# Patient Record
Sex: FEMALE | Race: WHITE | NOT HISPANIC OR LATINO | Employment: FULL TIME | URBAN - METROPOLITAN AREA
[De-identification: names, ages, dates, MRNs, and addresses within clinical notes are randomized per-mention and may not be internally consistent; named-entity substitution may affect disease eponyms.]

---

## 2017-01-20 ENCOUNTER — OFFICE VISIT (OUTPATIENT)
Dept: NEUROSURGERY | Facility: CLINIC | Age: 37
End: 2017-01-20

## 2017-01-20 VITALS — DIASTOLIC BLOOD PRESSURE: 92 MMHG | SYSTOLIC BLOOD PRESSURE: 139 MMHG | HEART RATE: 92 BPM

## 2017-01-20 DIAGNOSIS — G93.5 CHIARI MALFORMATION TYPE I (HCC): Primary | ICD-10-CM

## 2017-01-20 DIAGNOSIS — Z98.890 HISTORY OF BRAIN SURGERY: ICD-10-CM

## 2017-01-20 PROCEDURE — 99024 POSTOP FOLLOW-UP VISIT: CPT | Performed by: NEUROLOGICAL SURGERY

## 2017-01-20 NOTE — PROGRESS NOTES
Subjective   Patient ID: Valentina Anderson is a 36 y.o. female is here today for follow-up. She had a C1-2 laminectomy for decompression of chiari malformation on 10/25/2016.     History of Present Illness    The following portions of the patient's history were reviewed and updated as appropriate: allergies, current medications, past family history, past medical history, past social history, past surgical history and problem list.    Review of Systems   Eyes: Negative for visual disturbance.   Respiratory: Negative for chest tightness and shortness of breath.    Neurological: Negative for dizziness and headaches.   All other systems reviewed and are negative.    It's been 3 months since her posterior fossa decompression and duraplasty for Chiari malformation.  Preoperatively she had headaches, dizziness, numbness and tingling and weakness in the arms and hands, and chronic fatigue.  She says all all of those things have improved.  I'm somewhat surprised about the fatigue but she says that she feels she has much more energy.  She went back to work and is tolerating it.  She had a few questions about her incision which I answered.  I reminded her to work on the chin tuck which help strengthen the muscles in the back of the neck.  We'll see her at the [facility] for a final time which is not months from now.        Objective   Physical Exam   Constitutional: She is oriented to person, place, and time. She appears well-developed and well-nourished.   HENT:   Head: Normocephalic and atraumatic.   Eyes: Conjunctivae and EOM are normal. Pupils are equal, round, and reactive to light.   Fundoscopic exam:       The right eye shows no papilledema. The right eye shows venous pulsations.        The left eye shows no papilledema. The left eye shows venous pulsations.   Neck: Carotid bruit is not present.   Neurological: She is oriented to person, place, and time. She has a normal Finger-Nose-Finger Test and a normal Heel to Shin  Test. Gait normal.   Reflex Scores:       Tricep reflexes are 2+ on the right side and 2+ on the left side.       Bicep reflexes are 2+ on the right side and 2+ on the left side.       Brachioradialis reflexes are 2+ on the right side and 2+ on the left side.       Patellar reflexes are 2+ on the right side and 2+ on the left side.       Achilles reflexes are 2+ on the right side and 2+ on the left side.  Psychiatric: Her speech is normal.     Neurologic Exam     Mental Status   Oriented to person, place, and time.   Registration of memory: Good recent and remote memory.   Attention: normal. Concentration: normal.   Speech: speech is normal   Level of consciousness: alert  Knowledge: consistent with education.     Cranial Nerves     CN II   Visual fields full to confrontation.   Visual acuity: normal    CN III, IV, VI   Pupils are equal, round, and reactive to light.  Extraocular motions are normal.     CN V   Facial sensation intact.   Right corneal reflex: normal  Left corneal reflex: normal    CN VII   Facial expression full, symmetric.   Right facial weakness: none  Left facial weakness: none    CN VIII   Hearing: intact    CN IX, X   Palate: symmetric    CN XI   Right sternocleidomastoid strength: normal  Left sternocleidomastoid strength: normal    CN XII   Tongue: not atrophic  Tongue deviation: none    Motor Exam   Muscle bulk: normal  Right arm tone: normal  Left arm tone: normal  Right leg tone: normal  Left leg tone: normal    Strength   Strength 5/5 except as noted.     Sensory Exam   Light touch normal.     Gait, Coordination, and Reflexes     Gait  Gait: normal    Coordination   Finger to nose coordination: normal  Heel to shin coordination: normal    Reflexes   Right brachioradialis: 2+  Left brachioradialis: 2+  Right biceps: 2+  Left biceps: 2+  Right triceps: 2+  Left triceps: 2+  Right patellar: 2+  Left patellar: 2+  Right achilles: 2+  Left achilles: 2+  Right : 2+  Left :  2+      Assessment/Plan   Independent Review of Radiographic Studies:    Reviewed her original MRI done 9/28/16 which did show the Chiari malformation with impaction of the tonsils and the foramen magnum.      Medical Decision Making:    3 months out from surgery.  Doing quite well with elimination of her headache, dizziness, numbness and tingling, and even fatigue.  I'll see her at the [facility] to make sure that the resolution of the symptoms has persisted.  If he stable that point, we can keep it open-ended.      Valentina was seen today for post-op.    Diagnoses and all orders for this visit:    Chiari malformation type I    History of brain surgery    Return in about 9 months (around 10/20/2017).

## 2017-01-20 NOTE — LETTER
January 20, 2017     Maddison Rico, VINCE  [address]  [address]  [place] [address]    Patient: Valentina Anderson   YOB: 1980   Date of Visit: 1/20/2017       Dear VINCE De Jesus:    Valentina Anderson was in my office today. Below are the relevant portions of my assessment and plan of care.      Independent Review of Radiographic Studies:    Reviewed her original MRI done 9/28/16 which did show the Chiari malformation with impaction of the tonsils and the foramen magnum.      Medical Decision Making:    3 months out from surgery.  Doing quite well with elimination of her headache, dizziness, numbness and tingling, and even fatigue.  I'll see her at the [facility] to make sure that the resolution of the symptoms has persisted.  If he stable that point, we can keep it open-ended.      Valentina was seen today for post-op.    Diagnoses and all orders for this visit:    Chiari malformation type I    History of brain surgery    Return in about 9 months (around 10/20/2017).            If you have questions, please do not hesitate to call me. I look forward to following Valentina along with you.         Sincerely,        Jim Santos MD        CC: No Recipients

## 2017-01-20 NOTE — MR AVS SNAPSHOT
Valentina Anderson   1/20/2017 11:15 AM   Office Visit    Dept Phone:  [phone]   Encounter #:  [id number]    Provider:  Jim Santos MD   Department:  [facility] NEUROSURGERY                Your Full Care Plan              Today's Medication Changes          These changes are accurate as of: 1/20/17  1:43 PM.  If you have any questions, ask your nurse or doctor.               Stop taking medication(s)listed here:     cyclobenzaprine 10 MG tablet   Commonly known as:  FLEXERIL   Stopped by:  Jim Santos MD                      Your Updated Medication List          This list is accurate as of: 1/20/17  1:43 PM.  Always use your most recent med list.                esomeprazole 20 MG capsule   Commonly known as:  nexIUM       QUARTETTE 42-21-21-7 DAYS tablet   Generic drug:  Levonorgest-Eth Est & Eth Est       traMADol 50 MG tablet   Commonly known as:  ULTRAM   Take 1 tablet by mouth Every 12 (Twelve) Hours As Needed for moderate pain (4-6).               You Were Diagnosed With        Codes Comments    Chiari malformation type I    -  Primary ICD-10-CM: G93.5  ICD-9-CM: 348.4     History of brain surgery     ICD-10-CM: Z98.890  ICD-9-CM: V15.29       Instructions     None    Patient Instructions History      Upcoming Appointments     Visit Type Date Time Department    POST-OP 1/20/2017 11:15 AM [facility]    FOLLOW UP 10/16/2017 12:30 PM [facility]      [employer] Signup     Our records indicate that you have an active [employer] account.    You can view your After Visit Summary by going to [employer] and logging in with your [employer] username and password.  If you don't have a [employer] username and password but a parent or guardian has access to your record, the parent or guardian should login with their own [employer] username and password and access your record to view the After Visit Summary.    If you have questions,  you can email [email or web address] or call [phone] to talk to our MyChart staff.  Remember, [employer] is NOT to be used for urgent needs.  For medical emergencies, dial 911.               Other Info from Your Visit           Your Appointments     Oct 16, 2017 12:30 PM EDT   Follow Up with Jim Santos MD   [facility] NEUROSURGERY (--)    [address]  [facility] [address] [phone]           Arrive 15 minutes prior to appointment.              Allergies     Penicillins  Hives, Rash    Sulfa Antibiotics  Hives, Rash      Reason for Visit     Post-op           Vital Signs     Blood Pressure Pulse Smoking Status             139/92 92 Former Smoker         Problems and Diagnoses Noted     Congenital anomaly of brain    History of brain surgery

## 2017-10-16 ENCOUNTER — OFFICE VISIT (OUTPATIENT)
Dept: NEUROSURGERY | Facility: CLINIC | Age: 37
End: 2017-10-16

## 2017-10-16 VITALS
SYSTOLIC BLOOD PRESSURE: 110 MMHG | HEIGHT: 67 IN | BODY MASS INDEX: 45.99 KG/M2 | WEIGHT: 293 LBS | HEART RATE: 88 BPM | DIASTOLIC BLOOD PRESSURE: 68 MMHG

## 2017-10-16 DIAGNOSIS — Z98.890 HISTORY OF BRAIN SURGERY: Primary | ICD-10-CM

## 2017-10-16 DIAGNOSIS — G93.5 CHIARI MALFORMATION TYPE I (HCC): ICD-10-CM

## 2017-10-16 PROCEDURE — 99212 OFFICE O/P EST SF 10 MIN: CPT | Performed by: NEUROLOGICAL SURGERY

## 2017-10-16 NOTE — PROGRESS NOTES
Subjective   Patient ID: Valentina Anderson is a 37 y.o. female is here today for follow-up on chiari malformation. Patient had a C1-2 laminectomy on 10/25/16.    At last visit the patient reported having had headaches, dizziness, numbness, tingling and weak in the arms and hands, along with chronic fatigue.    Today the patient reports that she has been doing well. Her symptoms have improved. She states that the only symptom she is currently dealing with his stiffness in her neck.    History of Present Illness    The following portions of the patient's history were reviewed and updated as appropriate: allergies, current medications, past family history, past medical history, past social history, past surgical history and problem list.    Review of Systems   Musculoskeletal: Positive for neck stiffness.   All other systems reviewed and are negative.    She is about a year out from a posterior fossa decompression with a C1 and C2 laminectomy for Chiari malformation.  It has been 9 months since I saw her.  She is doing well.  The only issue that she has is some neck stiffness which gets better with movement and heat.  She is back in school to get her master's in nursing.  Oddly enough, her fatigue is better although I never expected it would be.  The other symptoms such as headache, dizziness, numbness and tingling never came back.  She is doing well.  I discussed the importance of her exercises and we can keep it open-ended at this point.        Objective   Physical Exam   Constitutional: She is oriented to person, place, and time. She appears well-developed and well-nourished.   HENT:   Head: Normocephalic and atraumatic.   Eyes: Conjunctivae and EOM are normal. Pupils are equal, round, and reactive to light.   Fundoscopic exam:       The right eye shows no papilledema. The right eye shows venous pulsations.        The left eye shows no papilledema. The left eye shows venous pulsations.   Neck: Carotid bruit is not  present.   Neurological: She is oriented to person, place, and time. She has a normal Finger-Nose-Finger Test and a normal Heel to Shin Test. Gait normal.   Reflex Scores:       Tricep reflexes are 2+ on the right side and 2+ on the left side.       Bicep reflexes are 2+ on the right side and 2+ on the left side.       Brachioradialis reflexes are 2+ on the right side and 2+ on the left side.       Patellar reflexes are 2+ on the right side and 2+ on the left side.       Achilles reflexes are 2+ on the right side and 2+ on the left side.  Psychiatric: Her speech is normal.     Neurologic Exam     Mental Status   Oriented to person, place, and time.   Registration of memory: Good recent and remote memory.   Attention: normal. Concentration: normal.   Speech: speech is normal   Level of consciousness: alert  Knowledge: consistent with education.     Cranial Nerves     CN II   Visual fields full to confrontation.   Visual acuity: normal    CN III, IV, VI   Pupils are equal, round, and reactive to light.  Extraocular motions are normal.     CN V   Facial sensation intact.   Right corneal reflex: normal  Left corneal reflex: normal    CN VII   Facial expression full, symmetric.   Right facial weakness: none  Left facial weakness: none    CN VIII   Hearing: intact    CN IX, X   Palate: symmetric    CN XI   Right sternocleidomastoid strength: normal  Left sternocleidomastoid strength: normal    CN XII   Tongue: not atrophic  Tongue deviation: none    Motor Exam   Muscle bulk: normal  Right arm tone: normal  Left arm tone: normal  Right leg tone: normal  Left leg tone: normal    Strength   Strength 5/5 except as noted.     Sensory Exam   Light touch normal.     Gait, Coordination, and Reflexes     Gait  Gait: normal    Coordination   Finger to nose coordination: normal  Heel to shin coordination: normal    Reflexes   Right brachioradialis: 2+  Left brachioradialis: 2+  Right biceps: 2+  Left biceps: 2+  Right triceps:  2+  Left triceps: 2+  Right patellar: 2+  Left patellar: 2+  Right achilles: 2+  Left achilles: 2+  Right : 2+  Left : 2+      Assessment/Plan   Independent Review of Radiographic Studies:      Medical Decision Making:    Doing very well a year out from surgery.  The only thing that bothers her is some neck stiffness which seems to get better with movement and heat.  Will keep it open-ended at this point.  If recurrent symptoms come about she will let me know.  I think that will be unlikely however.         Diagnoses and all orders for this visit:    History of brain surgery    Chiari malformation type I    Return if symptoms worsen or fail to improve.

## 2018-01-11 ENCOUNTER — OFFICE VISIT CONVERTED (OUTPATIENT)
Dept: FAMILY MEDICINE CLINIC | Facility: CLINIC | Age: 38
End: 2018-01-11
Attending: NURSE PRACTITIONER

## 2018-02-22 ENCOUNTER — OFFICE VISIT CONVERTED (OUTPATIENT)
Dept: FAMILY MEDICINE CLINIC | Facility: CLINIC | Age: 38
End: 2018-02-22
Attending: NURSE PRACTITIONER

## 2018-04-05 ENCOUNTER — OFFICE VISIT CONVERTED (OUTPATIENT)
Dept: FAMILY MEDICINE CLINIC | Facility: CLINIC | Age: 38
End: 2018-04-05
Attending: NURSE PRACTITIONER

## 2018-04-05 ENCOUNTER — CONVERSION ENCOUNTER (OUTPATIENT)
Dept: FAMILY MEDICINE CLINIC | Facility: CLINIC | Age: 38
End: 2018-04-05

## 2018-06-01 ENCOUNTER — CONVERSION ENCOUNTER (OUTPATIENT)
Dept: GENERAL RADIOLOGY | Facility: HOSPITAL | Age: 38
End: 2018-06-01

## 2018-07-05 ENCOUNTER — CONVERSION ENCOUNTER (OUTPATIENT)
Dept: FAMILY MEDICINE CLINIC | Facility: CLINIC | Age: 38
End: 2018-07-05

## 2018-07-05 ENCOUNTER — OFFICE VISIT CONVERTED (OUTPATIENT)
Dept: FAMILY MEDICINE CLINIC | Facility: CLINIC | Age: 38
End: 2018-07-05
Attending: NURSE PRACTITIONER

## 2018-10-11 ENCOUNTER — OFFICE VISIT CONVERTED (OUTPATIENT)
Dept: FAMILY MEDICINE CLINIC | Facility: CLINIC | Age: 38
End: 2018-10-11
Attending: NURSE PRACTITIONER

## 2018-10-30 ENCOUNTER — OFFICE VISIT CONVERTED (OUTPATIENT)
Dept: FAMILY MEDICINE CLINIC | Facility: CLINIC | Age: 38
End: 2018-10-30
Attending: NURSE PRACTITIONER

## 2018-11-29 ENCOUNTER — OFFICE VISIT CONVERTED (OUTPATIENT)
Dept: FAMILY MEDICINE CLINIC | Facility: CLINIC | Age: 38
End: 2018-11-29
Attending: NURSE PRACTITIONER

## 2019-01-03 ENCOUNTER — HOSPITAL ENCOUNTER (OUTPATIENT)
Dept: LAB | Facility: HOSPITAL | Age: 39
Discharge: HOME | End: 2019-01-03

## 2019-01-03 LAB
25(OH)D3 SERPL-MCNC: 28.3 NG/ML (ref 30–100)
ALBUMIN SERPL-MCNC: 3.7 G/DL (ref 3.5–5)
ALBUMIN/GLOB SERPL: 1.2 {RATIO} (ref 1.4–2.6)
ALP SERPL-CCNC: 54 U/L (ref 42–98)
ALT SERPL-CCNC: 16 U/L (ref 10–40)
ANION GAP SERPL CALC-SCNC: 16 MMOL/L (ref 8–19)
AST SERPL-CCNC: 16 U/L (ref 15–50)
BASOPHILS # BLD AUTO: 0.07 10*3/UL (ref 0–0.2)
BASOPHILS NFR BLD AUTO: 0.95 % (ref 0–3)
BILIRUB SERPL-MCNC: 0.32 MG/DL (ref 0.2–1.3)
BUN SERPL-MCNC: 8 MG/DL (ref 5–25)
BUN/CREAT SERPL: 9 {RATIO} (ref 6–20)
CALCIUM SERPL-MCNC: 8.7 MG/DL (ref 8.7–10.4)
CHLORIDE SERPL-SCNC: 106 MMOL/L (ref 99–111)
CHOLEST SERPL-MCNC: 137 MG/DL (ref 107–200)
CHOLEST/HDLC SERPL: 3.9 {RATIO} (ref 3–6)
CONV CO2: 22 MMOL/L (ref 22–32)
CONV TOTAL PROTEIN: 6.9 G/DL (ref 6.3–8.2)
CREAT UR-MCNC: 0.85 MG/DL (ref 0.5–0.9)
EOSINOPHIL # BLD AUTO: 0.19 10*3/UL (ref 0–0.7)
EOSINOPHIL # BLD AUTO: 2.39 % (ref 0–7)
ERYTHROCYTE [DISTWIDTH] IN BLOOD BY AUTOMATED COUNT: 13 % (ref 11.5–14.5)
FOLATE SERPL-MCNC: 14.7 NG/ML (ref 4.8–20)
GFR SERPLBLD BASED ON 1.73 SQ M-ARVRAT: >60 ML/MIN/{1.73_M2}
GLOBULIN UR ELPH-MCNC: 3.2 G/DL (ref 2–3.5)
GLUCOSE SERPL-MCNC: 95 MG/DL (ref 65–99)
HBA1C MFR BLD: 13.2 G/DL (ref 12–16)
HCT VFR BLD AUTO: 42.3 % (ref 37–47)
HDLC SERPL-MCNC: 35 MG/DL (ref 40–60)
IRON SATN MFR SERPL: 16 % (ref 20–55)
IRON SERPL-MCNC: 77 UG/DL (ref 60–170)
LDLC SERPL CALC-MCNC: 81 MG/DL (ref 70–100)
LYMPHOCYTES # BLD AUTO: 2.27 10*3/UL (ref 1–5)
MCH RBC QN AUTO: 27.5 PG (ref 27–31)
MCHC RBC AUTO-ENTMCNC: 31.3 G/DL (ref 33–37)
MCV RBC AUTO: 87.8 FL (ref 81–99)
MONOCYTES # BLD AUTO: 0.54 10*3/UL (ref 0.2–1.2)
MONOCYTES NFR BLD AUTO: 6.9 % (ref 3–10)
NEUTROPHILS # BLD AUTO: 4.68 10*3/UL (ref 2–8)
NEUTROPHILS NFR BLD AUTO: 60.4 % (ref 30–85)
NRBC BLD AUTO-RTO: 0 % (ref 0–0.01)
OSMOLALITY SERPL CALC.SUM OF ELEC: 288 MOSM/KG (ref 273–304)
PLATELET # BLD AUTO: 311 10*3/UL (ref 130–400)
PMV BLD AUTO: 7.7 FL (ref 7.4–10.4)
POTASSIUM SERPL-SCNC: 3.7 MMOL/L (ref 3.5–5.3)
RBC # BLD AUTO: 4.82 10*6/UL (ref 4.2–5.4)
SODIUM SERPL-SCNC: 140 MMOL/L (ref 135–147)
TIBC SERPL-MCNC: 490 UG/DL (ref 245–450)
TRANSFERRIN SERPL-MCNC: 343 MG/DL (ref 250–380)
TRIGL SERPL-MCNC: 106 MG/DL (ref 40–150)
TSH SERPL-ACNC: 1.8 M[IU]/L (ref 0.27–4.2)
VARIANT LYMPHS NFR BLD MANUAL: 29.4 % (ref 20–45)
VIT B12 SERPL-MCNC: 186 PG/ML (ref 211–911)
VLDLC SERPL-MCNC: 21 MG/DL (ref 5–37)
WBC # BLD AUTO: 7.75 10*3/UL (ref 4.8–10.8)

## 2019-01-07 ENCOUNTER — OFFICE VISIT CONVERTED (OUTPATIENT)
Dept: FAMILY MEDICINE CLINIC | Facility: CLINIC | Age: 39
End: 2019-01-07
Attending: NURSE PRACTITIONER

## 2019-03-26 ENCOUNTER — HOSPITAL ENCOUNTER (OUTPATIENT)
Dept: LAB | Facility: HOSPITAL | Age: 39
Discharge: HOME | End: 2019-03-26
Attending: NURSE PRACTITIONER

## 2019-03-26 LAB
25(OH)D3 SERPL-MCNC: 40.6 NG/ML (ref 30–100)
BASOPHILS # BLD AUTO: 0.07 10*3/UL (ref 0–0.2)
BASOPHILS NFR BLD AUTO: 1 % (ref 0–3)
CONV ABS IMM GRAN: 0.02 10*3/UL (ref 0–0.2)
CONV IMMATURE GRAN: 0.3 % (ref 0–1.8)
DEPRECATED RDW RBC AUTO: 47.1 FL (ref 36.4–46.3)
EOSINOPHIL # BLD AUTO: 0.2 10*3/UL (ref 0–0.7)
EOSINOPHIL # BLD AUTO: 2.7 % (ref 0–7)
ERYTHROCYTE [DISTWIDTH] IN BLOOD BY AUTOMATED COUNT: 14.5 % (ref 11.7–14.4)
FOLATE SERPL-MCNC: 12.9 NG/ML (ref 4.8–20)
HBA1C MFR BLD: 13.3 G/DL (ref 12–16)
HCT VFR BLD AUTO: 42.2 % (ref 37–47)
IRON SATN MFR SERPL: 13 % (ref 20–55)
IRON SERPL-MCNC: 63 UG/DL (ref 60–170)
LYMPHOCYTES # BLD AUTO: 1.96 10*3/UL (ref 1–5)
MCH RBC QN AUTO: 28.2 PG (ref 27–31)
MCHC RBC AUTO-ENTMCNC: 31.5 G/DL (ref 33–37)
MCV RBC AUTO: 89.4 FL (ref 81–99)
MONOCYTES # BLD AUTO: 0.46 10*3/UL (ref 0.2–1.2)
MONOCYTES NFR BLD AUTO: 6.3 % (ref 3–10)
NEUTROPHILS # BLD AUTO: 4.62 10*3/UL (ref 2–8)
NEUTROPHILS NFR BLD AUTO: 63 % (ref 30–85)
NRBC CBCN: 0 % (ref 0–0.7)
PLATELET # BLD AUTO: 302 10*3/UL (ref 130–400)
PMV BLD AUTO: 10.3 FL (ref 9.4–12.3)
RBC # BLD AUTO: 4.72 10*6/UL (ref 4.2–5.4)
T4 FREE SERPL-MCNC: 1.6 NG/DL (ref 0.9–1.8)
TIBC SERPL-MCNC: 476 UG/DL (ref 245–450)
TRANSFERRIN SERPL-MCNC: 333 MG/DL (ref 250–380)
TSH SERPL-ACNC: 1.98 M[IU]/L (ref 0.27–4.2)
VARIANT LYMPHS NFR BLD MANUAL: 26.7 % (ref 20–45)
VIT B12 SERPL-MCNC: 454 PG/ML (ref 211–911)
WBC # BLD AUTO: 7.33 10*3/UL (ref 4.8–10.8)

## 2019-03-29 ENCOUNTER — OFFICE VISIT CONVERTED (OUTPATIENT)
Dept: OTOLARYNGOLOGY | Facility: CLINIC | Age: 39
End: 2019-03-29
Attending: OTOLARYNGOLOGY

## 2019-07-05 ENCOUNTER — HOSPITAL ENCOUNTER (OUTPATIENT)
Dept: GENERAL RADIOLOGY | Facility: HOSPITAL | Age: 39
Discharge: HOME | End: 2019-07-05
Attending: OBSTETRICS & GYNECOLOGY

## 2019-07-09 ENCOUNTER — HOSPITAL ENCOUNTER (OUTPATIENT)
Dept: GENERAL RADIOLOGY | Facility: HOSPITAL | Age: 39
Discharge: HOME | End: 2019-07-09
Attending: OBSTETRICS & GYNECOLOGY

## 2019-07-15 ENCOUNTER — HOSPITAL ENCOUNTER (OUTPATIENT)
Dept: LAB | Facility: HOSPITAL | Age: 39
Discharge: HOME | End: 2019-07-15
Attending: NURSE PRACTITIONER

## 2019-07-15 LAB
25(OH)D3 SERPL-MCNC: 53.8 NG/ML (ref 30–100)
ALBUMIN SERPL-MCNC: 3.6 G/DL (ref 3.5–5)
ALBUMIN/GLOB SERPL: 1.2 {RATIO} (ref 1.4–2.6)
ALP SERPL-CCNC: 45 U/L (ref 42–98)
ALT SERPL-CCNC: 18 U/L (ref 10–40)
ANION GAP SERPL CALC-SCNC: 18 MMOL/L (ref 8–19)
AST SERPL-CCNC: 14 U/L (ref 15–50)
BASOPHILS # BLD AUTO: 0.04 10*3/UL (ref 0–0.2)
BASOPHILS NFR BLD AUTO: 0.4 % (ref 0–3)
BILIRUB SERPL-MCNC: 0.25 MG/DL (ref 0.2–1.3)
BUN SERPL-MCNC: 11 MG/DL (ref 5–25)
BUN/CREAT SERPL: 13 {RATIO} (ref 6–20)
CALCIUM SERPL-MCNC: 8.8 MG/DL (ref 8.7–10.4)
CHLORIDE SERPL-SCNC: 107 MMOL/L (ref 99–111)
CHOLEST SERPL-MCNC: 146 MG/DL (ref 107–200)
CHOLEST/HDLC SERPL: 3.3 {RATIO} (ref 3–6)
CONV ABS IMM GRAN: 0.05 10*3/UL (ref 0–0.2)
CONV CO2: 21 MMOL/L (ref 22–32)
CONV IMMATURE GRAN: 0.5 % (ref 0–1.8)
CONV TOTAL PROTEIN: 6.7 G/DL (ref 6.3–8.2)
CREAT UR-MCNC: 0.86 MG/DL (ref 0.5–0.9)
DEPRECATED RDW RBC AUTO: 53.6 FL (ref 36.4–46.3)
EOSINOPHIL # BLD AUTO: 0.09 10*3/UL (ref 0–0.7)
EOSINOPHIL # BLD AUTO: 0.9 % (ref 0–7)
ERYTHROCYTE [DISTWIDTH] IN BLOOD BY AUTOMATED COUNT: 15.9 % (ref 11.7–14.4)
GFR SERPLBLD BASED ON 1.73 SQ M-ARVRAT: >60 ML/MIN/{1.73_M2}
GLOBULIN UR ELPH-MCNC: 3.1 G/DL (ref 2–3.5)
GLUCOSE SERPL-MCNC: 92 MG/DL (ref 65–99)
HBA1C MFR BLD: 13.2 G/DL (ref 12–16)
HCT VFR BLD AUTO: 42.2 % (ref 37–47)
HDLC SERPL-MCNC: 44 MG/DL (ref 40–60)
LDLC SERPL CALC-MCNC: 82 MG/DL (ref 70–100)
LYMPHOCYTES # BLD AUTO: 2.95 10*3/UL (ref 1–5)
MCH RBC QN AUTO: 28.8 PG (ref 27–31)
MCHC RBC AUTO-ENTMCNC: 31.3 G/DL (ref 33–37)
MCV RBC AUTO: 92.1 FL (ref 81–99)
MONOCYTES # BLD AUTO: 0.56 10*3/UL (ref 0.2–1.2)
MONOCYTES NFR BLD AUTO: 5.8 % (ref 3–10)
NEUTROPHILS # BLD AUTO: 6 10*3/UL (ref 2–8)
NEUTROPHILS NFR BLD AUTO: 62 % (ref 30–85)
NRBC CBCN: 0 % (ref 0–0.7)
OSMOLALITY SERPL CALC.SUM OF ELEC: 293 MOSM/KG (ref 273–304)
PLATELET # BLD AUTO: 310 10*3/UL (ref 130–400)
PMV BLD AUTO: 10.1 FL (ref 9.4–12.3)
POTASSIUM SERPL-SCNC: 3.8 MMOL/L (ref 3.5–5.3)
RBC # BLD AUTO: 4.58 10*6/UL (ref 4.2–5.4)
SODIUM SERPL-SCNC: 142 MMOL/L (ref 135–147)
T4 FREE SERPL-MCNC: 1.5 NG/DL (ref 0.9–1.8)
TRIGL SERPL-MCNC: 100 MG/DL (ref 40–150)
TSH SERPL-ACNC: 2.09 M[IU]/L (ref 0.27–4.2)
VARIANT LYMPHS NFR BLD MANUAL: 30.4 % (ref 20–45)
VIT B12 SERPL-MCNC: 446 PG/ML (ref 211–911)
VLDLC SERPL-MCNC: 20 MG/DL (ref 5–37)
WBC # BLD AUTO: 9.69 10*3/UL (ref 4.8–10.8)

## 2019-10-18 ENCOUNTER — HOSPITAL ENCOUNTER (OUTPATIENT)
Dept: LAB | Facility: HOSPITAL | Age: 39
Discharge: HOME | End: 2019-10-18
Attending: NURSE PRACTITIONER

## 2019-10-18 LAB
25(OH)D3 SERPL-MCNC: 51.4 NG/ML (ref 30–100)
ALBUMIN SERPL-MCNC: 3.9 G/DL (ref 3.5–5)
ALBUMIN/GLOB SERPL: 1.2 {RATIO} (ref 1.4–2.6)
ALP SERPL-CCNC: 52 U/L (ref 42–98)
ALT SERPL-CCNC: 16 U/L (ref 10–40)
ANION GAP SERPL CALC-SCNC: 16 MMOL/L (ref 8–19)
AST SERPL-CCNC: 16 U/L (ref 15–50)
BASOPHILS # BLD AUTO: 0.06 10*3/UL (ref 0–0.2)
BASOPHILS NFR BLD AUTO: 0.6 % (ref 0–3)
BILIRUB SERPL-MCNC: 0.3 MG/DL (ref 0.2–1.3)
BUN SERPL-MCNC: 11 MG/DL (ref 5–25)
BUN/CREAT SERPL: 12 {RATIO} (ref 6–20)
CALCIUM SERPL-MCNC: 9.3 MG/DL (ref 8.7–10.4)
CHLORIDE SERPL-SCNC: 107 MMOL/L (ref 99–111)
CHOLEST SERPL-MCNC: 146 MG/DL (ref 107–200)
CHOLEST/HDLC SERPL: 3.4 {RATIO} (ref 3–6)
CONV ABS IMM GRAN: 0.04 10*3/UL (ref 0–0.2)
CONV CO2: 22 MMOL/L (ref 22–32)
CONV IMMATURE GRAN: 0.4 % (ref 0–1.8)
CONV TOTAL PROTEIN: 7.1 G/DL (ref 6.3–8.2)
CREAT UR-MCNC: 0.89 MG/DL (ref 0.5–0.9)
DEPRECATED RDW RBC AUTO: 42.8 FL (ref 36.4–46.3)
EOSINOPHIL # BLD AUTO: 0.16 10*3/UL (ref 0–0.7)
EOSINOPHIL # BLD AUTO: 1.7 % (ref 0–7)
ERYTHROCYTE [DISTWIDTH] IN BLOOD BY AUTOMATED COUNT: 12.9 % (ref 11.7–14.4)
GFR SERPLBLD BASED ON 1.73 SQ M-ARVRAT: >60 ML/MIN/{1.73_M2}
GLOBULIN UR ELPH-MCNC: 3.2 G/DL (ref 2–3.5)
GLUCOSE SERPL-MCNC: 89 MG/DL (ref 65–99)
HCT VFR BLD AUTO: 42.3 % (ref 37–47)
HDLC SERPL-MCNC: 43 MG/DL (ref 40–60)
HGB BLD-MCNC: 13.6 G/DL (ref 12–16)
LDLC SERPL CALC-MCNC: 87 MG/DL (ref 70–100)
LYMPHOCYTES # BLD AUTO: 2.61 10*3/UL (ref 1–5)
LYMPHOCYTES NFR BLD AUTO: 27.6 % (ref 20–45)
MCH RBC QN AUTO: 29.5 PG (ref 27–31)
MCHC RBC AUTO-ENTMCNC: 32.2 G/DL (ref 33–37)
MCV RBC AUTO: 91.8 FL (ref 81–99)
MONOCYTES # BLD AUTO: 0.63 10*3/UL (ref 0.2–1.2)
MONOCYTES NFR BLD AUTO: 6.7 % (ref 3–10)
NEUTROPHILS # BLD AUTO: 5.96 10*3/UL (ref 2–8)
NEUTROPHILS NFR BLD AUTO: 63 % (ref 30–85)
NRBC CBCN: 0 % (ref 0–0.7)
OSMOLALITY SERPL CALC.SUM OF ELEC: 291 MOSM/KG (ref 273–304)
PLATELET # BLD AUTO: 318 10*3/UL (ref 130–400)
PMV BLD AUTO: 10 FL (ref 9.4–12.3)
POTASSIUM SERPL-SCNC: 3.7 MMOL/L (ref 3.5–5.3)
RBC # BLD AUTO: 4.61 10*6/UL (ref 4.2–5.4)
SODIUM SERPL-SCNC: 141 MMOL/L (ref 135–147)
T4 FREE SERPL-MCNC: 1.5 NG/DL (ref 0.9–1.8)
TRIGL SERPL-MCNC: 82 MG/DL (ref 40–150)
TSH SERPL-ACNC: 3.09 M[IU]/L (ref 0.27–4.2)
VIT B12 SERPL-MCNC: 706 PG/ML (ref 211–911)
VLDLC SERPL-MCNC: 16 MG/DL (ref 5–37)
WBC # BLD AUTO: 9.46 10*3/UL (ref 4.8–10.8)

## 2019-10-25 ENCOUNTER — HOSPITAL ENCOUNTER (OUTPATIENT)
Dept: MRI IMAGING | Facility: HOSPITAL | Age: 39
Discharge: HOME | End: 2019-10-25

## 2019-10-28 ENCOUNTER — HOSPITAL ENCOUNTER (OUTPATIENT)
Dept: MAMMOGRAPHY | Facility: HOSPITAL | Age: 39
Discharge: HOME | End: 2019-10-28
Attending: NURSE PRACTITIONER

## 2019-11-18 ENCOUNTER — CONVERSION ENCOUNTER (OUTPATIENT)
Dept: NEUROLOGY | Facility: CLINIC | Age: 39
End: 2019-11-18

## 2019-11-18 ENCOUNTER — OFFICE VISIT CONVERTED (OUTPATIENT)
Dept: NEUROLOGY | Facility: CLINIC | Age: 39
End: 2019-11-18
Attending: PSYCHIATRY & NEUROLOGY

## 2020-01-16 ENCOUNTER — HOSPITAL ENCOUNTER (OUTPATIENT)
Dept: OTHER | Facility: HOSPITAL | Age: 40
Discharge: HOME | End: 2020-01-16

## 2020-01-16 LAB
ALBUMIN SERPL-MCNC: 3.8 G/DL (ref 3.5–5)
ALBUMIN/GLOB SERPL: 1.1 {RATIO} (ref 1.4–2.6)
ALP SERPL-CCNC: 51 U/L (ref 42–98)
ALT SERPL-CCNC: 16 U/L (ref 10–40)
ANION GAP SERPL CALC-SCNC: 15 MMOL/L (ref 8–19)
AST SERPL-CCNC: 17 U/L (ref 15–50)
BASOPHILS # BLD AUTO: 0.05 10*3/UL (ref 0–0.2)
BASOPHILS NFR BLD AUTO: 0.7 % (ref 0–3)
BILIRUB SERPL-MCNC: 0.22 MG/DL (ref 0.2–1.3)
BUN SERPL-MCNC: 12 MG/DL (ref 5–25)
BUN/CREAT SERPL: 13 {RATIO} (ref 6–20)
CALCIUM SERPL-MCNC: 8.9 MG/DL (ref 8.7–10.4)
CHLORIDE SERPL-SCNC: 106 MMOL/L (ref 99–111)
CHOLEST SERPL-MCNC: 151 MG/DL (ref 107–200)
CHOLEST/HDLC SERPL: 3.9 {RATIO} (ref 3–6)
CONV ABS IMM GRAN: 0.04 10*3/UL (ref 0–0.2)
CONV CO2: 22 MMOL/L (ref 22–32)
CONV IMMATURE GRAN: 0.6 % (ref 0–1.8)
CONV TOTAL PROTEIN: 7.3 G/DL (ref 6.3–8.2)
CREAT UR-MCNC: 0.9 MG/DL (ref 0.5–0.9)
DEPRECATED RDW RBC AUTO: 43 FL (ref 36.4–46.3)
EOSINOPHIL # BLD AUTO: 0.23 10*3/UL (ref 0–0.7)
EOSINOPHIL # BLD AUTO: 3.2 % (ref 0–7)
ERYTHROCYTE [DISTWIDTH] IN BLOOD BY AUTOMATED COUNT: 13.1 % (ref 11.7–14.4)
GFR SERPLBLD BASED ON 1.73 SQ M-ARVRAT: >60 ML/MIN/{1.73_M2}
GLOBULIN UR ELPH-MCNC: 3.5 G/DL (ref 2–3.5)
GLUCOSE SERPL-MCNC: 98 MG/DL (ref 65–99)
HCT VFR BLD AUTO: 42.5 % (ref 37–47)
HDLC SERPL-MCNC: 39 MG/DL (ref 40–60)
HGB BLD-MCNC: 13.4 G/DL (ref 12–16)
LDLC SERPL CALC-MCNC: 92 MG/DL (ref 70–100)
LYMPHOCYTES # BLD AUTO: 1.93 10*3/UL (ref 1–5)
LYMPHOCYTES NFR BLD AUTO: 26.7 % (ref 20–45)
MCH RBC QN AUTO: 28.2 PG (ref 27–31)
MCHC RBC AUTO-ENTMCNC: 31.5 G/DL (ref 33–37)
MCV RBC AUTO: 89.3 FL (ref 81–99)
MONOCYTES # BLD AUTO: 0.48 10*3/UL (ref 0.2–1.2)
MONOCYTES NFR BLD AUTO: 6.6 % (ref 3–10)
NEUTROPHILS # BLD AUTO: 4.49 10*3/UL (ref 2–8)
NEUTROPHILS NFR BLD AUTO: 62.2 % (ref 30–85)
NRBC CBCN: 0 % (ref 0–0.7)
OSMOLALITY SERPL CALC.SUM OF ELEC: 288 MOSM/KG (ref 273–304)
PLATELET # BLD AUTO: 306 10*3/UL (ref 130–400)
PMV BLD AUTO: 9.8 FL (ref 9.4–12.3)
POTASSIUM SERPL-SCNC: 3.9 MMOL/L (ref 3.5–5.3)
RBC # BLD AUTO: 4.76 10*6/UL (ref 4.2–5.4)
SODIUM SERPL-SCNC: 139 MMOL/L (ref 135–147)
TRIGL SERPL-MCNC: 99 MG/DL (ref 40–150)
TSH SERPL-ACNC: 2.68 M[IU]/L (ref 0.27–4.2)
VLDLC SERPL-MCNC: 20 MG/DL (ref 5–37)
WBC # BLD AUTO: 7.22 10*3/UL (ref 4.8–10.8)

## 2020-01-24 NOTE — PROGRESS NOTES
Subjective   Patient ID: Valentina Anderson is a 39 y.o. female is here today for follow-up to discuss MRI brain and cervical spine results as ordered by ER physician at [facility]    Patient was seen at [facility] 1.23.20 for visual changes and was diagnosed with ocular migraine. She is currently having constant right sided occipital headaches, intermittent dizziness, no double or blurry vision, no problems with her balance and gait.  She has intermittent neck pain and bilateral hand N/T intermittently.  She denies arm pain or weakness.  She denies problems with swallowing.    She does not see a headache neurologist    Patient was last seen 10.16.17 for neck pain related to Chiari.    Patient had surgery 10.25.16  C1-2 laminectomy    It has been about 3-1/2 years since I did a Chiari decompression on this patient. I have not seen her for a while. She went to the emergency room because she was having some visual blurring on the right side as well as pain on the right side of her head. She has also had some numbness and tingling in her arms and her hands. She was concerned about recurrent problems with her Chiari but she had a brain and cervical MRI that did not show any evidence of cerebellar impaction. 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 She has some mild cervical disk disease which is probably linked to the numbness and tingling in the hands. The emergency room physician thought that she might have been having an ocular migraine, which I think is a reasonable diagnosis. They gave her Fioricet which seems to help. She is concerned about this recurring again. I suggested that she see our neurologist here for management of this ocular migraine. I reassured her that everything from the standpoint of her Chiari surgery seems to be looking fine. Will refer her to Neurology and have her come back and see me in a few months. If  the numbness and tingling in the hands continue we might have to attribute that to the cervical problem and start some therapy, but I would rather hold off on that for right now.       Headache    The problem occurs constantly. The pain is located in the right unilateral and occipital region. The quality of the pain is described as aching. The pain is at a severity of 4/10. The pain is moderate. Associated symptoms include dizziness and neck pain.   Dizziness   The problem occurs intermittently. The problem has been unchanged. Associated symptoms include headaches and neck pain.   Neck Pain    The problem occurs intermittently. The pain is present in the midline. The pain is at a severity of 4/10. The pain is moderate. Associated symptoms include headaches.       The following portions of the patient's history were reviewed and updated as appropriate: allergies, current medications, past family history, past medical history, past social history, past surgical history and problem list.    Review of Systems   Musculoskeletal: Positive for neck pain.   Neurological: Positive for dizziness and headaches.   All other systems reviewed and are negative.      Objective   Physical Exam   Constitutional: She is oriented to person, place, and time. She appears well-developed and well-nourished.   HENT:   Head: Normocephalic and atraumatic.   Eyes: Pupils are equal, round, and reactive to light. Conjunctivae and EOM are normal.   Fundoscopic exam:       The right eye shows no papilledema. The right eye shows venous pulsations.        The left eye shows no papilledema. The left eye shows venous pulsations.   Neck: Carotid bruit is not present.   Neurological: She is oriented to person, place, and time. She has a normal Finger-Nose-Finger Test and a normal Heel to Shin Test. Gait normal.   Reflex Scores:       Tricep reflexes are 2+ on the right side and 2+ on the left side.       Bicep reflexes are 2+ on the right side and 2+ on  the left side.       Brachioradialis reflexes are 2+ on the right side and 2+ on the left side.       Patellar reflexes are 2+ on the right side and 2+ on the left side.       Achilles reflexes are 2+ on the right side and 2+ on the left side.  Psychiatric: Her speech is normal.     Neurologic Exam     Mental Status   Oriented to person, place, and time.   Registration of memory: Good recent and remote memory.   Attention: normal. Concentration: normal.   Speech: speech is normal   Level of consciousness: alert  Knowledge: consistent with education.     Cranial Nerves     CN II   Visual fields full to confrontation.   Visual acuity: normal    CN III, IV, VI   Pupils are equal, round, and reactive to light.  Extraocular motions are normal.     CN V   Facial sensation intact.   Right corneal reflex: normal  Left corneal reflex: normal    CN VII   Facial expression full, symmetric.   Right facial weakness: none  Left facial weakness: none    CN VIII   Hearing: intact    CN IX, X   Palate: symmetric    CN XI   Right sternocleidomastoid strength: normal  Left sternocleidomastoid strength: normal    CN XII   Tongue: not atrophic  Tongue deviation: none    Motor Exam   Muscle bulk: normal  Right arm tone: normal  Left arm tone: normal  Right leg tone: normal  Left leg tone: normal    Strength   Strength 5/5 except as noted.     Sensory Exam   Light touch normal.     Gait, Coordination, and Reflexes     Gait  Gait: normal    Coordination   Finger to nose coordination: normal  Heel to shin coordination: normal    Reflexes   Right brachioradialis: 2+  Left brachioradialis: 2+  Right biceps: 2+  Left biceps: 2+  Right triceps: 2+  Left triceps: 2+  Right patellar: 2+  Left patellar: 2+  Right achilles: 2+  Left achilles: 2+  Right : 2+  Left : 2+      Assessment/Plan   Independent Review of Radiographic Studies:    I reviewed the brain MRI and cervical MRI no evidence of cerebellar ectopia.  There is a small disc bulge  at C6-C7 abutting the central cord.  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  There is an incidental clival cyst.  Agree with the report.      Medical Decision Making:    We will send her to our neurologist here for management of her ocular migraine and see her in 3 months.  If the tingling and the numbness in the hands continue will treat her for some cervical disc problem perhaps with physical  therapy but I would like the input of the neurologists first.      Valentina was seen today for headache, dizziness, neck pain and numbness.    Diagnoses and all orders for this visit:    Degeneration of intervertebral disc at C6-C7 level    Ocular migraine  -     Ambulatory Referral to Neurology    History of brain surgery      Return in about 3 months (around 4/29/2020).

## 2020-01-28 ENCOUNTER — HOSPITAL ENCOUNTER (OUTPATIENT)
Dept: GENERAL RADIOLOGY | Facility: HOSPITAL | Age: 40
Discharge: HOME | End: 2020-01-28
Attending: NURSE PRACTITIONER

## 2020-01-29 ENCOUNTER — OFFICE VISIT (OUTPATIENT)
Dept: NEUROSURGERY | Facility: CLINIC | Age: 40
End: 2020-01-29

## 2020-01-29 VITALS
HEIGHT: 67 IN | HEART RATE: 72 BPM | DIASTOLIC BLOOD PRESSURE: 74 MMHG | BODY MASS INDEX: 45.99 KG/M2 | WEIGHT: 293 LBS | SYSTOLIC BLOOD PRESSURE: 130 MMHG

## 2020-01-29 DIAGNOSIS — M50.323 DEGENERATION OF INTERVERTEBRAL DISC AT C6-C7 LEVEL: Primary | ICD-10-CM

## 2020-01-29 DIAGNOSIS — Z98.890 HISTORY OF BRAIN SURGERY: ICD-10-CM

## 2020-01-29 DIAGNOSIS — G43.109 OCULAR MIGRAINE: ICD-10-CM

## 2020-01-29 PROCEDURE — 99214 OFFICE O/P EST MOD 30 MIN: CPT | Performed by: NEUROLOGICAL SURGERY

## 2020-01-29 RX ORDER — BUTALBITAL, ACETAMINOPHEN AND CAFFEINE 50; 325; 40 MG/1; MG/1; MG/1
TABLET ORAL
COMMUNITY
Start: 2020-01-23 | End: 2020-09-16

## 2020-01-29 RX ORDER — HYDROCODONE BITARTRATE AND ACETAMINOPHEN 5; 325 MG/1; MG/1
TABLET ORAL
COMMUNITY
Start: 2020-01-28 | End: 2020-09-16

## 2020-01-29 RX ORDER — LEVOTHYROXINE SODIUM 0.07 MG/1
TABLET ORAL
COMMUNITY
Start: 2020-01-27 | End: 2021-08-12

## 2020-04-21 ENCOUNTER — TELEMEDICINE (OUTPATIENT)
Dept: NEUROLOGY | Facility: CLINIC | Age: 40
End: 2020-04-21

## 2020-04-21 DIAGNOSIS — G43.109 MIGRAINE WITH AURA AND WITHOUT STATUS MIGRAINOSUS, NOT INTRACTABLE: ICD-10-CM

## 2020-04-21 DIAGNOSIS — G93.5 CHIARI MALFORMATION TYPE I (HCC): ICD-10-CM

## 2020-04-21 DIAGNOSIS — M54.81 OCCIPITAL NEURALGIA OF RIGHT SIDE: Primary | ICD-10-CM

## 2020-04-21 PROCEDURE — 99204 OFFICE O/P NEW MOD 45 MIN: CPT | Performed by: PSYCHIATRY & NEUROLOGY

## 2020-04-21 RX ORDER — SUMATRIPTAN 100 MG/1
TABLET, FILM COATED ORAL
Qty: 12 TABLET | Refills: 4 | Status: SHIPPED | OUTPATIENT
Start: 2020-04-21 | End: 2020-09-16

## 2020-04-21 NOTE — PROGRESS NOTES
No chief complaint on file.      Patient ID: Valentina Anderson is a 40 y.o. female.    HPI: I had the pleasure of having a telemedicine video visit with Valentina today.  This was a video telemedicine visit that she did consent to.  She was at home.  I am here at the clinic.  Thank you for referring Valentina to see us in the neurology clinic at [facility] neurology.  As you may know she is a 40-year-old female referred to us by Dr. Santos for history of headaches.  She has a history of Chiari type I malformation and is status post decompressive surgery in 2016.  She states that for the past few years she has noted significant right sided head pain.  The pain typically radiates from the back of the head.  She says that she has 5 to 7 days of pain within 30 days.  It typically radiates from the right occipital head region to the right temporoparietal head region.  She does note that the symptoms tend to be worse with sinus issues.  She denies any focal weakness or numbness with these headaches.  No visual changes.  She does have a history of migraine with aura as well.  She says that she is only had 1 of these recently.  She usually has images that are stacked.  It is also associated with severe throbbing head pain along with sensitivity to light and sound.  She was recently prescribed Fioricet for that for which she says does help.  No recent head injuries.  No family history of migraines.  Recent MRI of the brain performed on 10/25/2019 showed expected postsurgical changes from decompression at the skull base for Chiari malformation without evidence of cerebellar tonsillar ectopia.  Cervical spine MRI of the same day does show evidence for broad-based disc bulge and central protrusion effacing the ventral thecal sac with a small portion of disc abutting the central cord and a small amount of inferior disc extrusion effacing the ventral thecal sac.    The following portions of the patient's history were reviewed  and updated as appropriate: allergies, current medications, past family history, past medical history, past social history, past surgical history and problem list.    Review of Systems   I reviewed and agree with the above review of systems completed by the medical assistant.    There were no vitals filed for this visit.    Neurologic Exam no obvious facial droop.  No slurred speech/dysarthria.  No aphasia.  She was alert and oriented x3.    Physical Exam    Procedures    Assessment/Plan: I feel that her symptoms are likely occipital neuralgia related to degenerative disc disease noted on MRI cervical spine.  I would like to move forward with an occipital nerve block on the right side.  I did also mention using sumatriptan as opposed to Fioricet for her  Migraine onset.  We will see her back here for the occipital nerve block as scheduled.  Total time of visit was 30 minutes discussing signs and symptoms of occipital neuralgia, migraine headache, plan of care and prognosis.       Diagnoses and all orders for this visit:    Occipital neuralgia of right side  -     Nerve Block    Chiari malformation type I (CMS/HCC)    Migraine with aura and without status migrainosus, not intractable  -     SUMAtriptan (IMITREX) 100 MG tablet; Take one tablet at onset of headache. May repeat dose one time in 2 hours if headache not relieved.           Arnulfo Judd II, MD

## 2020-04-28 ENCOUNTER — TELEPHONE (OUTPATIENT)
Dept: NEUROLOGY | Facility: CLINIC | Age: 40
End: 2020-04-28

## 2020-04-28 NOTE — TELEPHONE ENCOUNTER
Phone: [phone] (Home Phone) [phone] (Mobile)     stated that in her Video visit with  that he brought up his staff scheduling a nerve block appt for her, she is wondering when she will be scheduled.

## 2020-05-01 NOTE — PROGRESS NOTES
Subjective   History of Present Illness: Valentina Anderson is a 40 y.o. female for televisit for follow up neurology evaluation for ocular migraines and occipital nerve block with Dr Judd done on 5.7.20 which has helped some with the severity of pain, she has fu appt on 6.8.20 and may have another injection at that time    Patient had telemedicine visit with Dr Arnulfo Judd on 4.21.20 who felt her headaches were related to occipital neuralgia and recommended occipital neuralgia    Patient was last seen 1.29.20 for right sided headaches, dizziness and intermittent neck pain    Patient is currently having right sided headaches, however, they are not as frequent or severe, she has intermittent double/blurry vision with associated headaches, no dizziness, seizure or syncope.  She has intermittent neck pain, but denies arm pain or weakness. No N/T, urinary incontinence or problems with her balance and gait.  Dr Judd advised her about using Fiorcet and is using Imitrex instead    Patient had surgery 10.25.16, C-C2 laminectomy for Chiari    You have chosen to receive care through a telephone visit. Do you consent to use a telephone visit for your medical care today? Yes, at work    This was a TeleVisit from my office; the patient was at work. The visit lasted 7 minutes. She has had a chiari decompression done about 4 years ago. She has been having some headache pain. We had thought it perhaps might be ocular migraines but she saw Dr. Judd who felt it was occipital neuralgia and did an injection a few days ago which seems to be helping. She is going to be seeing him again sometime next month. I am encouraged by this. I reassured her that there did not seem to be any problems with the area of the surgery. Will do a TeleVisit in 4 months and if she is doing well at that point we might be able to release her entirely.       Headache    The problem occurs intermittently. The problem has been gradually improving. The pain is  located in the right unilateral region. The quality of the pain is described as aching. Associated symptoms include blurred vision and neck pain. Pertinent negatives include no numbness or weakness.   Neck Pain    The problem occurs intermittently. The problem has been gradually improving. The pain is present in the midline. The pain is at a severity of 3/10. The pain is mild. Associated symptoms include headaches. Pertinent negatives include no numbness or weakness.       The following portions of the patient's history were reviewed and updated as appropriate: allergies, current medications, past family history, past medical history, past social history, past surgical history and problem list.    Review of Systems   Constitutional: Negative.    Eyes: Positive for blurred vision and visual disturbance.   Respiratory: Negative.    Cardiovascular: Negative.    Gastrointestinal: Negative.    Endocrine: Negative.    Genitourinary: Negative for urgency.   Musculoskeletal: Positive for neck pain. Negative for arthralgias, gait problem, joint swelling, myalgias and neck stiffness.   Allergic/Immunologic: Negative.    Neurological: Positive for headaches. Negative for weakness and numbness.   Hematological: Negative.    Psychiatric/Behavioral: Negative.        Objective             Physical Exam   Deferred  Neurologic Exam   Deferred        Assessment/Plan   Independent Review of Radiographic Studies:      I personally reviewed the images from the following studies.    I reviewed the brain MRI and cervical MRI no evidence of cerebellar ectopia.  There is a small disc bulge at C6-C7 abutting the central cord.  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  There is an incidental clival cyst.  Agree with the report.    Medical Decision Making:      She will continue working with Dr. Judd and get her injections.  Will do a tele-visit in 4 months to see how she is doing.      Valentina was seen today for  headache, neck pain and diplopia.    Diagnoses and all orders for this visit:    Degeneration of intervertebral disc at C6-C7 level    Bilateral occipital neuralgia    History of brain surgery      Return in about 4 months (around 9/13/2020) for As a tele-visit.

## 2020-05-07 ENCOUNTER — PROCEDURE VISIT (OUTPATIENT)
Dept: NEUROLOGY | Facility: CLINIC | Age: 40
End: 2020-05-07

## 2020-05-07 DIAGNOSIS — M54.81 OCCIPITAL NEURALGIA OF RIGHT SIDE: Primary | ICD-10-CM

## 2020-05-07 PROCEDURE — 64405 NJX AA&/STRD GR OCPL NRV: CPT | Performed by: PSYCHIATRY & NEUROLOGY

## 2020-05-07 RX ORDER — LANOLIN ALCOHOL/MO/W.PET/CERES
CREAM (GRAM) TOPICAL
COMMUNITY
Start: 2020-04-22 | End: 2020-09-16

## 2020-05-07 RX ORDER — LIDOCAINE HYDROCHLORIDE 20 MG/ML
0.5 INJECTION, SOLUTION INFILTRATION; PERINEURAL ONCE
Status: COMPLETED | OUTPATIENT
Start: 2020-05-07 | End: 2020-05-07

## 2020-05-07 RX ORDER — HYDROCHLOROTHIAZIDE 25 MG/1
TABLET ORAL
COMMUNITY
Start: 2020-04-22 | End: 2020-09-16

## 2020-05-07 RX ORDER — METHYLPREDNISOLONE ACETATE 40 MG/ML
20 INJECTION, SUSPENSION INTRA-ARTICULAR; INTRALESIONAL; INTRAMUSCULAR; SOFT TISSUE ONCE
Status: COMPLETED | OUTPATIENT
Start: 2020-05-07 | End: 2020-05-07

## 2020-05-07 RX ORDER — CHOLECALCIFEROL (VITAMIN D3) 50 MCG
TABLET ORAL
COMMUNITY
Start: 2020-03-31 | End: 2020-09-16

## 2020-05-07 RX ADMIN — LIDOCAINE HYDROCHLORIDE 0.5 ML: 20 INJECTION, SOLUTION INFILTRATION; PERINEURAL at 09:33

## 2020-05-07 RX ADMIN — METHYLPREDNISOLONE ACETATE 20 MG: 40 INJECTION, SUSPENSION INTRA-ARTICULAR; INTRALESIONAL; INTRAMUSCULAR; SOFT TISSUE at 09:36

## 2020-05-07 NOTE — PROGRESS NOTES
"Procedure   Nerve Block  Date/Time: 5/7/2020 9:47 AM  Performed by: Arnulfo Judd II, MD  Authorized by: Arnulfo Judd II, MD   Consent: Verbal consent obtained. Written consent obtained.  Risks and benefits: risks, benefits and alternatives were discussed  Consent given by: patient  Patient understanding: patient states understanding of the procedure being performed  Patient consent: the patient's understanding of the procedure matches consent given  Procedure consent: procedure consent matches procedure scheduled  Required items: required blood products, implants, devices, and special equipment available  Patient identity confirmed: verbally with patient and provided demographic data  Time out: Immediately prior to procedure a \"time out\" was called to verify the correct patient, procedure, equipment, support staff and site/side marked as required.  Indications comments: occipital neuralgia  Body area: head  Nerve: greater occipital  Laterality: right    Sedation:  Patient sedated: no    Patient position: sitting  Needle size: 27 G  Location technique: anatomical landmarks  Outcome: pain improved  Patient tolerance: Patient tolerated the procedure well with no immediate complications  Comments: 0.5 cc of both Depo-Medrol as well as 2% Xylocaine without epinephrine were drawn into a 5 cc syringe.  The full 1 cc of this mixture was injected at the site of the greater occipital nerve on the right.              Nerve Block   "

## 2020-05-13 ENCOUNTER — OFFICE VISIT (OUTPATIENT)
Dept: NEUROSURGERY | Facility: CLINIC | Age: 40
End: 2020-05-13

## 2020-05-13 DIAGNOSIS — M54.81 BILATERAL OCCIPITAL NEURALGIA: ICD-10-CM

## 2020-05-13 DIAGNOSIS — M50.323 DEGENERATION OF INTERVERTEBRAL DISC AT C6-C7 LEVEL: Primary | ICD-10-CM

## 2020-05-13 DIAGNOSIS — Z98.890 HISTORY OF BRAIN SURGERY: ICD-10-CM

## 2020-05-13 PROCEDURE — 99441 PR PHYS/QHP TELEPHONE EVALUATION 5-10 MIN: CPT | Performed by: NEUROLOGICAL SURGERY

## 2020-05-26 ENCOUNTER — TELEPHONE (OUTPATIENT)
Dept: NEUROLOGY | Facility: CLINIC | Age: 40
End: 2020-05-26

## 2020-05-26 NOTE — TELEPHONE ENCOUNTER
Called and explained to pt that she would need to have her follow up assessment appt before she could get another nerve block. She stated understanding.

## 2020-05-26 NOTE — TELEPHONE ENCOUNTER
Provider: DR. ISLAS  Caller: JUAN JOSÉ YEHDLE   Relationship to Patient: SELF          Reason for Call: PT IS WANTING TO KNOW IF SHE COULD HAVE ANOTHER OCCIPITAL NERVE BLOCK BEFORE HER APPT ON 6- DUE TO SHE HAS PERSIST DAILY HEADACHE AND SHE HAS HAD A MIGRAINE SINCE LAST WEEK.  [phone]

## 2020-06-02 ENCOUNTER — OFFICE VISIT (OUTPATIENT)
Dept: NEUROLOGY | Facility: CLINIC | Age: 40
End: 2020-06-02

## 2020-06-02 VITALS — BODY MASS INDEX: 45.99 KG/M2 | WEIGHT: 293 LBS | OXYGEN SATURATION: 98 % | HEART RATE: 82 BPM | HEIGHT: 67 IN

## 2020-06-02 DIAGNOSIS — M54.81 OCCIPITAL NEURALGIA OF RIGHT SIDE: Primary | ICD-10-CM

## 2020-06-02 PROCEDURE — 99213 OFFICE O/P EST LOW 20 MIN: CPT | Performed by: PSYCHIATRY & NEUROLOGY

## 2020-06-02 NOTE — PROGRESS NOTES
Chief Complaint   Patient presents with   • Occipital Neuraliga   • 1 month f/u       Patient ID: Valentina Anderson is a 40 y.o. female.    HPI: I had the pleasure of seeing Valentina again today in the neurology clinic.  As you may know she is a 40-year-old female with a history of occipital neuralgia, right.  We did perform an occipital nerve block on the lesser occipital nerve for her.  She says that she did experience significant improvement of pain symptoms in that area however she still has ongoing headache and pain symptoms coming from the greater occipital nerve region.  She says that within the last 30 days she has had 30 days of headache.  No focal weakness or numbness.  No vision changes.    The following portions of the patient's history were reviewed and updated as appropriate: allergies, current medications, past family history, past medical history, past social history, past surgical history and problem list.    Review of Systems   Constitutional: Negative for activity change, appetite change and fatigue.   HENT: Negative for facial swelling, hearing loss and trouble swallowing.    Eyes: Negative for photophobia, redness and visual disturbance.   Respiratory: Negative for chest tightness, shortness of breath and wheezing.    Cardiovascular: Negative for chest pain, palpitations and leg swelling.   Gastrointestinal: Negative for abdominal pain, nausea and vomiting.   Endocrine: Negative for cold intolerance, heat intolerance and polydipsia.   Musculoskeletal: Positive for neck pain. Negative for back pain and gait problem.   Skin: Negative for color change, rash and wound.   Allergic/Immunologic: Negative for environmental allergies, food allergies and immunocompromised state.   Neurological: Positive for headaches (pain relief on on side of head but not the other after nerve block ). Negative for dizziness, tremors, seizures, syncope, facial asymmetry, speech difficulty, weakness, light-headedness and  numbness.   Hematological: Negative for adenopathy. Does not bruise/bleed easily.   Psychiatric/Behavioral: Negative for agitation, behavioral problems, confusion, decreased concentration, dysphoric mood, hallucinations, self-injury, sleep disturbance and suicidal ideas. The patient is not nervous/anxious and is not hyperactive.       I have reviewed the review of systems above performed by my medical assistant.      Vitals:    20 1342   Pulse: 82   SpO2: 98%       Neurologic Exam     Mental Status   Oriented to person, place, and time.   Concentration: normal.   Level of consciousness: alert  Knowledge: consistent with education (No deficits found.).     Cranial Nerves     CN II   Visual fields full to confrontation.     CN III, IV, VI   Pupils are equal, round, and reactive to light.  Extraocular motions are normal.   CN III: no CN III palsy  CN VI: no CN VI palsy    CN V   Facial sensation intact.     CN VII   Facial expression full, symmetric.     CN VIII   CN VIII normal.     CN IX, X   CN IX normal.   CN X normal.     CN XI   CN XI normal.     CN XII   CN XII normal.     Motor Exam     Strength   Right neck flexion: 5/5  Left neck flexion: 5/5  Right neck extension: 5/5  Left neck extension: 5/5  Right deltoid: 5/5  Left deltoid: 5/5  Right biceps: 5/5  Left biceps: 5/5  Right triceps: 5/5  Left triceps: 5/5  Right wrist flexion: 5/5  Left wrist flexion: 5/5  Right wrist extension: 5/5  Left wrist extension: 5/5  Right interossei: 5/5  Left interossei: 5/5  Right abdominals: 5/5  Left abdominals: 5/5  Right iliopsoas: 5/5  Left iliopsoas: 5/5  Right quadriceps: 5/5  Left quadriceps: 5/5  Right hamstrin/5  Left hamstrin/5  Right glutei: 5/5  Left glutei: 5/5  Right anterior tibial: 5/5  Left anterior tibial: 5/5  Right posterior tibial: 5/5  Left posterior tibial: 5/5  Right peroneal: 5/5  Left peroneal: 5/5  Right gastroc: 5/5  Left gastroc: 5/5    Sensory Exam   Light touch normal.   Vibration  normal.     Gait, Coordination, and Reflexes     Gait  Gait: normal    Reflexes   Right brachioradialis: 2+  Left brachioradialis: 2+  Right biceps: 2+  Left biceps: 2+  Right triceps: 2+  Left triceps: 2+  Right patellar: 2+  Left patellar: 2+  Right achilles: 2+  Left achilles: 2+  Right : 2+  Left : 2+Station is normal.       Physical Exam   Constitutional: She is oriented to person, place, and time. She appears well-developed and well-nourished.   HENT:   Head: Normocephalic and atraumatic.   Eyes: Pupils are equal, round, and reactive to light. EOM are normal.   Cardiovascular: Normal rate and regular rhythm.   Pulmonary/Chest: Breath sounds normal.   Musculoskeletal: Normal range of motion.   Neurological: She is oriented to person, place, and time. Gait normal.   Reflex Scores:       Tricep reflexes are 2+ on the right side and 2+ on the left side.       Bicep reflexes are 2+ on the right side and 2+ on the left side.       Brachioradialis reflexes are 2+ on the right side and 2+ on the left side.       Patellar reflexes are 2+ on the right side and 2+ on the left side.       Achilles reflexes are 2+ on the right side and 2+ on the left side.  Skin: Skin is warm.   Vitals reviewed.      Procedures    Assessment/Plan: We will schedule her for a greater occipital nerve block.  Hopefully this will obliterate most of the headache cycle for her.       Valentina was seen today for occipital neuraliga and 1 month f/u.    Diagnoses and all orders for this visit:    Occipital neuralgia of right side  -     Nerve Block           Arnulfo Judd II, MD

## 2020-06-04 ENCOUNTER — PROCEDURE VISIT (OUTPATIENT)
Dept: NEUROLOGY | Facility: CLINIC | Age: 40
End: 2020-06-04

## 2020-06-04 DIAGNOSIS — M54.81 OCCIPITAL NEURALGIA OF RIGHT SIDE: Primary | ICD-10-CM

## 2020-06-04 PROCEDURE — 64405 NJX AA&/STRD GR OCPL NRV: CPT | Performed by: PSYCHIATRY & NEUROLOGY

## 2020-06-04 RX ORDER — METHYLPREDNISOLONE ACETATE 40 MG/ML
20 INJECTION, SUSPENSION INTRA-ARTICULAR; INTRALESIONAL; INTRAMUSCULAR; SOFT TISSUE ONCE
Status: COMPLETED | OUTPATIENT
Start: 2020-06-04 | End: 2020-06-04

## 2020-06-04 RX ORDER — LIDOCAINE HYDROCHLORIDE 20 MG/ML
0.5 INJECTION, SOLUTION INFILTRATION; PERINEURAL ONCE
Status: COMPLETED | OUTPATIENT
Start: 2020-06-04 | End: 2020-06-04

## 2020-06-04 RX ADMIN — LIDOCAINE HYDROCHLORIDE 0.5 ML: 20 INJECTION, SOLUTION INFILTRATION; PERINEURAL at 08:49

## 2020-06-04 RX ADMIN — METHYLPREDNISOLONE ACETATE 20 MG: 40 INJECTION, SUSPENSION INTRA-ARTICULAR; INTRALESIONAL; INTRAMUSCULAR; SOFT TISSUE at 08:49

## 2020-06-04 NOTE — PROGRESS NOTES
"Procedure   Nerve Block  Date/Time: 6/4/2020 9:14 AM  Performed by: Arnulfo Judd II, MD  Authorized by: Arnulfo Judd II, MD   Consent: Verbal consent obtained. Written consent obtained.  Risks and benefits: risks, benefits and alternatives were discussed  Consent given by: patient  Patient understanding: patient states understanding of the procedure being performed  Patient consent: the patient's understanding of the procedure matches consent given  Procedure consent: procedure consent matches procedure scheduled  Relevant documents: relevant documents present and verified  Required items: required blood products, implants, devices, and special equipment available  Patient identity confirmed: verbally with patient and provided demographic data  Time out: Immediately prior to procedure a \"time out\" was called to verify the correct patient, procedure, equipment, support staff and site/side marked as required.  Indications comments: occipital neuralgia  Body area: head  Nerve: greater occipital  Laterality: right    Sedation:  Patient sedated: no    Patient position: sitting  Needle size: 27 G  Location technique: anatomical landmarks  Patient tolerance: Patient tolerated the procedure well with no immediate complications  Comments: 0.5 mg of both Depo-Medrol and 2% Xylocaine without epinephrine were drawn into a 5 cc syringe.  The full 1 cc of this solution is injected at the site of the greater occipital nerve on the right.              Nerve Block   "

## 2020-07-06 ENCOUNTER — HOSPITAL ENCOUNTER (OUTPATIENT)
Dept: GENERAL RADIOLOGY | Facility: HOSPITAL | Age: 40
Discharge: HOME | End: 2020-07-06
Attending: NURSE PRACTITIONER

## 2020-07-09 ENCOUNTER — OFFICE VISIT (OUTPATIENT)
Dept: NEUROLOGY | Facility: CLINIC | Age: 40
End: 2020-07-09

## 2020-07-09 VITALS — HEIGHT: 67 IN | BODY MASS INDEX: 50.42 KG/M2 | HEART RATE: 98 BPM | OXYGEN SATURATION: 98 %

## 2020-07-09 DIAGNOSIS — M54.2 CHRONIC NECK PAIN: ICD-10-CM

## 2020-07-09 DIAGNOSIS — G89.29 CHRONIC NECK PAIN: ICD-10-CM

## 2020-07-09 DIAGNOSIS — G43.109 MIGRAINE WITH AURA AND WITHOUT STATUS MIGRAINOSUS, NOT INTRACTABLE: ICD-10-CM

## 2020-07-09 DIAGNOSIS — M54.81 OCCIPITAL NEURALGIA OF RIGHT SIDE: Primary | ICD-10-CM

## 2020-07-09 PROCEDURE — 99213 OFFICE O/P EST LOW 20 MIN: CPT | Performed by: PSYCHIATRY & NEUROLOGY

## 2020-07-09 NOTE — PROGRESS NOTES
Chief Complaint   Patient presents with   • Occipital Neuralgia   • 1 month nerve block f/u       Patient ID: Valentina Anderson is a 40 y.o. female.    HPI: I had the pleasure of seeing your patient in today.  As you know she is a 40-year-old female with history of occipital neuralgia.  She has been doing well since last follow-up.  We did perform an occipital nerve block.  She says that that has helped.  She has had 0 days of headaches within the last 30 days however she has noted some neck tightness on the right specifically.  This has not led to any significant pain however she does feel the tightness there which has been a chronic issue.  She denies any new symptoms neurologically.  No focal weakness or numbness.  She does have a prescription for sumatriptan as abortive treatment for migrainous type headaches however has not had to take that as of late.    The following portions of the patient's history were reviewed and updated as appropriate: allergies, current medications, past family history, past medical history, past social history, past surgical history and problem list.    Review of Systems   Constitutional: Negative for activity change, appetite change and fatigue.   HENT: Negative for facial swelling, hearing loss and trouble swallowing.    Eyes: Negative for photophobia, redness and visual disturbance.   Respiratory: Negative for chest tightness, shortness of breath and wheezing.    Cardiovascular: Negative for chest pain, palpitations and leg swelling.   Gastrointestinal: Negative for abdominal pain, nausea and vomiting.   Endocrine: Negative for cold intolerance, heat intolerance and polydipsia.   Musculoskeletal: Negative for back pain, gait problem and neck pain.   Skin: Negative for color change, rash and wound.   Allergic/Immunologic: Negative for environmental allergies, food allergies and immunocompromised state.   Neurological: Negative for dizziness, tremors, seizures, syncope, facial  asymmetry, speech difficulty, weakness, light-headedness, numbness and headaches.   Hematological: Negative for adenopathy. Does not bruise/bleed easily.   Psychiatric/Behavioral: Negative for agitation, behavioral problems, confusion, decreased concentration, dysphoric mood, hallucinations, self-injury, sleep disturbance and suicidal ideas. The patient is not nervous/anxious and is not hyperactive.       I have reviewed the review of systems above performed by my medical assistant.      Vitals:    20 1223   Pulse: 98   SpO2: 98%       Neurologic Exam     Mental Status   Oriented to person, place, and time.   Concentration: normal.   Level of consciousness: alert  Knowledge: consistent with education (No deficits found.).     Cranial Nerves     CN II   Visual fields full to confrontation.     CN III, IV, VI   Pupils are equal, round, and reactive to light.  Extraocular motions are normal.   CN III: no CN III palsy  CN VI: no CN VI palsy    CN V   Facial sensation intact.     CN VII   Facial expression full, symmetric.     CN VIII   CN VIII normal.     CN IX, X   CN IX normal.   CN X normal.     CN XI   CN XI normal.     CN XII   CN XII normal.     Motor Exam     Strength   Right neck flexion: 5/5  Left neck flexion: 5/5  Right neck extension: 5/5  Left neck extension: 5/5  Right deltoid: 5/5  Left deltoid: 5/5  Right biceps: 5/5  Left biceps: 5/5  Right triceps: 5/5  Left triceps: 5/5  Right wrist flexion: 5/5  Left wrist flexion: 5/5  Right wrist extension: 5/5  Left wrist extension: 5/5  Right interossei: 5/5  Left interossei: 5/5  Right abdominals: 5/5  Left abdominals: 5/5  Right iliopsoas: 5/5  Left iliopsoas: 5/5  Right quadriceps: 5/5  Left quadriceps: 5/5  Right hamstrin/5  Left hamstrin/5  Right glutei: 5/5  Left glutei: 5/5  Right anterior tibial: 5/5  Left anterior tibial: 5/5  Right posterior tibial: 5/5  Left posterior tibial: 5/5  Right peroneal: 5/5  Left peroneal: 5/5  Right gastroc:  5/5  Left gastroc: 5/5    Sensory Exam   Light touch normal.   Vibration normal.     Gait, Coordination, and Reflexes     Gait  Gait: normal    Reflexes   Right brachioradialis: 2+  Left brachioradialis: 2+  Right biceps: 2+  Left biceps: 2+  Right triceps: 2+  Left triceps: 2+  Right patellar: 2+  Left patellar: 2+  Right achilles: 2+  Left achilles: 2+  Right : 2+  Left : 2+Station is normal.       Physical Exam   Constitutional: She is oriented to person, place, and time. She appears well-developed and well-nourished.   HENT:   Head: Normocephalic and atraumatic.   Eyes: Pupils are equal, round, and reactive to light. EOM are normal.   Cardiovascular: Normal rate and regular rhythm.   Pulmonary/Chest: Breath sounds normal.   Musculoskeletal: Normal range of motion.   Neurological: She is oriented to person, place, and time. Gait normal.   Reflex Scores:       Tricep reflexes are 2+ on the right side and 2+ on the left side.       Bicep reflexes are 2+ on the right side and 2+ on the left side.       Brachioradialis reflexes are 2+ on the right side and 2+ on the left side.       Patellar reflexes are 2+ on the right side and 2+ on the left side.       Achilles reflexes are 2+ on the right side and 2+ on the left side.  Skin: Skin is warm.   Vitals reviewed.      Procedures    Assessment/Plan: We are going to give her a prescription for physical therapy for the neck tightness.  Otherwise we will see her back in approximately 3 months or sooner if needed.       Valentina was seen today for occipital neuralgia and 1 month nerve block f/u.    Diagnoses and all orders for this visit:    Occipital neuralgia of right side    Migraine with aura and without status migrainosus, not intractable    Chronic neck pain  -     Ambulatory Referral to Physical Therapy Evaluate and treat           Arnulfo Judd II, MD

## 2020-07-29 ENCOUNTER — HOSPITAL ENCOUNTER (OUTPATIENT)
Dept: LAB | Facility: HOSPITAL | Age: 40
Discharge: HOME | End: 2020-07-29
Attending: NURSE PRACTITIONER

## 2020-07-29 LAB
25(OH)D3 SERPL-MCNC: 44.9 NG/ML (ref 30–100)
ALBUMIN SERPL-MCNC: 3.8 G/DL (ref 3.5–5)
ALBUMIN/GLOB SERPL: 1.1 {RATIO} (ref 1.4–2.6)
ALP SERPL-CCNC: 55 U/L (ref 42–98)
ALT SERPL-CCNC: 26 U/L (ref 10–40)
ANION GAP SERPL CALC-SCNC: 16 MMOL/L (ref 8–19)
AST SERPL-CCNC: 22 U/L (ref 15–50)
BASOPHILS # BLD AUTO: 0.05 10*3/UL (ref 0–0.2)
BASOPHILS NFR BLD AUTO: 0.7 % (ref 0–3)
BILIRUB SERPL-MCNC: 0.32 MG/DL (ref 0.2–1.3)
BUN SERPL-MCNC: 9 MG/DL (ref 5–25)
BUN/CREAT SERPL: 9 {RATIO} (ref 6–20)
CALCIUM SERPL-MCNC: 9.5 MG/DL (ref 8.7–10.4)
CHLORIDE SERPL-SCNC: 105 MMOL/L (ref 99–111)
CHOLEST SERPL-MCNC: 150 MG/DL (ref 107–200)
CHOLEST/HDLC SERPL: 4.4 {RATIO} (ref 3–6)
CONV ABS IMM GRAN: 0.02 10*3/UL (ref 0–0.2)
CONV CO2: 22 MMOL/L (ref 22–32)
CONV IMMATURE GRAN: 0.3 % (ref 0–1.8)
CONV TOTAL PROTEIN: 7.2 G/DL (ref 6.3–8.2)
CREAT UR-MCNC: 1.04 MG/DL (ref 0.5–0.9)
DEPRECATED RDW RBC AUTO: 46.2 FL (ref 36.4–46.3)
EOSINOPHIL # BLD AUTO: 0.18 10*3/UL (ref 0–0.7)
EOSINOPHIL # BLD AUTO: 2.3 % (ref 0–7)
ERYTHROCYTE [DISTWIDTH] IN BLOOD BY AUTOMATED COUNT: 14 % (ref 11.7–14.4)
GFR SERPLBLD BASED ON 1.73 SQ M-ARVRAT: >60 ML/MIN/{1.73_M2}
GLOBULIN UR ELPH-MCNC: 3.4 G/DL (ref 2–3.5)
GLUCOSE SERPL-MCNC: 96 MG/DL (ref 65–99)
HCT VFR BLD AUTO: 43 % (ref 37–47)
HDLC SERPL-MCNC: 34 MG/DL (ref 40–60)
HGB BLD-MCNC: 13.3 G/DL (ref 12–16)
LDLC SERPL CALC-MCNC: 94 MG/DL (ref 70–100)
LYMPHOCYTES # BLD AUTO: 2.26 10*3/UL (ref 1–5)
LYMPHOCYTES NFR BLD AUTO: 29.4 % (ref 20–45)
MCH RBC QN AUTO: 27.7 PG (ref 27–31)
MCHC RBC AUTO-ENTMCNC: 30.9 G/DL (ref 33–37)
MCV RBC AUTO: 89.4 FL (ref 81–99)
MONOCYTES # BLD AUTO: 0.64 10*3/UL (ref 0.2–1.2)
MONOCYTES NFR BLD AUTO: 8.3 % (ref 3–10)
NEUTROPHILS # BLD AUTO: 4.54 10*3/UL (ref 2–8)
NEUTROPHILS NFR BLD AUTO: 59 % (ref 30–85)
NRBC CBCN: 0 % (ref 0–0.7)
OSMOLALITY SERPL CALC.SUM OF ELEC: 287 MOSM/KG (ref 273–304)
PLATELET # BLD AUTO: 322 10*3/UL (ref 130–400)
PMV BLD AUTO: 9.9 FL (ref 9.4–12.3)
POTASSIUM SERPL-SCNC: 3.7 MMOL/L (ref 3.5–5.3)
RBC # BLD AUTO: 4.81 10*6/UL (ref 4.2–5.4)
SODIUM SERPL-SCNC: 139 MMOL/L (ref 135–147)
T4 FREE SERPL-MCNC: 1.3 NG/DL (ref 0.9–1.8)
TRIGL SERPL-MCNC: 111 MG/DL (ref 40–150)
TSH SERPL-ACNC: 4.03 M[IU]/L (ref 0.27–4.2)
VIT B12 SERPL-MCNC: 322 PG/ML (ref 211–911)
VLDLC SERPL-MCNC: 22 MG/DL (ref 5–37)
WBC # BLD AUTO: 7.69 10*3/UL (ref 4.8–10.8)

## 2020-08-05 ENCOUNTER — HOSPITAL ENCOUNTER (OUTPATIENT)
Dept: GENERAL RADIOLOGY | Facility: HOSPITAL | Age: 40
Discharge: HOME | End: 2020-08-05
Attending: NURSE PRACTITIONER

## 2020-08-05 ENCOUNTER — HOSPITAL ENCOUNTER (OUTPATIENT)
Dept: OTHER | Facility: HOSPITAL | Age: 40
Setting detail: RECURRING SERIES
Discharge: HOME | End: 2020-10-05
Attending: PSYCHIATRY & NEUROLOGY

## 2020-08-14 ENCOUNTER — HOSPITAL ENCOUNTER (OUTPATIENT)
Dept: CARDIOLOGY | Facility: HOSPITAL | Age: 40
Discharge: HOME | End: 2020-08-14
Attending: NURSE PRACTITIONER

## 2020-08-31 PROBLEM — M25.50 MULTIPLE JOINT PAIN: Status: ACTIVE | Noted: 2020-08-31

## 2020-08-31 PROBLEM — F41.9 ANXIETY AND DEPRESSION: Status: ACTIVE | Noted: 2020-08-31

## 2020-08-31 PROBLEM — E28.2 PCOS (POLYCYSTIC OVARIAN SYNDROME): Status: ACTIVE | Noted: 2020-08-31

## 2020-08-31 PROBLEM — E03.9 HYPOTHYROID: Status: ACTIVE | Noted: 2020-08-31

## 2020-08-31 PROBLEM — F32.A ANXIETY AND DEPRESSION: Status: ACTIVE | Noted: 2020-08-31

## 2020-08-31 PROBLEM — R53.82 CHRONIC FATIGUE: Status: ACTIVE | Noted: 2020-08-31

## 2020-09-10 NOTE — PROGRESS NOTES
Subjective   History of Present Illness: Valentina Anderson is a 40 y.o. female for televisit follow up.  She is seeing Dr Arnulfo Judd for occipital nerve blocks x 2 which has resolved her headaches.  She is going to PT as ordered by Dr Judd     Patient had surgery 10.25.16, C-C2 laminectomy for Chiari    Patient was last seen 5.13.20 for right sided headaches and intermittent double/blurry vision    Patient states that she is not having any headaches, double or blurry vision, no dizziness, seizure or syncope    You have chosen to receive care through a telephone visit. Do you consent to use a telephone visit for your medical care today? Yes    This was a televisit from my office. The patient was at home. It lasted 6 minutes. She had occipital nerve blocks by Dr. Judd and they got rid of her headaches completely. She is feeling quite well. No symptoms as far as the cervicomedullary junction goes. She is 4 years out from surgery. We can keep it open-ended. If symptoms recur, she will let us know.       History of Present Illness    The following portions of the patient's history were reviewed and updated as appropriate: allergies, current medications, past family history, past medical history, past social history, past surgical history and problem list.    Review of Systems   Constitutional: Negative.    HENT: Negative.    Eyes: Negative for visual disturbance.   Respiratory: Negative.    Cardiovascular: Negative.    Gastrointestinal: Negative.    Endocrine: Negative.    Genitourinary: Negative.    Musculoskeletal: Negative.    Allergic/Immunologic: Negative.    Neurological: Negative for dizziness, tremors, seizures, syncope, facial asymmetry, speech difficulty, weakness, light-headedness, numbness and headaches.   Hematological: Negative.    Psychiatric/Behavioral: Negative for behavioral problems and confusion.           Objective             Physical Exam   Deferred  Neurologic Exam    Deferred        Assessment/Plan   Independent Review of Radiographic Studies:      I personally reviewed the images from the following studies.    I reviewed the brain MRI and cervical MRI no evidence of cerebellar ectopia.  There is a small disc bulge at C6-C7 abutting the central cord.  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  There is an incidental clival cyst.  Agree with the report.    Medical Decision Making:      At this point, we can keep it open-ended.  If symptoms recur, she will let us know.    Valentina was seen today for follow-up.    Diagnoses and all orders for this visit:    Degeneration of intervertebral disc at C6-C7 level    Bilateral occipital neuralgia    History of brain surgery      Return if symptoms worsen or fail to improve.

## 2020-09-16 ENCOUNTER — CONSULT (OUTPATIENT)
Dept: BARIATRICS/WEIGHT MGMT | Facility: CLINIC | Age: 40
End: 2020-09-16

## 2020-09-16 VITALS
BODY MASS INDEX: 45.99 KG/M2 | RESPIRATION RATE: 18 BRPM | DIASTOLIC BLOOD PRESSURE: 87 MMHG | HEART RATE: 92 BPM | WEIGHT: 293 LBS | TEMPERATURE: 97.3 F | SYSTOLIC BLOOD PRESSURE: 143 MMHG | HEIGHT: 67 IN

## 2020-09-16 DIAGNOSIS — K21.9 CHRONIC GERD: ICD-10-CM

## 2020-09-16 DIAGNOSIS — E66.01 OBESITY, CLASS III, BMI 40-49.9 (MORBID OBESITY) (HCC): Primary | ICD-10-CM

## 2020-09-16 DIAGNOSIS — M25.50 MULTIPLE JOINT PAIN: ICD-10-CM

## 2020-09-16 DIAGNOSIS — F41.9 ANXIETY AND DEPRESSION: ICD-10-CM

## 2020-09-16 DIAGNOSIS — F32.A ANXIETY AND DEPRESSION: ICD-10-CM

## 2020-09-16 DIAGNOSIS — E28.2 PCOS (POLYCYSTIC OVARIAN SYNDROME): ICD-10-CM

## 2020-09-16 DIAGNOSIS — N80.9 ENDOMETRIOSIS: ICD-10-CM

## 2020-09-16 PROBLEM — E66.813 OBESITY, CLASS III, BMI 40-49.9 (MORBID OBESITY): Status: ACTIVE | Noted: 2020-09-16

## 2020-09-16 PROCEDURE — 99205 OFFICE O/P NEW HI 60 MIN: CPT | Performed by: NURSE PRACTITIONER

## 2020-09-16 RX ORDER — LEVONORGESTREL AND ETHINYL ESTRADIOL AND ETHINYL ESTRADIOL 0.15MG(84)
1 KIT ORAL DAILY
COMMUNITY
End: 2020-10-22

## 2020-09-16 RX ORDER — METOPROLOL SUCCINATE 25 MG/1
1 TABLET, EXTENDED RELEASE ORAL DAILY
COMMUNITY
Start: 2020-09-03 | End: 2021-11-16

## 2020-09-16 NOTE — PROGRESS NOTES
[facility]IATRIC SURGERY  [address] [address]  [phone]  [address] [address]  [phone]  Dept: [phone]  9/16/2020      Valentina Anderson.  [id number]  [id number]  1980  female      Chief Complaint of weight gain; unable to maintain weight loss    History of Present Illness:   Valentina is a 40 y.o. female who presents today for evaluation, education and consultation regarding weight loss surgery. The patient is interested in the sleeve gastrectomy.      Diet History:Valentina has been overweight for at least 20 years, has been 35 pounds or more overweight for at least 20 years, has been 100 pounds or more overweight for 10 or more years and started dieting at age 20.  The most weight Valentina lost was 30 pounds on medication and maintained the weight loss for 9 months. Valentina describes her eating habits as drinking coffee and soda, overeating at mealtimes, eating to cope with boredom. Valentina Anderson has tried Weight Watchers, Physician monitored, Dietician monitored, reduced calorie and prescription medications among others with success of losing up to 30 pounds, but in each instance regained the weight.  See dietician documentation for complete history.    Bariatric Surgery Evaluation: The patient is being seen for an initial visit for bariatric surgery evaluation.     Bariatric Co-morbidities:  back pain, knee pain, GERD, polycystic ovarian disease, menstrual irregularities and depression    Patient Active Problem List   Diagnosis   • Chiari malformation type I (CMS/HCC)   • Dizziness   • New daily persistent headache   • Surgery follow-up examination   • History of brain surgery   • Degeneration of intervertebral disc at C6-C7 level   • Bilateral occipital neuralgia   • Chronic fatigue   • Multiple joint pain   • Anxiety and depression   • Hypothyroid   • PCOS (polycystic ovarian syndrome)   •  Chronic GERD   • Obesity, Class III, BMI 40-49.9 (morbid obesity) (CMS/HCC)   • Endometriosis       Past Medical History:   Diagnosis Date   • Chiari I malformation (CMS/HCC)    • DDD (degenerative disc disease), cervical    • Dizziness    • GERD (gastroesophageal reflux disease)    • Headache    • Heart palpitations    • PONV (postoperative nausea and vomiting)        Past Surgical History:   Procedure Laterality Date   • ANKLE SURGERY Right 2014    X2   • CERVICAL CHIARI DECOMPRESSION POSTERIOR N/A 10/25/2016    Procedure: CERVICAL CHIARI DECOMPRESSION POSTERIOR, posterior fossa decompression, C1, C2 laminectomy and expansive duraplasty.;  Surgeon: Jim Santos MD;  Location: [facility] OR;  Service:    •  SECTION  ,   • COLONOSCOPY     • DIAGNOSTIC LAPAROSCOPY  2008    X2   • ENDOSCOPY  2018   • KNEE SURGERY      X6, before age 19       Allergies   Allergen Reactions   • Penicillins Hives and Rash   • Sulfa Antibiotics Hives and Rash         Current Outpatient Medications:   •  esomeprazole (NexIUM) 20 MG capsule, Take 20 mg by mouth every morning before breakfast., Disp: , Rfl:   •  Levonorgest-Eth Est & Eth Est 42-21-21-7 DAYS tablet, Take 1 tablet by mouth Daily., Disp: , Rfl:   •  levothyroxine (SYNTHROID, LEVOTHROID) 75 MCG tablet, , Disp: , Rfl:   •  metoprolol succinate XL (TOPROL-XL) 25 MG 24 hr tablet, Take 1 tablet by mouth Daily., Disp: , Rfl:     Social History     Socioeconomic History   • Marital status:      Spouse name: Not on file   • Number of children: 2   • Years of education: College   • Highest education level: Not on file   Occupational History   • Occupation: RN   Tobacco Use   • Smoking status: Former Smoker     Years: 5.00     Types: Cigarettes     Quit date:      Years since quittin.7   • Smokeless tobacco: Never Used   • Tobacco comment: QUIT    Substance and Sexual Activity   • Alcohol use: Yes     Frequency: Monthly or less     Drinks  per session: 1 or 2     Binge frequency: Never     Comment: social drinker   • Drug use: No   • Sexual activity: Defer     Birth control/protection: Pill       Family History   Problem Relation Age of Onset   • Hypertension Mother    • Obesity Mother    • Cardiomyopathy Father    • Obesity Father    • Hypertension Father    • Heart disease Father    • Sleep apnea Father    • Obesity Brother    • Sleep apnea Brother    • Obesity Maternal Grandmother    • Stroke Maternal Grandmother    • Hypertension Paternal Grandmother    • Heart attack Paternal Grandmother    • Heart disease Paternal Grandmother    • Hypertension Paternal Grandfather    • Heart attack Paternal Grandfather    • Heart disease Paternal Grandfather          Review of Systems:  Review of Systems   Constitutional: Positive for fatigue.   HENT: Negative.    Respiratory: Negative.    Cardiovascular: Negative.    Gastrointestinal: Negative.    Endocrine: Negative.    Genitourinary: Negative.    Musculoskeletal: Negative for back pain.   Skin: Negative.    Neurological: Negative.    Psychiatric/Behavioral: Negative.        Physical Exam:  Vital Signs:  Weight: (!) 141 kg (311 lb)   Body mass index is 48.07 kg/m².  Temp: 97.3 °F (36.3 °C)   Heart Rate: 92   BP: 143/87     Physical Exam  Vitals signs and nursing note reviewed.   Constitutional:       Appearance: She is well-developed.   HENT:      Head: Normocephalic and atraumatic.   Neck:      Musculoskeletal: Normal range of motion.   Cardiovascular:      Rate and Rhythm: Normal rate and regular rhythm.      Heart sounds: Normal heart sounds.   Pulmonary:      Effort: Pulmonary effort is normal. No respiratory distress.      Breath sounds: Normal breath sounds. No wheezing.   Abdominal:      General: Bowel sounds are normal. There is no distension.      Palpations: Abdomen is soft.      Tenderness: There is no abdominal tenderness.   Musculoskeletal:         General: No deformity.   Skin:     General: Skin  is warm and dry.   Neurological:      Mental Status: She is alert and oriented to person, place, and time.   Psychiatric:         Behavior: Behavior normal.            Assessment:         Valentina Anderson is a 40 y.o. year old female with medically complicated severe obesity. Weight: (!) 141 kg (311 lb), Body mass index is 48.07 kg/m². and weight related problems including back pain, knee pain, GERD, polycystic ovarian disease, menstrual irregularities, depression and mental health disease.    I explained in detail the procedures that we are performing.  All of those procedures can be performed laparoscopically but there is a chance to convert to open if any technical challenges or complications do occur.  Bariatric surgery is not cosmetic surgery but rather a tool to help a patient make a life-long commitment lifestyle changes including diet, exercise, behavior changes, and taking supplemental vitamins and minerals.    Due to the patient's BMI and co-morbidities they are at a high risk for surgery and will obtain the following:  The patient has been advised that a letter of medical support and a history and physical must be obtained from her primary care physician. A psychological evaluation will be arranged for this patient. CBC, CMP, FLP, TSH and HgbA1C will be drawn patient be notified with results. Valentina Anderson will obtain a pre-operative CXR and EKG.     Valentina Anderson was screened for sleep apnea in our office today and based on their results she is low risk for SHAAN    Valentina Anderson will be set up for a pre-operative diagnostic esophagogastroduodenoscopy with biopsy for evaluation. The risks and benefits of the procedure were discussed with the patient in detail and all questions were answered.  Possibility of perforation, bleeding, aspiration, anoxic brain injury, respiratory and/or cardiac arrest and death were discussed.   She received handouts regarding, all questions were answered.     The  risks, benefits, alternatives, and potential complications of all of the procedures were explained in detail including, but not limited to death, anesthesia and medication adverse effect/DVT, pulmonary embolism, trocar site/incisional hernia, wound infection, abdominal infection, bleeding, failure to lose weight or gain weight and change in body image, metabolic complications with calcium, thiamine, vitamin B12, folate, iron, and anemia.    The patient was advised to start a high protein, low fat and low carbohydrate diet. The patient was given individualized information by our dietician along with handouts.       The patient was given information regarding the NANI educational video. NANI is an internet based educational video which explains the surgical procedure and answers basic questions regarding the procedure. The patient was provided with instructions and a password to watch the video.    The patient was encouraged to start routine exercise including but not limited to 150 minutes per week. The patient received a resistance band along with a handout of exercises.     The consultation plan was reviewed with the patient.    The patient understands the surgical procedures and the different surgical options that are available.  She understands the lifestyle changes that would be required after surgery and has agreed to participate in a pre-operative and postoperative weight management program.  She also expressed understanding of possible risks, had several questions answered and desires to proceed.    I think she is a good candidate for this surgery, and is interested in a sleeve gastrectomy.    Encounter Diagnoses   Name Primary?   • Obesity, Class III, BMI 40-49.9 (morbid obesity) (CMS/HCC) Yes   • Chronic GERD    • PCOS (polycystic ovarian syndrome)    • Multiple joint pain    • Anxiety and depression    • Endometriosis        Plan:    Patient will have evaluations and follow up with bariatric dieticians and  a psychologist before undergoing a multidisciplinary review of her candidacy.  We also discussed the weight loss requirement and rationale, and other program requirements.      Brittany Barrientos, APRN  9/16/2020

## 2020-09-17 NOTE — PROGRESS NOTES
"Bariatric Nutrition Counseling Interview    Patient Name:  Valentina Anderson  YOB: 1980  Age:  40 y.o.  Sex:  female  MRN: [id number]  Date:  20    Procedure Considering:  Sleeve    Last Documented Height:    Ht Readings from Last 1 Encounters:   20 171.3 cm (67.44\")     Last Documented Weight:   Wt Readings from Last 1 Encounters:   20 (!) 141 kg (311 lb)      Body mass index is 48.07 kg/m².    Highest Weight:  232  Goal Weight: 185    History:  Past Medical History:   Diagnosis Date   • Chiari I malformation (CMS/HCC)    • DDD (degenerative disc disease), cervical    • Dizziness    • GERD (gastroesophageal reflux disease)    • Headache    • Heart palpitations    • PONV (postoperative nausea and vomiting)      Past Surgical History:   Procedure Laterality Date   • ANKLE SURGERY Right 2014    X2   • CERVICAL CHIARI DECOMPRESSION POSTERIOR N/A 10/25/2016    Procedure: CERVICAL CHIARI DECOMPRESSION POSTERIOR, posterior fossa decompression, C1, C2 laminectomy and expansive duraplasty.;  Surgeon: Jim Santos MD;  Location: [facility];  Service:    •  SECTION  ,   • COLONOSCOPY  2013   • DIAGNOSTIC LAPAROSCOPY  2008    X2   • ENDOSCOPY  2018   • KNEE SURGERY      X6, before age 19     Family History   Problem Relation Age of Onset   • Hypertension Mother    • Obesity Mother    • Cardiomyopathy Father    • Obesity Father    • Hypertension Father    • Heart disease Father    • Sleep apnea Father    • Obesity Brother    • Sleep apnea Brother    • Obesity Maternal Grandmother    • Stroke Maternal Grandmother    • Hypertension Paternal Grandmother    • Heart attack Paternal Grandmother    • Heart disease Paternal Grandmother    • Hypertension Paternal Grandfather    • Heart attack Paternal Grandfather    • Heart disease Paternal Grandfather      Social History     Socioeconomic History   • Marital status:      Spouse name: Not on file   • Number of children: " 2   • Years of education: College   • Highest education level: Not on file   Occupational History   • Occupation: RN   Tobacco Use   • Smoking status: Former Smoker     Years: 5.00     Types: Cigarettes     Quit date:      Years since quittin.7   • Smokeless tobacco: Never Used   • Tobacco comment: QUIT    Substance and Sexual Activity   • Alcohol use: Yes     Frequency: Monthly or less     Drinks per session: 1 or 2     Binge frequency: Never     Comment: social drinker   • Drug use: No   • Sexual activity: Defer     Birth control/protection: Pill     Additional Health Issues to Consider:  Depression, PCOS, hypothyroid, GERD, joint pain per patient    Weight History:  Weight gain as a result of an event or condition - in early 20's, PCOS and knee surgeries    Previous Weight Loss Efforts:  Weight Watchers, [employer] diet, Phentermine, Saxenda  Most Successful Weight Loss Effort:  Phentermine, low carb diet - lost about 50lb    Eating Habits: Eat large portions, Eat out of boredom, Snacking on high calorie foods, crave sugar, diet pepsi  Eat three meals on most days?  Yes  Worst eating habit?  Eat large portions, Eat out of boredom, Snacking on high calorie foods    How often do you eat fast food? 2 times weekly    Do you exercise regularly? (at least 3 times each week)  2 times/week    Occupation:  RN    Personal Goal After Procedure:  [place] [employer] [place] with [place]   Personal Support:  family and friends    Assessment:  Program materials for successful weight loss before/after bariatric surgery were provided, reviewed, and discussed. The significance of taking in at least 70g of protein and 64 ounces of fluid was emphasized. The importance of routine exercise was discussed. Nutrition materials provided included a reduced calorie meal plan, protein sources, snack options, and diet guidelines post-surgery. Discussed personal habits and lifestyle behaviors that may influence diet efforts. She demonstrated  a good comprehension of diet requirements and a commitment to work on personal challenges.  Patient was also provided a list of short-term goals to work towards prior to surgery. She appears to be an appropriate candidate for bariatric surgery.    Electronically signed by:  Carolyn Leiva RD  09/17/20 15:13 EDT

## 2020-09-18 ENCOUNTER — HOSPITAL ENCOUNTER (OUTPATIENT)
Dept: OTHER | Facility: HOSPITAL | Age: 40
Discharge: HOME | End: 2020-09-18

## 2020-09-18 LAB
EST. AVERAGE GLUCOSE BLD GHB EST-MCNC: 105 MG/DL
HBA1C MFR BLD: 5.3 % (ref 3.5–5.7)

## 2020-09-21 ENCOUNTER — TELEPHONE (OUTPATIENT)
Dept: BARIATRICS/WEIGHT MGMT | Facility: CLINIC | Age: 40
End: 2020-09-21

## 2020-09-23 DIAGNOSIS — K21.9 CHRONIC GERD: Primary | ICD-10-CM

## 2020-09-24 ENCOUNTER — TRANSCRIBE ORDERS (OUTPATIENT)
Dept: ADMINISTRATIVE | Facility: HOSPITAL | Age: 40
End: 2020-09-24

## 2020-09-24 ENCOUNTER — OFFICE VISIT (OUTPATIENT)
Dept: NEUROSURGERY | Facility: CLINIC | Age: 40
End: 2020-09-24

## 2020-09-24 DIAGNOSIS — M50.323 DEGENERATION OF INTERVERTEBRAL DISC AT C6-C7 LEVEL: Primary | ICD-10-CM

## 2020-09-24 DIAGNOSIS — Z98.890 HISTORY OF BRAIN SURGERY: ICD-10-CM

## 2020-09-24 DIAGNOSIS — Z01.818 OTHER SPECIFIED PRE-OPERATIVE EXAMINATION: Primary | ICD-10-CM

## 2020-09-24 DIAGNOSIS — M54.81 BILATERAL OCCIPITAL NEURALGIA: ICD-10-CM

## 2020-09-24 PROCEDURE — 99441 PR PHYS/QHP TELEPHONE EVALUATION 5-10 MIN: CPT | Performed by: NEUROLOGICAL SURGERY

## 2020-09-25 ENCOUNTER — TELEPHONE (OUTPATIENT)
Dept: BARIATRICS/WEIGHT MGMT | Facility: CLINIC | Age: 40
End: 2020-09-25

## 2020-09-25 NOTE — TELEPHONE ENCOUNTER
Spoke to patient and informed her of normal results.    ----- Message from VINCE Veras sent at 9/24/2020 11:08 AM EDT -----  Normal Hba1c

## 2020-09-26 ENCOUNTER — LAB (OUTPATIENT)
Dept: LAB | Facility: HOSPITAL | Age: 40
End: 2020-09-26

## 2020-09-26 DIAGNOSIS — Z01.818 OTHER SPECIFIED PRE-OPERATIVE EXAMINATION: ICD-10-CM

## 2020-09-26 PROCEDURE — U0004 COV-19 TEST NON-CDC HGH THRU: HCPCS

## 2020-09-26 PROCEDURE — C9803 HOPD COVID-19 SPEC COLLECT: HCPCS

## 2020-09-28 ENCOUNTER — ANESTHESIA EVENT (OUTPATIENT)
Dept: GASTROENTEROLOGY | Facility: HOSPITAL | Age: 40
End: 2020-09-28

## 2020-09-28 LAB — SARS-COV-2 RNA RESP QL NAA+PROBE: NOT DETECTED

## 2020-09-29 ENCOUNTER — ANESTHESIA (OUTPATIENT)
Dept: GASTROENTEROLOGY | Facility: HOSPITAL | Age: 40
End: 2020-09-29

## 2020-09-29 ENCOUNTER — HOSPITAL ENCOUNTER (OUTPATIENT)
Facility: HOSPITAL | Age: 40
Setting detail: HOSPITAL OUTPATIENT SURGERY
Discharge: HOME | End: 2020-09-29
Attending: SURGERY | Admitting: SURGERY

## 2020-09-29 VITALS
HEIGHT: 67 IN | DIASTOLIC BLOOD PRESSURE: 81 MMHG | SYSTOLIC BLOOD PRESSURE: 125 MMHG | TEMPERATURE: 98.8 F | OXYGEN SATURATION: 97 % | HEART RATE: 73 BPM | WEIGHT: 293 LBS | BODY MASS INDEX: 45.99 KG/M2 | RESPIRATION RATE: 14 BRPM

## 2020-09-29 DIAGNOSIS — K21.9 CHRONIC GERD: ICD-10-CM

## 2020-09-29 PROBLEM — K31.7 MULTIPLE GASTRIC POLYPS: Status: ACTIVE | Noted: 2020-09-29

## 2020-09-29 PROBLEM — K44.9 HIATAL HERNIA: Status: ACTIVE | Noted: 2020-09-29

## 2020-09-29 LAB
B-HCG UR QL: NEGATIVE
INTERNAL NEGATIVE CONTROL: NEGATIVE
INTERNAL POSITIVE CONTROL: POSITIVE
Lab: NORMAL

## 2020-09-29 PROCEDURE — 81025 URINE PREGNANCY TEST: CPT | Performed by: SURGERY

## 2020-09-29 PROCEDURE — 88305 TISSUE EXAM BY PATHOLOGIST: CPT | Performed by: SURGERY

## 2020-09-29 PROCEDURE — 43239 EGD BIOPSY SINGLE/MULTIPLE: CPT | Performed by: SURGERY

## 2020-09-29 PROCEDURE — 25010000002 PROPOFOL 10 MG/ML EMULSION: Performed by: ANESTHESIOLOGY

## 2020-09-29 PROCEDURE — 87081 CULTURE SCREEN ONLY: CPT | Performed by: SURGERY

## 2020-09-29 RX ORDER — PROPOFOL 10 MG/ML
VIAL (ML) INTRAVENOUS CONTINUOUS PRN
Status: DISCONTINUED | OUTPATIENT
Start: 2020-09-29 | End: 2020-09-29 | Stop reason: SURG

## 2020-09-29 RX ORDER — SODIUM CHLORIDE, SODIUM LACTATE, POTASSIUM CHLORIDE, CALCIUM CHLORIDE 600; 310; 30; 20 MG/100ML; MG/100ML; MG/100ML; MG/100ML
30 INJECTION, SOLUTION INTRAVENOUS CONTINUOUS PRN
Status: DISCONTINUED | OUTPATIENT
Start: 2020-09-29 | End: 2020-09-29 | Stop reason: HOSPADM

## 2020-09-29 RX ORDER — LIDOCAINE HYDROCHLORIDE 20 MG/ML
INJECTION, SOLUTION INFILTRATION; PERINEURAL AS NEEDED
Status: DISCONTINUED | OUTPATIENT
Start: 2020-09-29 | End: 2020-09-29 | Stop reason: SURG

## 2020-09-29 RX ORDER — PROPOFOL 10 MG/ML
VIAL (ML) INTRAVENOUS AS NEEDED
Status: DISCONTINUED | OUTPATIENT
Start: 2020-09-29 | End: 2020-09-29 | Stop reason: SURG

## 2020-09-29 RX ADMIN — PROPOFOL 220 MG: 10 INJECTION, EMULSION INTRAVENOUS at 10:29

## 2020-09-29 RX ADMIN — LIDOCAINE HYDROCHLORIDE 50 MG: 20 INJECTION, SOLUTION INFILTRATION; PERINEURAL at 10:29

## 2020-09-29 RX ADMIN — SODIUM CHLORIDE, POTASSIUM CHLORIDE, SODIUM LACTATE AND CALCIUM CHLORIDE 30 ML/HR: 600; 310; 30; 20 INJECTION, SOLUTION INTRAVENOUS at 10:08

## 2020-09-29 RX ADMIN — PROPOFOL 140 MCG/KG/MIN: 10 INJECTION, EMULSION INTRAVENOUS at 10:32

## 2020-09-29 NOTE — ANESTHESIA PREPROCEDURE EVALUATION
Anesthesia Evaluation     Patient summary reviewed and Nursing notes reviewed   history of anesthetic complications: PONV  NPO Solid Status: > 8 hours  NPO Liquid Status: > 2 hours           Airway   Mallampati: II  TM distance: >3 FB  Neck ROM: full  No difficulty expected  Dental - normal exam     Pulmonary - negative pulmonary ROS and normal exam    breath sounds clear to auscultation  Cardiovascular - normal exam    Rhythm: regular  Rate: normal    (+) dysrhythmias Tachycardia,       Neuro/Psych  (+) headaches, dizziness/light headedness, psychiatric history Anxiety and Depression,       ROS Comment: Chiari malformation, repaired  GI/Hepatic/Renal/Endo    (+) morbid obesity, GERD,  thyroid problem hypothyroidism  (-)  obesity    Musculoskeletal     (+) neck pain,   Abdominal   (+) obese,    Substance History - negative use     OB/GYN negative ob/gyn ROS         Other                        Anesthesia Plan    ASA 3     MAC     intravenous induction     Anesthetic plan, all risks, benefits, and alternatives have been provided, discussed and informed consent has been obtained with: patient.

## 2020-09-29 NOTE — ANESTHESIA POSTPROCEDURE EVALUATION
Patient: Valentina Anderson    Procedure Summary     Date: 09/29/20 Room / Location:  [facility]    Anesthesia Start: 1027 Anesthesia Stop: 1042    Procedure: ESOPHAGOGASTRODUODENOSCOPY WITH BIOPSY (N/A Esophagus) Diagnosis:       Chronic GERD      (Chronic GERD [K21.9])    Surgeon: Marc Ramirez Jr., MD Provider: Reba Nayak MD    Anesthesia Type: MAC ASA Status: 3          Anesthesia Type: MAC    Vitals  Vitals Value Taken Time   /81 09/29/20 1103   Temp     Pulse 73 09/29/20 1103   Resp     SpO2 97 % 09/29/20 1103           Post Anesthesia Care and Evaluation    Patient location during evaluation: PACU  Patient participation: complete - patient participated  Level of consciousness: awake and alert  Pain management: adequate  Airway patency: patent  Anesthetic complications: No anesthetic complications  PONV Status: none  Cardiovascular status: acceptable  Respiratory status: acceptable  Hydration status: acceptable

## 2020-09-30 ENCOUNTER — TELEPHONE (OUTPATIENT)
Dept: BARIATRICS/WEIGHT MGMT | Facility: CLINIC | Age: 40
End: 2020-09-30

## 2020-09-30 LAB
CYTO UR: NORMAL
LAB AP CASE REPORT: NORMAL
PATH REPORT.FINAL DX SPEC: NORMAL
PATH REPORT.GROSS SPEC: NORMAL
UREASE TISS QL: NEGATIVE

## 2020-09-30 NOTE — TELEPHONE ENCOUNTER
Spoke to patient and informed her of negative results. Patient did not have any questions at this time.    ----- Message from Marc Ramirez Jr., MD sent at 9/30/2020 11:41 AM EDT -----  Please call patient with negative results.

## 2020-10-20 ENCOUNTER — PREP FOR SURGERY (OUTPATIENT)
Dept: OTHER | Facility: HOSPITAL | Age: 40
End: 2020-10-20

## 2020-10-20 DIAGNOSIS — E66.01 OBESITY, CLASS III, BMI 40-49.9 (MORBID OBESITY) (HCC): Primary | ICD-10-CM

## 2020-10-20 DIAGNOSIS — K44.9 HIATAL HERNIA: ICD-10-CM

## 2020-10-20 RX ORDER — METOCLOPRAMIDE HYDROCHLORIDE 5 MG/ML
10 INJECTION INTRAMUSCULAR; INTRAVENOUS ONCE
Status: CANCELLED | OUTPATIENT
Start: 2020-11-04 | End: 2020-10-20

## 2020-10-20 RX ORDER — SODIUM CHLORIDE, SODIUM LACTATE, POTASSIUM CHLORIDE, CALCIUM CHLORIDE 600; 310; 30; 20 MG/100ML; MG/100ML; MG/100ML; MG/100ML
100 INJECTION, SOLUTION INTRAVENOUS CONTINUOUS
Status: CANCELLED | OUTPATIENT
Start: 2020-11-04

## 2020-10-20 RX ORDER — SODIUM CHLORIDE 0.9 % (FLUSH) 0.9 %
3 SYRINGE (ML) INJECTION EVERY 12 HOURS SCHEDULED
Status: CANCELLED | OUTPATIENT
Start: 2020-10-20

## 2020-10-20 RX ORDER — SCOLOPAMINE TRANSDERMAL SYSTEM 1 MG/1
1 PATCH, EXTENDED RELEASE TRANSDERMAL CONTINUOUS
Status: CANCELLED | OUTPATIENT
Start: 2020-11-04 | End: 2020-11-07

## 2020-10-20 RX ORDER — CHLORHEXIDINE GLUCONATE 0.12 MG/ML
15 RINSE ORAL SEE ADMIN INSTRUCTIONS
Status: CANCELLED | OUTPATIENT
Start: 2020-11-04

## 2020-10-20 RX ORDER — ACETAMINOPHEN 160 MG/5ML
975 SOLUTION ORAL ONCE
Status: CANCELLED | OUTPATIENT
Start: 2020-11-04 | End: 2020-10-20

## 2020-10-20 RX ORDER — PANTOPRAZOLE SODIUM 40 MG/10ML
40 INJECTION, POWDER, LYOPHILIZED, FOR SOLUTION INTRAVENOUS ONCE
Status: CANCELLED | OUTPATIENT
Start: 2020-11-04 | End: 2020-10-20

## 2020-10-20 RX ORDER — SODIUM CHLORIDE 0.9 % (FLUSH) 0.9 %
3-10 SYRINGE (ML) INJECTION AS NEEDED
Status: CANCELLED | OUTPATIENT
Start: 2020-11-04

## 2020-10-22 ENCOUNTER — CONSULT (OUTPATIENT)
Dept: BARIATRICS/WEIGHT MGMT | Facility: CLINIC | Age: 40
End: 2020-10-22

## 2020-10-22 ENCOUNTER — LAB (OUTPATIENT)
Dept: LAB | Facility: HOSPITAL | Age: 40
End: 2020-10-22

## 2020-10-22 VITALS
WEIGHT: 293 LBS | TEMPERATURE: 97.3 F | RESPIRATION RATE: 18 BRPM | BODY MASS INDEX: 45.99 KG/M2 | HEART RATE: 86 BPM | HEIGHT: 67 IN | DIASTOLIC BLOOD PRESSURE: 80 MMHG | SYSTOLIC BLOOD PRESSURE: 134 MMHG

## 2020-10-22 DIAGNOSIS — K31.7 MULTIPLE GASTRIC POLYPS: ICD-10-CM

## 2020-10-22 DIAGNOSIS — E66.01 OBESITY, CLASS III, BMI 40-49.9 (MORBID OBESITY) (HCC): ICD-10-CM

## 2020-10-22 DIAGNOSIS — M25.50 MULTIPLE JOINT PAIN: ICD-10-CM

## 2020-10-22 DIAGNOSIS — K44.9 HIATAL HERNIA: ICD-10-CM

## 2020-10-22 DIAGNOSIS — G93.5 CHIARI MALFORMATION TYPE I (HCC): ICD-10-CM

## 2020-10-22 DIAGNOSIS — E66.01 OBESITY, CLASS III, BMI 40-49.9 (MORBID OBESITY) (HCC): Primary | ICD-10-CM

## 2020-10-22 DIAGNOSIS — E28.2 PCOS (POLYCYSTIC OVARIAN SYNDROME): ICD-10-CM

## 2020-10-22 DIAGNOSIS — F32.A ANXIETY AND DEPRESSION: ICD-10-CM

## 2020-10-22 DIAGNOSIS — R53.82 CHRONIC FATIGUE: ICD-10-CM

## 2020-10-22 DIAGNOSIS — K21.9 CHRONIC GERD: ICD-10-CM

## 2020-10-22 DIAGNOSIS — F41.9 ANXIETY AND DEPRESSION: ICD-10-CM

## 2020-10-22 DIAGNOSIS — M50.323 DEGENERATION OF INTERVERTEBRAL DISC AT C6-C7 LEVEL: ICD-10-CM

## 2020-10-22 LAB
ALBUMIN SERPL-MCNC: 4 G/DL (ref 3.5–5.2)
ALBUMIN/GLOB SERPL: 1.3 G/DL
ALP SERPL-CCNC: 67 U/L (ref 39–117)
ALT SERPL W P-5'-P-CCNC: 24 U/L (ref 1–33)
ANION GAP SERPL CALCULATED.3IONS-SCNC: 8.3 MMOL/L (ref 5–15)
AST SERPL-CCNC: 22 U/L (ref 1–32)
BILIRUB SERPL-MCNC: 0.2 MG/DL (ref 0–1.2)
BUN SERPL-MCNC: 10 MG/DL (ref 6–20)
BUN/CREAT SERPL: 10.8 (ref 7–25)
CALCIUM SPEC-SCNC: 8.6 MG/DL (ref 8.6–10.5)
CHLORIDE SERPL-SCNC: 105 MMOL/L (ref 98–107)
CO2 SERPL-SCNC: 23.7 MMOL/L (ref 22–29)
CREAT SERPL-MCNC: 0.93 MG/DL (ref 0.57–1)
DEPRECATED RDW RBC AUTO: 41.7 FL (ref 37–54)
ERYTHROCYTE [DISTWIDTH] IN BLOOD BY AUTOMATED COUNT: 13.6 % (ref 12.3–15.4)
GFR SERPL CREATININE-BSD FRML MDRD: 66 ML/MIN/1.73
GLOBULIN UR ELPH-MCNC: 3 GM/DL
GLUCOSE SERPL-MCNC: 83 MG/DL (ref 65–99)
HCT VFR BLD AUTO: 41.2 % (ref 34–46.6)
HGB BLD-MCNC: 13.7 G/DL (ref 12–15.9)
MCH RBC QN AUTO: 28 PG (ref 26.6–33)
MCHC RBC AUTO-ENTMCNC: 33.3 G/DL (ref 31.5–35.7)
MCV RBC AUTO: 84.1 FL (ref 79–97)
PLATELET # BLD AUTO: 347 10*3/MM3 (ref 140–450)
PMV BLD AUTO: 10.2 FL (ref 6–12)
POTASSIUM SERPL-SCNC: 3.9 MMOL/L (ref 3.5–5.2)
PROT SERPL-MCNC: 7 G/DL (ref 6–8.5)
RBC # BLD AUTO: 4.9 10*6/MM3 (ref 3.77–5.28)
SODIUM SERPL-SCNC: 137 MMOL/L (ref 136–145)
WBC # BLD AUTO: 8.31 10*3/MM3 (ref 3.4–10.8)

## 2020-10-22 PROCEDURE — 36415 COLL VENOUS BLD VENIPUNCTURE: CPT

## 2020-10-22 PROCEDURE — 85027 COMPLETE CBC AUTOMATED: CPT

## 2020-10-22 PROCEDURE — 80053 COMPREHEN METABOLIC PANEL: CPT

## 2020-10-22 PROCEDURE — 99215 OFFICE O/P EST HI 40 MIN: CPT | Performed by: SURGERY

## 2020-10-22 RX ORDER — URSODIOL 300 MG/1
300 CAPSULE ORAL 2 TIMES DAILY
Qty: 60 CAPSULE | Refills: 5 | Status: SHIPPED | OUTPATIENT
Start: 2020-10-22 | End: 2021-05-19

## 2020-10-22 RX ORDER — DICYCLOMINE HCL 20 MG
20 TABLET ORAL AS NEEDED
COMMUNITY
Start: 2020-10-12 | End: 2020-11-10

## 2020-10-22 NOTE — H&P
Bariatric Consult:  Referred by Anna Carias APRN    Valentina Anderson is here today for consult on Consult (SLE[facility]E CONSULTATION)      History of Present Illness:     Valentina Anderson is a 40 y.o. female with morbid obesity with co-morbidities including hypertension, back pain, knee pain and GERD who presents for surgical consultation for the above procedure. Valentina has completed the initial intake visit and has been examined by our nurse practitioner, dietician, psychologist and underwent the extensive educational teaching process under the guidance of our bariatric coordinator and myself. Valentina also has seen the educational video NANI on the surgical procedure if available. Valentina attended today more educational teaching from our bariatric coordinator and myself. Valentina has had an extensive medical workup including a visit with their primary care physician, EKG, chest radiograph, blood work, EGD or UGI and possibly further testing. These have been reviewed by me and discussed with the patient. Valentina is now ready to proceed with surgery. Valentina presently denies nausea, vomiting, fever, chills, chest pain, shortness of air, melena, hematochezia, hemetemesis, dysuria, frequency, hematuria, jaundice or abdominal pain.       Past Medical History:   Diagnosis Date   • Chiari I malformation (CMS/HCC)    • DDD (degenerative disc disease), cervical    • Disease of thyroid gland    • Dizziness    • GERD (gastroesophageal reflux disease)    • Headache    • Heart palpitations    • PONV (postoperative nausea and vomiting)        Encounter Diagnoses   Name Primary?   • Obesity, Class III, BMI 40-49.9 (morbid obesity) (CMS/HCC) Yes   • Hiatal hernia    • Multiple gastric polyps    • Chronic GERD    • Chronic fatigue    • Chiari malformation type I (CMS/HCC)    • Anxiety and depression    • PCOS (polycystic ovarian syndrome)    • Multiple joint pain    • Degeneration of intervertebral disc at C6-C7 level         Past Surgical History:   Procedure Laterality Date   • ANKLE SURGERY Right 2014    X2   • CERVICAL CHIARI DECOMPRESSION POSTERIOR N/A 10/25/2016    Procedure: CERVICAL CHIARI DECOMPRESSION POSTERIOR, posterior fossa decompression, C1, C2 laminectomy and expansive duraplasty.;  Surgeon: Jim Santos MD;  Location: [facility] MAIN OR;  Service:    •  SECTION  ,   • COLONOSCOPY     • DIAGNOSTIC LAPAROSCOPY  2008    X2   • ENDOSCOPY  2018   • ENDOSCOPY N/A 2020    Procedure: ESOPHAGOGASTRODUODENOSCOPY WITH BIOPSY;  Surgeon: Marc Ramirez Jr., MD;  Location: [facility] ENDOSCOPY;  Service: General;  Laterality: N/A;  PRE-GERD, H/O GASTRIC POLYPS  POST- GASTRIC POLYPS, HIATAL HERNIA, GASTRITIS   • KNEE SURGERY      X6, before age 19       Patient Active Problem List   Diagnosis   • Chiari malformation type I (CMS/HCC)   • Dizziness   • New daily persistent headache   • Surgery follow-up examination   • History of brain surgery   • Degeneration of intervertebral disc at C6-C7 level   • Bilateral occipital neuralgia   • Chronic fatigue   • Multiple joint pain   • Anxiety and depression   • Hypothyroid   • PCOS (polycystic ovarian syndrome)   • Chronic GERD   • Obesity, Class III, BMI 40-49.9 (morbid obesity) (CMS/HCC)   • Endometriosis   • Hiatal hernia   • Multiple gastric polyps       Allergies   Allergen Reactions   • Penicillins Hives and Rash   • Sulfa Antibiotics Hives and Rash         Current Outpatient Medications:   •  dicyclomine (BENTYL) 20 MG tablet, , Disp: , Rfl:   •  esomeprazole (NexIUM) 20 MG capsule, Take 20 mg by mouth every morning before breakfast., Disp: , Rfl:   •  levothyroxine (SYNTHROID, LEVOTHROID) 75 MCG tablet, , Disp: , Rfl:   •  metoprolol succinate XL (TOPROL-XL) 25 MG 24 hr tablet, Take 1 tablet by mouth Daily., Disp: , Rfl:   •  folic acid-vit B6-vit B12 (FOLBEE) 2.5-25-1 MG tablet tablet, Take 1 tablet by mouth Daily., Disp: 40 tablet, Rfl: 0  •   ursodiol (Actigall) 300 MG capsule, Take 1 capsule by mouth 2 (Two) Times a Day., Disp: 60 capsule, Rfl: 5    Social History     Socioeconomic History   • Marital status:      Spouse name: Not on file   • Number of children: 2   • Years of education: College   • Highest education level: Not on file   Occupational History   • Occupation: RN   Tobacco Use   • Smoking status: Former Smoker     Years: 5.00     Types: Cigarettes     Quit date:      Years since quittin.8   • Smokeless tobacco: Never Used   • Tobacco comment: QUIT    Substance and Sexual Activity   • Alcohol use: Yes     Frequency: Monthly or less     Drinks per session: 1 or 2     Binge frequency: Never     Comment: social drinker   • Drug use: No   • Sexual activity: Defer     Birth control/protection: Pill       Family History   Problem Relation Age of Onset   • Hypertension Mother    • Obesity Mother    • Cardiomyopathy Father    • Obesity Father    • Hypertension Father    • Heart disease Father    • Sleep apnea Father    • Obesity Brother    • Sleep apnea Brother    • Obesity Maternal Grandmother    • Stroke Maternal Grandmother    • Hypertension Paternal Grandmother    • Heart attack Paternal Grandmother    • Heart disease Paternal Grandmother    • Hypertension Paternal Grandfather    • Heart attack Paternal Grandfather    • Heart disease Paternal Grandfather        Review of Systems:  Review of Systems   Constitutional: Positive for fatigue.   Musculoskeletal: Positive for arthralgias, back pain and neck pain.   All other systems reviewed and are negative.        Physical Exam:    Vital Signs:  Weight: (!) 137 kg (303 lb)   Body mass index is 46.84 kg/m².  Temp: 97.3 °F (36.3 °C)   Heart Rate: 86   BP: 134/80       Physical Exam  Vitals signs reviewed.   HENT:      Head: Normocephalic and atraumatic.      Mouth/Throat:      Mouth: Mucous membranes are moist.      Pharynx: Oropharynx is clear.   Eyes:      General: No scleral  icterus.     Extraocular Movements: Extraocular movements intact.      Conjunctiva/sclera: Conjunctivae normal.      Pupils: Pupils are equal, round, and reactive to light.   Neck:      Musculoskeletal: Normal range of motion and neck supple.      Thyroid: No thyromegaly.   Cardiovascular:      Rate and Rhythm: Normal rate.   Pulmonary:      Effort: Pulmonary effort is normal. No respiratory distress.      Breath sounds: Normal breath sounds. No stridor. No wheezing or rhonchi.   Abdominal:      General: Bowel sounds are normal. There is no distension.      Palpations: Abdomen is soft. There is no mass.      Tenderness: There is no abdominal tenderness. There is no right CVA tenderness, left CVA tenderness, guarding or rebound.      Hernia: No hernia is present.   Musculoskeletal: Normal range of motion.   Lymphadenopathy:      Cervical: No cervical adenopathy.   Skin:     General: Skin is warm and dry.      Findings: No erythema.   Neurological:      Mental Status: She is alert and oriented to person, place, and time.   Psychiatric:         Mood and Affect: Mood normal.         Behavior: Behavior normal.         Thought Content: Thought content normal.         Judgment: Judgment normal.           Assessment:    Valentina Anderson is a 40 y.o. year old female with medically complicated severe obesity with a BMI of Body mass index is 46.84 kg/m². and multiple co-morbidities listed in the encounter diagnosis.    I think she is an appropriate candidate for this surgery, and is ready to proceed.      Plan/Discussion/Summary:  Small hernia per me.  Patient does take PPI.  H. pylori negative.  Polyps benign    The patient has returned to the office for a surgical consultation and has requested to proceed with a laparoscopic gastric sleeve.  I have had the opportunity to obtain a history, examine the patient and review the patient's chart.    The patient understands that surgery is a tool and that weight loss is not  guaranteed but only seen in the context of appropriate use, regular follow up, exercise and making appropriate food choices.     I personally discussed the potential complications of the laparoscopic gastric sleeve with this patient.  The patient is well aware of potential complications of the surgery that include but not limited to bleeding, infections, deep vein thrombosis, pulmonary embolism, pulmonary complications such as pneumonia, cardiac event, hernias, small bowel obstruction, damage to the spleen or other organs, bowel injury, disfiguring scars, failure to lose weight, need for additional surgery, conversion to an open procedure and death.  The patient is also aware of complications which apply in particular to the gastric sleeve and can include but not limited to the leakage of gastric contents at the staple line, the development of an intra-abdominal abscess, gastroesophageal reflux disease, Canada's esophagus, ulcers, vitamin/mineral deficiencies, strictures, and the possibility of converting this procedure to a Jovan-en-Y gastric bypass. The patient also understands the possibility of requiring an acid reducer medication for the rest of their life.    The risks, benefits, potential complications and alternative therapies were discussed at great length as outlined in our extensive consent forms, online consent and educational teaching processes.    The patient has confirmed the participation in the programs extensive educational activities.    All questions and concerns were answered to patient's satisfaction.  The patient now wishes to proceed with surgery.    Patient has declined the pre-operative insertion of an IVC filter.     The patient has declined a postoperative course of anitcoagulant therapy.      I instructed patient to start on a H2 blocker or proton pump inhibitor if not already on one of these medications.    I explained in detail the procedures that we perform.  All of these procedures  have a chance to convert to open if any technical challenges or complications do occur.  Bariatric surgery is not cosmetic surgery but rather a tool to help a patient make a life-long commitment lifestyle change including diet, exercise, behavior changes, and taking supplemental vitamins and minerals.    Problems after surgery may require more operations to correct them.    The risks, benefits, alternatives, and potential complications of all of the procedures were explained in detail including, but not limited to death, anesthesia and medication adverse effect, deep venous thrombosis, pulmonary embolism, trocar site/incisional hernia, wound infection, abdominal infection, bleeding, failure to lose weight, gain weight, a change in body image, metabolic complications with vitamin deficiences and anemia.    Weight loss expectations were discussed with the patient in detail. The weight loss operations most commonly performed are the sleeve gastrectomy and the Jovan-en-Y gastric bypass. These operations result in weight losses up to approximately 25-35% of initial body weight 12 to 24 months after surgery with the gastric bypass usually the higher percent of weight loss but depends on patient using the tool.    For the gastric bypass and loop duodenal switch (SENTHIL-S) the risks include but not limited to the following early complications:  Anastomotic leak/peritonitis, Jovan/Alimentary/biliopancreatic limb obstruction, severe & minor wound infection/seroma, and nausea/vomiting.  Late complications can include but are not limited to malnutrition, vitamin deficiencies, frequent loose stools,  stomal stenosis, marginal ulcer, bowel obstruction, intussusception, internal, and incisional hernia.    Regarding the gastric sleeve, there is less long-term outcome data and higher risk of dysphagia and reflux compared to a gastric bypass, as well as risk of internal visceral/organ injury, splenectomy, bleeding, infection, leak (which  could require further intervention possible conversion to Jovan-en-Y gastric bypass), stenosis and possibility of regaining weight.    Valentina was counseled regarding diagnostic results, instructions for management, risk factor reductions, prognosis, patient and family education, impressions, risks and benefits of treatment options and importance of compliance with treatment. Total face to face time of the encounter was over 45 minutes and over 30 minutes was spent counseling.     Phi Report   As part of this patient's treatment plan I am prescribing controlled substances. The patient has been made aware of appropriate use of such medications, including potential risk of somnolence, limited ability to drive and /or work safely, and potential for dependence or overdose. It has also been made clear that these medications are for use by this patient only, without concomitant use of alcohol or other substances unless prescribed.    Valentina has completed prescribing agreement detailing terms of continued prescribing of controlled substances, including monitoring PHI reports, urine drug screening, and pill counts if necessary. Valentina is aware that inappropriate use will result in cessation of prescribing such medications.    PHI report has been reviewed      History and physical exam exhibit continued safe and appropriate use of controlled substances.      Valentina understands the surgical procedures and the different surgical options that are available.  She understands the lifestyle changes that are required after surgery and has agreed to follow the guidelines outlined in the weight management program.  She also expressed understanding of the risks involved and had all of female questions answered and desires to proceed.      Marc Ramirez MD  10/22/2020

## 2020-10-22 NOTE — PATIENT INSTRUCTIONS
Bariatric Manual    You were provided a manual specific to the procedure that you have chosen.  Please refer to that with any questions or call the office at [phone]

## 2020-11-02 ENCOUNTER — APPOINTMENT (OUTPATIENT)
Dept: PREADMISSION TESTING | Facility: HOSPITAL | Age: 40
End: 2020-11-02

## 2020-11-02 LAB — HCG SERPL QL: NEGATIVE

## 2020-11-02 PROCEDURE — U0004 COV-19 TEST NON-CDC HGH THRU: HCPCS | Performed by: SURGERY

## 2020-11-02 PROCEDURE — C9803 HOPD COVID-19 SPEC COLLECT: HCPCS | Performed by: SURGERY

## 2020-11-02 PROCEDURE — 36415 COLL VENOUS BLD VENIPUNCTURE: CPT

## 2020-11-02 PROCEDURE — 84703 CHORIONIC GONADOTROPIN ASSAY: CPT | Performed by: SURGERY

## 2020-11-03 LAB — SARS-COV-2 RNA RESP QL NAA+PROBE: NOT DETECTED

## 2020-11-04 ENCOUNTER — ANESTHESIA (OUTPATIENT)
Dept: PERIOP | Facility: HOSPITAL | Age: 40
End: 2020-11-04

## 2020-11-04 ENCOUNTER — HOSPITAL ENCOUNTER (INPATIENT)
Facility: HOSPITAL | Age: 40
LOS: 1 days | Discharge: HOME | End: 2020-11-05
Attending: SURGERY | Admitting: SURGERY

## 2020-11-04 ENCOUNTER — ANESTHESIA EVENT (OUTPATIENT)
Dept: PERIOP | Facility: HOSPITAL | Age: 40
End: 2020-11-04

## 2020-11-04 DIAGNOSIS — M50.323 DEGENERATION OF INTERVERTEBRAL DISC AT C6-C7 LEVEL: ICD-10-CM

## 2020-11-04 DIAGNOSIS — G44.52 NEW DAILY PERSISTENT HEADACHE: ICD-10-CM

## 2020-11-04 DIAGNOSIS — Z98.890 HISTORY OF BRAIN SURGERY: ICD-10-CM

## 2020-11-04 DIAGNOSIS — M54.81 BILATERAL OCCIPITAL NEURALGIA: ICD-10-CM

## 2020-11-04 DIAGNOSIS — E28.2 PCOS (POLYCYSTIC OVARIAN SYNDROME): ICD-10-CM

## 2020-11-04 DIAGNOSIS — Z98.84 S/P LAPAROSCOPIC SLEEVE GASTRECTOMY: Primary | ICD-10-CM

## 2020-11-04 DIAGNOSIS — K31.7 MULTIPLE GASTRIC POLYPS: ICD-10-CM

## 2020-11-04 DIAGNOSIS — N80.9 ENDOMETRIOSIS: ICD-10-CM

## 2020-11-04 DIAGNOSIS — G93.5 CHIARI MALFORMATION TYPE I (HCC): ICD-10-CM

## 2020-11-04 DIAGNOSIS — F41.9 ANXIETY AND DEPRESSION: ICD-10-CM

## 2020-11-04 DIAGNOSIS — R42 DIZZINESS: ICD-10-CM

## 2020-11-04 DIAGNOSIS — F32.A ANXIETY AND DEPRESSION: ICD-10-CM

## 2020-11-04 DIAGNOSIS — K21.9 CHRONIC GERD: ICD-10-CM

## 2020-11-04 DIAGNOSIS — Z09 SURGERY FOLLOW-UP EXAMINATION: ICD-10-CM

## 2020-11-04 DIAGNOSIS — M25.50 MULTIPLE JOINT PAIN: ICD-10-CM

## 2020-11-04 DIAGNOSIS — K44.9 HIATAL HERNIA: ICD-10-CM

## 2020-11-04 DIAGNOSIS — E66.01 OBESITY, CLASS III, BMI 40-49.9 (MORBID OBESITY) (HCC): ICD-10-CM

## 2020-11-04 DIAGNOSIS — R53.82 CHRONIC FATIGUE: ICD-10-CM

## 2020-11-04 PROCEDURE — 25010000002 ONDANSETRON PER 1 MG: Performed by: NURSE ANESTHETIST, CERTIFIED REGISTERED

## 2020-11-04 PROCEDURE — 0BQT4ZZ REPAIR DIAPHRAGM, PERCUTANEOUS ENDOSCOPIC APPROACH: ICD-10-PCS | Performed by: SURGERY

## 2020-11-04 PROCEDURE — 88307 TISSUE EXAM BY PATHOLOGIST: CPT | Performed by: SURGERY

## 2020-11-04 PROCEDURE — 25010000002 VANCOMYCIN 10 G RECONSTITUTED SOLUTION: Performed by: SURGERY

## 2020-11-04 PROCEDURE — 25010000002 ONDANSETRON PER 1 MG: Performed by: ANESTHESIOLOGY

## 2020-11-04 PROCEDURE — 25010000002 ENOXAPARIN PER 10 MG: Performed by: SURGERY

## 2020-11-04 PROCEDURE — 25010000002 METOCLOPRAMIDE PER 10 MG: Performed by: SURGERY

## 2020-11-04 PROCEDURE — 25010000002 HYDROMORPHONE PER 4 MG: Performed by: NURSE ANESTHETIST, CERTIFIED REGISTERED

## 2020-11-04 PROCEDURE — 25010000002 MIDAZOLAM PER 1 MG: Performed by: ANESTHESIOLOGY

## 2020-11-04 PROCEDURE — 63710000001 PROMETHAZINE PER 25 MG: Performed by: NURSE ANESTHETIST, CERTIFIED REGISTERED

## 2020-11-04 PROCEDURE — 43775 LAP SLEEVE GASTRECTOMY: CPT | Performed by: SURGERY

## 2020-11-04 PROCEDURE — 43281 LAP PARAESOPHAG HERN REPAIR: CPT | Performed by: SURGERY

## 2020-11-04 PROCEDURE — 25010000002 FENTANYL CITRATE (PF) 100 MCG/2ML SOLUTION: Performed by: NURSE ANESTHETIST, CERTIFIED REGISTERED

## 2020-11-04 PROCEDURE — 25010000002 PROPOFOL 10 MG/ML EMULSION: Performed by: NURSE ANESTHETIST, CERTIFIED REGISTERED

## 2020-11-04 PROCEDURE — 0DB64Z3 EXCISION OF STOMACH, PERCUTANEOUS ENDOSCOPIC APPROACH, VERTICAL: ICD-10-PCS | Performed by: SURGERY

## 2020-11-04 PROCEDURE — 25010000002 DEXAMETHASONE PER 1 MG: Performed by: NURSE ANESTHETIST, CERTIFIED REGISTERED

## 2020-11-04 PROCEDURE — 25010000002 FENTANYL CITRATE (PF) 100 MCG/2ML SOLUTION: Performed by: ANESTHESIOLOGY

## 2020-11-04 DEVICE — STPL/LN REINF PERISTRIP DRY VERITAS THN: Type: IMPLANTABLE DEVICE | Site: ABDOMEN | Status: FUNCTIONAL

## 2020-11-04 DEVICE — SEALANT WND FIBRIN TISSEEL PREFIL/SYR/PRIMAFZ 4ML: Type: IMPLANTABLE DEVICE | Site: ABDOMEN | Status: FUNCTIONAL

## 2020-11-04 DEVICE — ENDOPATH ECHELON ENDOSCOPIC LINEAR CUTTER RELOADS, GREEN, 60MM
Type: IMPLANTABLE DEVICE | Site: ABDOMEN | Status: FUNCTIONAL
Brand: ECHELON ENDOPATH

## 2020-11-04 DEVICE — ENDOPATH ECHELON ENDOSCOPIC LINEAR CUTTER RELOADS, GOLD, 60MM
Type: IMPLANTABLE DEVICE | Site: ABDOMEN | Status: FUNCTIONAL
Brand: ECHELON ENDOPATH

## 2020-11-04 RX ORDER — ALBUTEROL SULFATE 2.5 MG/3ML
2.5 SOLUTION RESPIRATORY (INHALATION) EVERY 6 HOURS PRN
Status: DISCONTINUED | OUTPATIENT
Start: 2020-11-04 | End: 2020-11-05 | Stop reason: HOSPADM

## 2020-11-04 RX ORDER — NALOXONE HCL 0.4 MG/ML
0.2 VIAL (ML) INJECTION AS NEEDED
Status: DISCONTINUED | OUTPATIENT
Start: 2020-11-04 | End: 2020-11-04 | Stop reason: HOSPADM

## 2020-11-04 RX ORDER — SODIUM CHLORIDE 0.9 % (FLUSH) 0.9 %
3-10 SYRINGE (ML) INJECTION AS NEEDED
Status: DISCONTINUED | OUTPATIENT
Start: 2020-11-04 | End: 2020-11-04 | Stop reason: HOSPADM

## 2020-11-04 RX ORDER — METOCLOPRAMIDE HYDROCHLORIDE 5 MG/ML
10 INJECTION INTRAMUSCULAR; INTRAVENOUS EVERY 6 HOURS
Status: DISCONTINUED | OUTPATIENT
Start: 2020-11-04 | End: 2020-11-05 | Stop reason: HOSPADM

## 2020-11-04 RX ORDER — SODIUM CHLORIDE 0.9 % (FLUSH) 0.9 %
3 SYRINGE (ML) INJECTION EVERY 12 HOURS SCHEDULED
Status: DISCONTINUED | OUTPATIENT
Start: 2020-11-04 | End: 2020-11-05 | Stop reason: HOSPADM

## 2020-11-04 RX ORDER — METOPROLOL SUCCINATE 25 MG/1
25 TABLET, EXTENDED RELEASE ORAL DAILY
Status: DISCONTINUED | OUTPATIENT
Start: 2020-11-04 | End: 2020-11-05 | Stop reason: HOSPADM

## 2020-11-04 RX ORDER — HYDROCODONE BITARTRATE AND ACETAMINOPHEN 7.5; 325 MG/1; MG/1
1 TABLET ORAL ONCE AS NEEDED
Status: DISCONTINUED | OUTPATIENT
Start: 2020-11-04 | End: 2020-11-04 | Stop reason: HOSPADM

## 2020-11-04 RX ORDER — CYANOCOBALAMIN 1000 UG/ML
1000 INJECTION, SOLUTION INTRAMUSCULAR; SUBCUTANEOUS ONCE
Status: COMPLETED | OUTPATIENT
Start: 2020-11-05 | End: 2020-11-05

## 2020-11-04 RX ORDER — ONDANSETRON 4 MG/1
4 TABLET, ORALLY DISINTEGRATING ORAL EVERY 4 HOURS PRN
Status: DISCONTINUED | OUTPATIENT
Start: 2020-11-04 | End: 2020-11-05 | Stop reason: HOSPADM

## 2020-11-04 RX ORDER — ONDANSETRON 2 MG/ML
4 INJECTION INTRAMUSCULAR; INTRAVENOUS ONCE AS NEEDED
Status: COMPLETED | OUTPATIENT
Start: 2020-11-04 | End: 2020-11-04

## 2020-11-04 RX ORDER — DIPHENHYDRAMINE HYDROCHLORIDE 50 MG/ML
25 INJECTION INTRAMUSCULAR; INTRAVENOUS EVERY 4 HOURS PRN
Status: DISCONTINUED | OUTPATIENT
Start: 2020-11-04 | End: 2020-11-05 | Stop reason: HOSPADM

## 2020-11-04 RX ORDER — HYDROMORPHONE HYDROCHLORIDE 1 MG/ML
0.5 INJECTION, SOLUTION INTRAMUSCULAR; INTRAVENOUS; SUBCUTANEOUS
Status: DISCONTINUED | OUTPATIENT
Start: 2020-11-04 | End: 2020-11-04 | Stop reason: HOSPADM

## 2020-11-04 RX ORDER — GABAPENTIN 300 MG/1
300 CAPSULE ORAL EVERY 8 HOURS SCHEDULED
Status: DISCONTINUED | OUTPATIENT
Start: 2020-11-04 | End: 2020-11-05 | Stop reason: HOSPADM

## 2020-11-04 RX ORDER — ONDANSETRON 2 MG/ML
4 INJECTION INTRAMUSCULAR; INTRAVENOUS EVERY 4 HOURS PRN
Status: DISCONTINUED | OUTPATIENT
Start: 2020-11-04 | End: 2020-11-05 | Stop reason: HOSPADM

## 2020-11-04 RX ORDER — LABETALOL HYDROCHLORIDE 5 MG/ML
5 INJECTION, SOLUTION INTRAVENOUS
Status: DISCONTINUED | OUTPATIENT
Start: 2020-11-04 | End: 2020-11-04 | Stop reason: HOSPADM

## 2020-11-04 RX ORDER — LORAZEPAM 1 MG/1
1 TABLET ORAL EVERY 12 HOURS PRN
Status: DISCONTINUED | OUTPATIENT
Start: 2020-11-04 | End: 2020-11-05 | Stop reason: HOSPADM

## 2020-11-04 RX ORDER — METOCLOPRAMIDE HYDROCHLORIDE 5 MG/ML
10 INJECTION INTRAMUSCULAR; INTRAVENOUS ONCE
Status: COMPLETED | OUTPATIENT
Start: 2020-11-04 | End: 2020-11-04

## 2020-11-04 RX ORDER — HYDROMORPHONE HCL 110MG/55ML
PATIENT CONTROLLED ANALGESIA SYRINGE INTRAVENOUS AS NEEDED
Status: DISCONTINUED | OUTPATIENT
Start: 2020-11-04 | End: 2020-11-04 | Stop reason: SURG

## 2020-11-04 RX ORDER — FLUMAZENIL 0.1 MG/ML
0.2 INJECTION INTRAVENOUS AS NEEDED
Status: DISCONTINUED | OUTPATIENT
Start: 2020-11-04 | End: 2020-11-04 | Stop reason: HOSPADM

## 2020-11-04 RX ORDER — MAGNESIUM HYDROXIDE 1200 MG/15ML
LIQUID ORAL AS NEEDED
Status: DISCONTINUED | OUTPATIENT
Start: 2020-11-04 | End: 2020-11-04 | Stop reason: HOSPADM

## 2020-11-04 RX ORDER — DIPHENHYDRAMINE HYDROCHLORIDE 50 MG/ML
12.5 INJECTION INTRAMUSCULAR; INTRAVENOUS
Status: DISCONTINUED | OUTPATIENT
Start: 2020-11-04 | End: 2020-11-04 | Stop reason: HOSPADM

## 2020-11-04 RX ORDER — LEVOTHYROXINE SODIUM 0.07 MG/1
75 TABLET ORAL
Status: DISCONTINUED | OUTPATIENT
Start: 2020-11-05 | End: 2020-11-05 | Stop reason: HOSPADM

## 2020-11-04 RX ORDER — SODIUM CHLORIDE, SODIUM LACTATE, POTASSIUM CHLORIDE, CALCIUM CHLORIDE 600; 310; 30; 20 MG/100ML; MG/100ML; MG/100ML; MG/100ML
100 INJECTION, SOLUTION INTRAVENOUS CONTINUOUS
Status: DISCONTINUED | OUTPATIENT
Start: 2020-11-04 | End: 2020-11-04 | Stop reason: HOSPADM

## 2020-11-04 RX ORDER — EPHEDRINE SULFATE 50 MG/ML
5 INJECTION, SOLUTION INTRAVENOUS ONCE AS NEEDED
Status: DISCONTINUED | OUTPATIENT
Start: 2020-11-04 | End: 2020-11-04 | Stop reason: HOSPADM

## 2020-11-04 RX ORDER — NALOXONE HCL 0.4 MG/ML
0.4 VIAL (ML) INJECTION
Status: DISCONTINUED | OUTPATIENT
Start: 2020-11-04 | End: 2020-11-05 | Stop reason: HOSPADM

## 2020-11-04 RX ORDER — ACETAMINOPHEN 500 MG
1000 TABLET ORAL EVERY 6 HOURS
Status: DISCONTINUED | OUTPATIENT
Start: 2020-11-04 | End: 2020-11-05 | Stop reason: HOSPADM

## 2020-11-04 RX ORDER — PROMETHAZINE HYDROCHLORIDE 25 MG/1
25 SUPPOSITORY RECTAL ONCE AS NEEDED
Status: COMPLETED | OUTPATIENT
Start: 2020-11-04 | End: 2020-11-04

## 2020-11-04 RX ORDER — HALOPERIDOL 5 MG/ML
0.5 INJECTION INTRAMUSCULAR EVERY 4 HOURS PRN
Status: DISCONTINUED | OUTPATIENT
Start: 2020-11-04 | End: 2020-11-05 | Stop reason: HOSPADM

## 2020-11-04 RX ORDER — MORPHINE SULFATE 2 MG/ML
2 INJECTION, SOLUTION INTRAMUSCULAR; INTRAVENOUS
Status: DISCONTINUED | OUTPATIENT
Start: 2020-11-04 | End: 2020-11-05 | Stop reason: HOSPADM

## 2020-11-04 RX ORDER — SODIUM CHLORIDE, SODIUM LACTATE, POTASSIUM CHLORIDE, CALCIUM CHLORIDE 600; 310; 30; 20 MG/100ML; MG/100ML; MG/100ML; MG/100ML
150 INJECTION, SOLUTION INTRAVENOUS CONTINUOUS
Status: DISCONTINUED | OUTPATIENT
Start: 2020-11-04 | End: 2020-11-05 | Stop reason: HOSPADM

## 2020-11-04 RX ORDER — HYDROMORPHONE HYDROCHLORIDE 1 MG/ML
0.5 INJECTION, SOLUTION INTRAMUSCULAR; INTRAVENOUS; SUBCUTANEOUS
Status: DISCONTINUED | OUTPATIENT
Start: 2020-11-04 | End: 2020-11-05 | Stop reason: HOSPADM

## 2020-11-04 RX ORDER — LIDOCAINE HYDROCHLORIDE 20 MG/ML
INJECTION, SOLUTION INFILTRATION; PERINEURAL AS NEEDED
Status: DISCONTINUED | OUTPATIENT
Start: 2020-11-04 | End: 2020-11-04 | Stop reason: SURG

## 2020-11-04 RX ORDER — MIDAZOLAM HYDROCHLORIDE 1 MG/ML
1 INJECTION INTRAMUSCULAR; INTRAVENOUS
Status: DISCONTINUED | OUTPATIENT
Start: 2020-11-04 | End: 2020-11-04 | Stop reason: HOSPADM

## 2020-11-04 RX ORDER — SODIUM CHLORIDE, SODIUM LACTATE, POTASSIUM CHLORIDE, CALCIUM CHLORIDE 600; 310; 30; 20 MG/100ML; MG/100ML; MG/100ML; MG/100ML
9 INJECTION, SOLUTION INTRAVENOUS CONTINUOUS
Status: DISCONTINUED | OUTPATIENT
Start: 2020-11-04 | End: 2020-11-04 | Stop reason: HOSPADM

## 2020-11-04 RX ORDER — PANTOPRAZOLE SODIUM 40 MG/10ML
40 INJECTION, POWDER, LYOPHILIZED, FOR SOLUTION INTRAVENOUS ONCE
Status: COMPLETED | OUTPATIENT
Start: 2020-11-04 | End: 2020-11-04

## 2020-11-04 RX ORDER — NITROGLYCERIN 0.4 MG/1
0.4 TABLET SUBLINGUAL
Status: DISCONTINUED | OUTPATIENT
Start: 2020-11-04 | End: 2020-11-05 | Stop reason: HOSPADM

## 2020-11-04 RX ORDER — MIRTAZAPINE 15 MG/1
15 TABLET, ORALLY DISINTEGRATING ORAL NIGHTLY
Status: DISCONTINUED | OUTPATIENT
Start: 2020-11-04 | End: 2020-11-05 | Stop reason: HOSPADM

## 2020-11-04 RX ORDER — CHLORHEXIDINE GLUCONATE 0.12 MG/ML
15 RINSE ORAL SEE ADMIN INSTRUCTIONS
Status: COMPLETED | OUTPATIENT
Start: 2020-11-04 | End: 2020-11-04

## 2020-11-04 RX ORDER — HYDROMORPHONE HYDROCHLORIDE 2 MG/1
2 TABLET ORAL EVERY 4 HOURS PRN
Status: DISCONTINUED | OUTPATIENT
Start: 2020-11-04 | End: 2020-11-05 | Stop reason: HOSPADM

## 2020-11-04 RX ORDER — LABETALOL HYDROCHLORIDE 5 MG/ML
10 INJECTION, SOLUTION INTRAVENOUS
Status: DISCONTINUED | OUTPATIENT
Start: 2020-11-04 | End: 2020-11-05 | Stop reason: HOSPADM

## 2020-11-04 RX ORDER — PROPOFOL 10 MG/ML
VIAL (ML) INTRAVENOUS AS NEEDED
Status: DISCONTINUED | OUTPATIENT
Start: 2020-11-04 | End: 2020-11-04 | Stop reason: SURG

## 2020-11-04 RX ORDER — ALBUTEROL SULFATE 2.5 MG/3ML
2.5 SOLUTION RESPIRATORY (INHALATION)
Status: DISCONTINUED | OUTPATIENT
Start: 2020-11-04 | End: 2020-11-04

## 2020-11-04 RX ORDER — DIPHENHYDRAMINE HCL 25 MG
25 CAPSULE ORAL
Status: DISCONTINUED | OUTPATIENT
Start: 2020-11-04 | End: 2020-11-04 | Stop reason: HOSPADM

## 2020-11-04 RX ORDER — FAMOTIDINE 10 MG/ML
20 INJECTION, SOLUTION INTRAVENOUS EVERY 12 HOURS SCHEDULED
Status: DISCONTINUED | OUTPATIENT
Start: 2020-11-04 | End: 2020-11-05 | Stop reason: HOSPADM

## 2020-11-04 RX ORDER — ROCURONIUM BROMIDE 10 MG/ML
INJECTION, SOLUTION INTRAVENOUS AS NEEDED
Status: DISCONTINUED | OUTPATIENT
Start: 2020-11-04 | End: 2020-11-04 | Stop reason: SURG

## 2020-11-04 RX ORDER — LORAZEPAM 2 MG/ML
1 INJECTION INTRAMUSCULAR EVERY 12 HOURS PRN
Status: DISCONTINUED | OUTPATIENT
Start: 2020-11-04 | End: 2020-11-05 | Stop reason: HOSPADM

## 2020-11-04 RX ORDER — BUPIVACAINE HYDROCHLORIDE AND EPINEPHRINE 5; 5 MG/ML; UG/ML
INJECTION, SOLUTION PERINEURAL AS NEEDED
Status: DISCONTINUED | OUTPATIENT
Start: 2020-11-04 | End: 2020-11-04 | Stop reason: HOSPADM

## 2020-11-04 RX ORDER — OXYCODONE AND ACETAMINOPHEN 7.5; 325 MG/1; MG/1
1 TABLET ORAL ONCE AS NEEDED
Status: DISCONTINUED | OUTPATIENT
Start: 2020-11-04 | End: 2020-11-04 | Stop reason: HOSPADM

## 2020-11-04 RX ORDER — SODIUM CHLORIDE 0.9 % (FLUSH) 0.9 %
3 SYRINGE (ML) INJECTION EVERY 12 HOURS SCHEDULED
Status: DISCONTINUED | OUTPATIENT
Start: 2020-11-04 | End: 2020-11-04 | Stop reason: HOSPADM

## 2020-11-04 RX ORDER — FENTANYL CITRATE 50 UG/ML
50 INJECTION, SOLUTION INTRAMUSCULAR; INTRAVENOUS
Status: DISCONTINUED | OUTPATIENT
Start: 2020-11-04 | End: 2020-11-04 | Stop reason: HOSPADM

## 2020-11-04 RX ORDER — ONDANSETRON 4 MG/1
4 TABLET, FILM COATED ORAL EVERY 4 HOURS PRN
Status: DISCONTINUED | OUTPATIENT
Start: 2020-11-04 | End: 2020-11-05 | Stop reason: HOSPADM

## 2020-11-04 RX ORDER — PROMETHAZINE HYDROCHLORIDE 25 MG/1
25 TABLET ORAL ONCE AS NEEDED
Status: COMPLETED | OUTPATIENT
Start: 2020-11-04 | End: 2020-11-04

## 2020-11-04 RX ORDER — NALOXONE HCL 0.4 MG/ML
0.1 VIAL (ML) INJECTION
Status: DISCONTINUED | OUTPATIENT
Start: 2020-11-04 | End: 2020-11-05 | Stop reason: HOSPADM

## 2020-11-04 RX ORDER — ACETAMINOPHEN 160 MG/5ML
975 SOLUTION ORAL ONCE
Status: COMPLETED | OUTPATIENT
Start: 2020-11-04 | End: 2020-11-04

## 2020-11-04 RX ORDER — LIDOCAINE HYDROCHLORIDE 10 MG/ML
0.5 INJECTION, SOLUTION EPIDURAL; INFILTRATION; INTRACAUDAL; PERINEURAL ONCE AS NEEDED
Status: DISCONTINUED | OUTPATIENT
Start: 2020-11-04 | End: 2020-11-04 | Stop reason: HOSPADM

## 2020-11-04 RX ORDER — SCOLOPAMINE TRANSDERMAL SYSTEM 1 MG/1
1 PATCH, EXTENDED RELEASE TRANSDERMAL CONTINUOUS
Status: DISCONTINUED | OUTPATIENT
Start: 2020-11-04 | End: 2020-11-05 | Stop reason: HOSPADM

## 2020-11-04 RX ORDER — DEXAMETHASONE SODIUM PHOSPHATE 10 MG/ML
INJECTION INTRAMUSCULAR; INTRAVENOUS AS NEEDED
Status: DISCONTINUED | OUTPATIENT
Start: 2020-11-04 | End: 2020-11-04 | Stop reason: SURG

## 2020-11-04 RX ADMIN — PANTOPRAZOLE SODIUM 40 MG: 40 INJECTION, POWDER, FOR SOLUTION INTRAVENOUS at 09:10

## 2020-11-04 RX ADMIN — FENTANYL CITRATE 50 MCG: 50 INJECTION, SOLUTION INTRAMUSCULAR; INTRAVENOUS at 12:45

## 2020-11-04 RX ADMIN — FENTANYL CITRATE 50 MCG: 50 INJECTION INTRAMUSCULAR; INTRAVENOUS at 11:27

## 2020-11-04 RX ADMIN — LIDOCAINE HYDROCHLORIDE 100 MG: 20 INJECTION, SOLUTION INFILTRATION; PERINEURAL at 11:28

## 2020-11-04 RX ADMIN — GABAPENTIN 300 MG: 300 CAPSULE ORAL at 21:56

## 2020-11-04 RX ADMIN — MIRTAZAPINE 15 MG: 15 TABLET, ORALLY DISINTEGRATING ORAL at 21:56

## 2020-11-04 RX ADMIN — HYDROMORPHONE HYDROCHLORIDE 2 MG: 2 TABLET ORAL at 17:15

## 2020-11-04 RX ADMIN — ONDANSETRON HYDROCHLORIDE 4 MG: 2 SOLUTION INTRAMUSCULAR; INTRAVENOUS at 11:35

## 2020-11-04 RX ADMIN — AZTREONAM 2 G: 2 INJECTION, POWDER, FOR SOLUTION INTRAMUSCULAR; INTRAVENOUS at 11:35

## 2020-11-04 RX ADMIN — METOCLOPRAMIDE HYDROCHLORIDE 10 MG: 5 INJECTION INTRAMUSCULAR; INTRAVENOUS at 21:56

## 2020-11-04 RX ADMIN — SUGAMMADEX 150 MG: 100 INJECTION, SOLUTION INTRAVENOUS at 12:23

## 2020-11-04 RX ADMIN — FENTANYL CITRATE 50 MCG: 50 INJECTION INTRAMUSCULAR; INTRAVENOUS at 11:38

## 2020-11-04 RX ADMIN — FAMOTIDINE 20 MG: 10 INJECTION INTRAVENOUS at 21:56

## 2020-11-04 RX ADMIN — SODIUM CHLORIDE, PRESERVATIVE FREE 3 ML: 5 INJECTION INTRAVENOUS at 21:57

## 2020-11-04 RX ADMIN — HYDROMORPHONE HYDROCHLORIDE 0.5 MG: 2 INJECTION, SOLUTION INTRAMUSCULAR; INTRAVENOUS; SUBCUTANEOUS at 11:57

## 2020-11-04 RX ADMIN — FENTANYL CITRATE 50 MCG: 50 INJECTION, SOLUTION INTRAMUSCULAR; INTRAVENOUS at 13:00

## 2020-11-04 RX ADMIN — ONDANSETRON 4 MG: 2 INJECTION INTRAMUSCULAR; INTRAVENOUS at 12:43

## 2020-11-04 RX ADMIN — PROPOFOL 25 MCG/KG/MIN: 10 INJECTION, EMULSION INTRAVENOUS at 11:35

## 2020-11-04 RX ADMIN — PROMETHAZINE HYDROCHLORIDE 25 MG: 25 TABLET ORAL at 17:14

## 2020-11-04 RX ADMIN — ENOXAPARIN SODIUM 40 MG: 40 INJECTION SUBCUTANEOUS at 11:06

## 2020-11-04 RX ADMIN — METOCLOPRAMIDE HYDROCHLORIDE 10 MG: 5 INJECTION INTRAMUSCULAR; INTRAVENOUS at 17:13

## 2020-11-04 RX ADMIN — METOCLOPRAMIDE HYDROCHLORIDE 10 MG: 5 INJECTION INTRAMUSCULAR; INTRAVENOUS at 09:11

## 2020-11-04 RX ADMIN — CHLORHEXIDINE GLUCONATE 15 ML: 1.2 RINSE ORAL at 09:11

## 2020-11-04 RX ADMIN — ROCURONIUM BROMIDE 50 MG: 10 INJECTION, SOLUTION INTRAVENOUS at 11:28

## 2020-11-04 RX ADMIN — SODIUM CHLORIDE, POTASSIUM CHLORIDE, SODIUM LACTATE AND CALCIUM CHLORIDE: 600; 310; 30; 20 INJECTION, SOLUTION INTRAVENOUS at 11:20

## 2020-11-04 RX ADMIN — HYDROMORPHONE HYDROCHLORIDE 0.5 MG: 2 INJECTION, SOLUTION INTRAMUSCULAR; INTRAVENOUS; SUBCUTANEOUS at 12:24

## 2020-11-04 RX ADMIN — HYDROMORPHONE HYDROCHLORIDE 0.5 MG: 1 INJECTION, SOLUTION INTRAMUSCULAR; INTRAVENOUS; SUBCUTANEOUS at 16:45

## 2020-11-04 RX ADMIN — VANCOMYCIN HYDROCHLORIDE 2000 MG: 10 INJECTION, POWDER, LYOPHILIZED, FOR SOLUTION INTRAVENOUS at 09:17

## 2020-11-04 RX ADMIN — HYDROMORPHONE HYDROCHLORIDE 0.5 MG: 1 INJECTION, SOLUTION INTRAMUSCULAR; INTRAVENOUS; SUBCUTANEOUS at 12:50

## 2020-11-04 RX ADMIN — ACETAMINOPHEN 1000 MG: 500 TABLET, FILM COATED ORAL at 21:55

## 2020-11-04 RX ADMIN — HYDROMORPHONE HYDROCHLORIDE 0.5 MG: 1 INJECTION, SOLUTION INTRAMUSCULAR; INTRAVENOUS; SUBCUTANEOUS at 13:15

## 2020-11-04 RX ADMIN — SCOPALAMINE 1 PATCH: 1 PATCH, EXTENDED RELEASE TRANSDERMAL at 09:10

## 2020-11-04 RX ADMIN — HYOSCYAMINE SULFATE 125 MCG: 0.12 TABLET, ORALLY DISINTEGRATING ORAL at 17:12

## 2020-11-04 RX ADMIN — ACETAMINOPHEN ORAL SOLUTION 975 MG: 325 SOLUTION ORAL at 09:11

## 2020-11-04 RX ADMIN — SODIUM CHLORIDE, POTASSIUM CHLORIDE, SODIUM LACTATE AND CALCIUM CHLORIDE 500 ML: 600; 310; 30; 20 INJECTION, SOLUTION INTRAVENOUS at 09:10

## 2020-11-04 RX ADMIN — MIDAZOLAM 1 MG: 1 INJECTION INTRAMUSCULAR; INTRAVENOUS at 11:06

## 2020-11-04 RX ADMIN — SODIUM CHLORIDE, POTASSIUM CHLORIDE, SODIUM LACTATE AND CALCIUM CHLORIDE 150 ML/HR: 600; 310; 30; 20 INJECTION, SOLUTION INTRAVENOUS at 18:47

## 2020-11-04 RX ADMIN — DEXAMETHASONE SODIUM PHOSPHATE 10 MG: 10 INJECTION INTRAMUSCULAR; INTRAVENOUS at 11:35

## 2020-11-04 RX ADMIN — PROPOFOL 200 MG: 10 INJECTION, EMULSION INTRAVENOUS at 11:28

## 2020-11-04 NOTE — OP NOTE
PREOPERATIVE DIAGNOSIS:  Morbid obesity with multiple comorbidities as referenced in the most recent history and physical.    POSTOPERATIVE DIAGNOSIS:    1:  Morbid obesity with multiple comorbidities as referenced in the most recent history and physical.  2:  Paraesophageal Hernia    PROCEDURES PERFORMED:  1.  Laparoscopic sleeve gastrectomy.  2.  Laparoscopic paraesophageal hernia repair.  3.  Tisseel application.     SURGEON:  Marc Ramirez Jr., MD    ASSISTANT: Sarah Palmer MD      Surgery assisted and facilitated by a certified physician assistant, who directly resulted in a decreased operative time, anesthetic time, wound exposure, and possibly of an operative wound infection, thereby decreasing patient morbidity and ultimately total expenditures. The surgical assistant assisted in placement of trochars, take down of the gastrocolic omentum, short gastric vessels and dissection at the angle of His.  Also assisted in retraction of the stomach during stapling so as not to kink the gastric sleeve.  Also assisted in removing of the gastric specimen, closure of the fascial defect as well as closure of the skin incisions. They also assisted in retraction of the stomach during the hiatal hernia repair, dissection of the hernia sac and closure of the crura.      ANESTHESIA:  General endotracheal.    ESTIMATED BLOOD LOSS:   Less than 25 mL unless dictated below.    FLUIDS:  Crystalloids.    SPECIMENS:  Gastric remnant    DRAINS:  None.    COUNTS:  Correct.    COMPLICATIONS:  None.    INDICATIONS:  This patient with morbid obesity and associated comorbidities presents for elective laparoscopic, possible open sleeve gastrectomy.  The patient has received medical clearance to proceed.  The patient has undergone our extensive educational process and consent process and wishes to proceed.    DESCRIPTION OF PROCEDURE:  The patient was brought to the operating room and placed supine upon the operating room table. SCD hose  were placed.  The patient underwent uneventful general endotracheal anesthesia per the anesthesiology staff. The abdomen was prepped with ChloraPrep and draped in the usual sterile fashion.  An Ioban was used as well if not allergic.  Anesthesia staff either passed a 18 Iranian gastric tube into the stomach to decompress.  A 5-10 mm transverse incision was made a few centimeters above and to the left of the umbilicus and the peritoneal cavity entered under direct camera visualization using a 5 or 10 mm 0° laparoscope and an Optiview trocar.  The abdomen was then insufflated to a pressure of 15-16 mmHg with CO2 gas.  Exploratory laparoscopy revealed no evidence of injury from the entrance technique and no significant abnormalities unless addendum dictated below.  An angled laparoscope was then used.  The patient was placed in reverse Trendelenburg position.  Under direct camera visualization, a 5 or 12 mm trocar was placed in the right lateral subcostal position.  A 12 mm trocar was placed in the right midabdominal position.  A 5-12 mm trocar was placed in the left midabdominal position. A Myriam retractor was placed through an epigastric incision and used to elevate the left lobe of the liver.  The fat pad was elevated and the left parisa exposed.  At this point approximately MCC along the greater curvature, the gastrocolic omentum was divided with the Enseal and this proceeded superiorly to the angle of His taking down the short gastric vessels.  All posterior attachments of the lesser sac and posterior aspect of the stomach to the pancreas were taken down as well.  The left parisa was exposed along its length.  Dissection then proceeded medially taking down the greater curvature with an Enseal until just proximal to the pylorus.  The standard clamp and 18 Iranian orogastric tube were used to size the gastric sleeve. The stomach was marked in the 3 positions using the indelible ink pen.  The 3 markings were at the  angle of Hiss, the incisura and antrum using 1cm, 3cm and 5cm respectively. Green cartridges were used for a total of 4-5. The 1st load on the [place] Flex stapler with Veritas Sangeeta-Strip and this was placed 5 cm proximal to the pylorus.  The next several loads with absorbable Veritas Sangeeta-Strips depending on the size of the stomach. Careful attention was made to stay 1 cm from the esophagus.  Areas of the reinforced staple line were oversewn with absorbable sutures as needed for bleeding or questionable staple lines. At this point, the gastrectomy specimen was withdrawn through the 12 mm trocar site incision. The specimen staple line was inspected and was intact.  The specimen was then sent off to pathology.   At this point, the sleeve was submerged under saline and using the orogastric tube to insufflate the stomach, a leak test was performed.  This revealed the sleeve to be watertight, no air bubbles, no leak, and no bleeding seen from the staple lines and no significant abnormalities.  Irrigation fluid from the abdomen was then suctioned free.  The gastric sleeve staple line was then treated with  Tisseel fibrin glue. The fascia at the 12 mm trocar site incision was closed with a single 0 Vicryl suture in a figure-of-eight fashion placed under direct laparoscopic camera visualization with a suture passer and tying the knot extracorporeally.  The fascia in the area was infiltrated with local anesthesia. All incisions were then infiltrated with local anesthetic. The remaining trocars were removed under direct camera visualization with no bleeding noted from their sites.  The abdomen was desufflated of gas. The skin in each incision was closed using 4-0 antibiotic impregnated Monocryl in a subcuticular stitch followed by Dermabond.  The patient tolerated the procedure well without complication and was taken to the recovery room in stable condition.  All sponge, needle, and instrument counts were correct.    The  patient was noted to have a paraesophageal hernia.  The phrenoesophageal membrane was opened up with electrocautery and the right and left crura were cleaned off using sharp and blunt dissection.  The peritoneum overlying the right parisa was incised and the plane along the hernia sac was developed.  The dissection then extended anteriorly and laterally to the left parisa.  The base of the crural confluence was dissected free of adhesions to the hernia sac.  The hernia sac was carefully dissected into the mediastinum with caudal traction.  The interfaces between the pleura, pericardium, spine, and aorta were developed as a dissection was carried cephalically to the top of the hernia sac.  Sac contents were completely reduced back into the abdominal cavity.  Careful attention was made not to injure the vagus nerve.  The esophagus was identified and dissected circumferentially and along its previous stomach course in order to reduce tension, allowing the gastroesophageal junction to rest comfortably within the abdominal cavity.  The gastrosplenic ligament and the short gastric vessels were divided with the Enseal device.  The retroesophageal window was developed and the esophagus was retracted caudally.  The crural pillars were then approximated with 0 Ethibond sutures.  Anterior reinforcement was performed as well if needed.

## 2020-11-04 NOTE — ANESTHESIA PROCEDURE NOTES
Airway  Urgency: elective    Date/Time: 11/4/2020 11:32 AM  Airway not difficult    General Information and Staff    Patient location during procedure: OR  Anesthesiologist: Guanaco Corrales MD  CRNA: Kelley Patel CRNA    Indications and Patient Condition  Indications for airway management: airway protection    Preoxygenated: yes  Mask difficulty assessment: 2 - vent by mask + OA or adjuvant +/- NMBA    Final Airway Details  Final airway type: endotracheal airway      Successful airway: ETT  Cuffed: yes   Successful intubation technique: direct laryngoscopy  Facilitating devices/methods: cricoid pressure  Endotracheal tube insertion site: oral  Blade: Renetta  Blade size: 3  ETT size (mm): 7.0  Cormack-Lehane Classification: grade I - full view of glottis  Placement verified by: chest auscultation and capnometry   Cuff volume (mL): 7  Measured from: lips  ETT/EBT  to lips (cm): 20  Number of attempts at approach: 1  Assessment: lips, teeth, and gum same as pre-op and atraumatic intubation    Additional Comments  Atraumatic placement of ETT, dentition unchanged from preop.

## 2020-11-04 NOTE — ANESTHESIA POSTPROCEDURE EVALUATION
"Patient: Valentina Anderson    Procedure Summary     Date: 11/04/20 Room / Location:  [facility]  [facility]    Anesthesia Start: 1120 Anesthesia Stop: 1237    Procedure: GASTRIC SLEEVE LAPAROSCOPIC With PARAESOPHAGEAL Hernia Repair (N/A Abdomen) Diagnosis:       Obesity, Class III, BMI 40-49.9 (morbid obesity) (CMS/HCC)      Hiatal hernia      (Obesity, Class III, BMI 40-49.9 (morbid obesity) (CMS/HCC) [E66.01])      (Hiatal hernia [K44.9])    Surgeon: Marc Ramirez Jr., MD Provider: Guanaco Corrales MD    Anesthesia Type: general ASA Status: 3          Anesthesia Type: general    Vitals  Vitals Value Taken Time   /86 11/04/20 1430   Temp 36.7 °C (98 °F) 11/04/20 1235   Pulse 74 11/04/20 1445   Resp 18 11/04/20 1345   SpO2 95 % 11/04/20 1445   Vitals shown include unvalidated device data.        Post Anesthesia Care and Evaluation    Patient location during evaluation: bedside  Patient participation: complete - patient participated  Level of consciousness: awake  Pain management: adequate  Airway patency: patent  Anesthetic complications: No anesthetic complications    Cardiovascular status: acceptable  Respiratory status: acceptable  Hydration status: acceptable    Comments: */88 (BP Location: Right arm, Patient Position: Lying)   Pulse 72   Temp 36.7 °C (98 °F) (Oral)   Resp 18   Ht 171.3 cm (67.44\")   Wt 134 kg (296 lb)   LMP 10/23/2020   SpO2 97%   BMI 45.76 kg/m²         "

## 2020-11-04 NOTE — ANESTHESIA PREPROCEDURE EVALUATION
Anesthesia Evaluation     Patient summary reviewed and Nursing notes reviewed   no history of anesthetic complications:  NPO Solid Status: > 8 hours  NPO Liquid Status: > 2 hours           Airway   Mallampati: II  TM distance: >3 FB  Neck ROM: full  no difficulty expected  Dental - normal exam     Pulmonary - normal exam   (+) a smoker Former,   (-) COPD, asthma, lung cancer  Cardiovascular - normal exam  Exercise tolerance: good (4-7 METS)    ECG reviewed  Patient on routine beta blocker and Beta blocker given within 24 hours of surgery  Rhythm: regular  Rate: normal    (+) dysrhythmias PVC,   (-) hypertension, valvular problems/murmurs, past MI, CAD, angina, CHF, cardiac stents, CABG, pericardial effusion      Neuro/Psych  (+) headaches, dizziness/light headedness, psychiatric history Anxiety and Depression,     (-) seizures, TIA, CVA  GI/Hepatic/Renal/Endo    (+) morbid obesity, hiatal hernia, GERD well controlled,  thyroid problem hypothyroidism  (-) PUD, hepatitis, liver disease, no renal disease, diabetes, GI bleed    Musculoskeletal     (+) neck pain,   Abdominal   (+) obese,     Abdomen: soft.   Substance History - negative use     OB/GYN negative ob/gyn ROS         Other   arthritis,                      Anesthesia Plan    ASA 3     general     intravenous induction     Anesthetic plan, all risks, benefits, and alternatives have been provided, discussed and informed consent has been obtained with: patient.    Plan discussed with CRNA and attending.

## 2020-11-05 VITALS
RESPIRATION RATE: 18 BRPM | DIASTOLIC BLOOD PRESSURE: 72 MMHG | BODY MASS INDEX: 45.99 KG/M2 | HEART RATE: 58 BPM | TEMPERATURE: 98.4 F | SYSTOLIC BLOOD PRESSURE: 127 MMHG | HEIGHT: 67 IN | WEIGHT: 293 LBS | OXYGEN SATURATION: 95 %

## 2020-11-05 LAB
ALBUMIN SERPL-MCNC: 3.8 G/DL (ref 3.5–5.2)
ALBUMIN/GLOB SERPL: 1.2 G/DL
ALP SERPL-CCNC: 71 U/L (ref 39–117)
ALT SERPL W P-5'-P-CCNC: 91 U/L (ref 1–33)
ANION GAP SERPL CALCULATED.3IONS-SCNC: 9.3 MMOL/L (ref 5–15)
AST SERPL-CCNC: 42 U/L (ref 1–32)
BASOPHILS # BLD AUTO: 0.02 10*3/MM3 (ref 0–0.2)
BASOPHILS NFR BLD AUTO: 0.1 % (ref 0–1.5)
BILIRUB SERPL-MCNC: 0.3 MG/DL (ref 0–1.2)
BUN SERPL-MCNC: 8 MG/DL (ref 6–20)
BUN/CREAT SERPL: 9.3 (ref 7–25)
CALCIUM SPEC-SCNC: 8.6 MG/DL (ref 8.6–10.5)
CHLORIDE SERPL-SCNC: 108 MMOL/L (ref 98–107)
CO2 SERPL-SCNC: 20.7 MMOL/L (ref 22–29)
CREAT SERPL-MCNC: 0.86 MG/DL (ref 0.57–1)
CYTO UR: NORMAL
DEPRECATED RDW RBC AUTO: 44.4 FL (ref 37–54)
EOSINOPHIL # BLD AUTO: 0 10*3/MM3 (ref 0–0.4)
EOSINOPHIL NFR BLD AUTO: 0 % (ref 0.3–6.2)
ERYTHROCYTE [DISTWIDTH] IN BLOOD BY AUTOMATED COUNT: 14.2 % (ref 12.3–15.4)
GFR SERPL CREATININE-BSD FRML MDRD: 72 ML/MIN/1.73
GLOBULIN UR ELPH-MCNC: 3.1 GM/DL
GLUCOSE SERPL-MCNC: 129 MG/DL (ref 65–99)
HCT VFR BLD AUTO: 41.8 % (ref 34–46.6)
HGB BLD-MCNC: 13.8 G/DL (ref 12–15.9)
IMM GRANULOCYTES # BLD AUTO: 0.1 10*3/MM3 (ref 0–0.05)
IMM GRANULOCYTES NFR BLD AUTO: 0.5 % (ref 0–0.5)
LAB AP CASE REPORT: NORMAL
LYMPHOCYTES # BLD AUTO: 1.51 10*3/MM3 (ref 0.7–3.1)
LYMPHOCYTES NFR BLD AUTO: 8.2 % (ref 19.6–45.3)
MAGNESIUM SERPL-MCNC: 2.4 MG/DL (ref 1.6–2.6)
MCH RBC QN AUTO: 28.3 PG (ref 26.6–33)
MCHC RBC AUTO-ENTMCNC: 33 G/DL (ref 31.5–35.7)
MCV RBC AUTO: 85.8 FL (ref 79–97)
MONOCYTES # BLD AUTO: 0.8 10*3/MM3 (ref 0.1–0.9)
MONOCYTES NFR BLD AUTO: 4.3 % (ref 5–12)
NEUTROPHILS NFR BLD AUTO: 16.03 10*3/MM3 (ref 1.7–7)
NEUTROPHILS NFR BLD AUTO: 86.9 % (ref 42.7–76)
NRBC BLD AUTO-RTO: 0 /100 WBC (ref 0–0.2)
PATH REPORT.FINAL DX SPEC: NORMAL
PATH REPORT.GROSS SPEC: NORMAL
PHOSPHATE SERPL-MCNC: 3.2 MG/DL (ref 2.5–4.5)
PLATELET # BLD AUTO: 334 10*3/MM3 (ref 140–450)
PMV BLD AUTO: 10.3 FL (ref 6–12)
POTASSIUM SERPL-SCNC: 4.5 MMOL/L (ref 3.5–5.2)
PROT SERPL-MCNC: 6.9 G/DL (ref 6–8.5)
RBC # BLD AUTO: 4.87 10*6/MM3 (ref 3.77–5.28)
SODIUM SERPL-SCNC: 138 MMOL/L (ref 136–145)
WBC # BLD AUTO: 18.46 10*3/MM3 (ref 3.4–10.8)

## 2020-11-05 PROCEDURE — 25010000002 THIAMINE PER 100 MG: Performed by: SURGERY

## 2020-11-05 PROCEDURE — 25010000002 METOCLOPRAMIDE PER 10 MG: Performed by: SURGERY

## 2020-11-05 PROCEDURE — 80053 COMPREHEN METABOLIC PANEL: CPT | Performed by: SURGERY

## 2020-11-05 PROCEDURE — 84100 ASSAY OF PHOSPHORUS: CPT | Performed by: SURGERY

## 2020-11-05 PROCEDURE — 83735 ASSAY OF MAGNESIUM: CPT | Performed by: SURGERY

## 2020-11-05 PROCEDURE — 85025 COMPLETE CBC W/AUTO DIFF WBC: CPT | Performed by: SURGERY

## 2020-11-05 PROCEDURE — 25010000002 ENOXAPARIN PER 10 MG: Performed by: SURGERY

## 2020-11-05 PROCEDURE — 25010000002 CYANOCOBALAMIN PER 1000 MCG: Performed by: SURGERY

## 2020-11-05 RX ORDER — ONDANSETRON 4 MG/1
4 TABLET, ORALLY DISINTEGRATING ORAL EVERY 4 HOURS PRN
Qty: 14 TABLET | Refills: 0 | Status: SHIPPED | OUTPATIENT
Start: 2020-11-05 | End: 2020-12-08

## 2020-11-05 RX ORDER — HYDROMORPHONE HYDROCHLORIDE 2 MG/1
2 TABLET ORAL EVERY 4 HOURS PRN
Qty: 18 TABLET | Refills: 0 | Status: SHIPPED | OUTPATIENT
Start: 2020-11-05 | End: 2020-11-08

## 2020-11-05 RX ADMIN — GABAPENTIN 300 MG: 300 CAPSULE ORAL at 06:06

## 2020-11-05 RX ADMIN — CYANOCOBALAMIN 1000 MCG: 1000 INJECTION, SOLUTION INTRAMUSCULAR at 09:25

## 2020-11-05 RX ADMIN — METOCLOPRAMIDE HYDROCHLORIDE 10 MG: 5 INJECTION INTRAMUSCULAR; INTRAVENOUS at 05:05

## 2020-11-05 RX ADMIN — THIAMINE HYDROCHLORIDE 250 ML/HR: 100 INJECTION, SOLUTION INTRAMUSCULAR; INTRAVENOUS at 00:16

## 2020-11-05 RX ADMIN — FAMOTIDINE 20 MG: 10 INJECTION INTRAVENOUS at 09:25

## 2020-11-05 RX ADMIN — ACETAMINOPHEN 1000 MG: 500 TABLET, FILM COATED ORAL at 00:16

## 2020-11-05 RX ADMIN — ACETAMINOPHEN 1000 MG: 500 TABLET, FILM COATED ORAL at 06:06

## 2020-11-05 RX ADMIN — SODIUM CHLORIDE, POTASSIUM CHLORIDE, SODIUM LACTATE AND CALCIUM CHLORIDE 150 ML/HR: 600; 310; 30; 20 INJECTION, SOLUTION INTRAVENOUS at 00:17

## 2020-11-05 RX ADMIN — METOCLOPRAMIDE HYDROCHLORIDE 10 MG: 5 INJECTION INTRAMUSCULAR; INTRAVENOUS at 09:25

## 2020-11-05 RX ADMIN — METOPROLOL SUCCINATE 25 MG: 25 TABLET, EXTENDED RELEASE ORAL at 09:25

## 2020-11-05 RX ADMIN — LEVOTHYROXINE SODIUM 75 MCG: 75 TABLET ORAL at 06:06

## 2020-11-05 RX ADMIN — ENOXAPARIN SODIUM 40 MG: 40 INJECTION SUBCUTANEOUS at 09:24

## 2020-11-05 NOTE — PROGRESS NOTES
Case Management Discharge Note      Final Note: Home with no needs. Transport by private auto         Selected Continued Care - Admitted Since 11/4/2020     Destination    No services have been selected for the patient.              Durable Medical Equipment    No services have been selected for the patient.              Dialysis/Infusion    No services have been selected for the patient.              Home Medical Care    No services have been selected for the patient.              Therapy    No services have been selected for the patient.              Community Resources    No services have been selected for the patient.                  Transportation Services  Private: Car    Final Discharge Disposition Code: 01 - home or self-care

## 2020-11-05 NOTE — DISCHARGE INSTRUCTIONS
GOING HOME AFTER GASTRIC SLEEVE/ GASTRIC BYPASS SURGERY  [facility] Weight Loss: Post-Operative Information/Instructions  Marc Ramirez Jr., MD  General Patient Instructions for Discharge   - Call Surgeon's office at [phone] for follow-up appointment.    - Be sure you, the patient, have a follow-up appointment to be seen within seven (7) days after discharge. If not, please call [phone] to schedule an appointment. If you are discharged on a Saturday or Sunday, please call Monday to schedule the appointment.  - Contact the Surgeon at [phone] for any questions or concerns, including temperature greater than or equal to 101F, shortness of breath, leg swelling, redness at incision sites, nausea, vomiting, chills, or problems or questions.    - Follow the Gastric Stage 1 Diet    à Clear liquids, room temperature, sugar-free, caffeine-free, non-carbonated, 70 grams of protein, No Straws.  - You may shower. No tub bath for 2 weeks.  - No lifting, pushing, pulling, or tugging >25 pounds for 3 weeks.  - Ambulate every 3 hours while awake minimum for seven (7) days, increase distance daily.  - For the next several weeks, you are at an increased risk for blood clot formation. Therefore, you should walk regularly. You should not sit for prolonged periods of time, more than 45 minutes, without getting up and walking for 5-10 minutes. This includes any car rides, including the drive home from the hospital. If driving any distance greater than 30 miles over the next two (2) weeks, stop every 30-45 minutes and walk for 5-10 minutes each time.  - Continue using Incentive Spirometer and coughing exercises at least every two (2) hours while awake for one week.  - Continue use of CPAP/BIPAP for diagnosis of sleep apnea as directed.  - No driving or operating machinery allowed while taking narcotic (prescription) pain medication, and until you feel comfortable forcefully applying the brakes if needed. (This  usually takes more than 3 days.)    - Make an appointment with your Primary Care Physician within one week post-op to look at your home medications for possible changes or discontinuity.   Medications  - The nurse will provide a list of medications for you to continue at home   - If you received a Lovenox (Enoxaparin) or Apixiban (Eliquis) prescription at pre-op visit with Surgeon, start taking the medicine the morning after discharge unless directed otherwise.    - If you were prescribed Lovenox (Enoxaparin), review the education/teaching material/video with the nurse.    - Take post op pain meds as prescribed as needed.   - Continue Foltx until finished.   - Resume use of Actigall (Ursodiol) one (1) week after surgery if patient still has gallbladder. You should have been given a prescription at your pre-op visit. Contact the office if you do not have the prescription.   - Resume bariatric vitamin regimen as instructed in pre-op education with bariatric coordinator.    - Zegerid or Prilosec OTC (or generic) by mouth once daily for four (4) weeks unless you are already taking a proton pump inhibitor as home medication. Follow dosing instructions on package.   Nausea/Vomiting:  The following are possible causes for nausea/vomiting:  - Drinking too much or too fast.  - Sinus drainage/post nasal drip for allergy sufferers (you may take Sudafed, Claritin, Tylenol Sinus/Allergy, or other decongestants and nose sprays to help with this discomfort).  - Low blood sugar (sweating, shaky, irritable, weakness, dizzy or tunnel-vision) - treatment is to sip 100% fruit juice - no sugar added until symptoms subside.  - Acid in fruit juice - (may dilute with water or avoid).  - Eating or drinking something that is not on clear liquid (stage 1) diet.  Any nausea/vomiting that prohibits you from keeping fluids down for greater than 24 hours requires a call to the surgeon's office.  Urine:  Use your urine color as a guide to  determine if you are drinking enough fluid. The darker the urine, the more fluids you need to drink. Urine should be clear to light yellow if you are getting enough fluid. If you should experience frequency, burning or pain with urination, blood in urine, contact us or your primary care physician for possible UTI (urinary tract infection), which could require antibiotics (liquid preferred).  Bowel Movements:  You may not have a bowel movement for 2-5 days after going home. You may then experience liquid, runny or loose stools for approximately 3-4 weeks following surgery. This would require you to drink even more fluids to prevent dehydration. Some patients may experience constipation, which can be treated with increased fluids, drinking warm liquids, increased activity and the use of a Fleets Enema, Milk of Magnesia, or suppositories. The first couple of bowel movements could be bloody, tarry black or dark maroon in color. This is OK as long as the stool returns to a normal color in 1-2 days. If however, you have frequent or a large amount of bloody or tarry black stools and/or become light-headed or dizzy, you may be bleeding and require urgent attention. Please call us right away.  Abdominal Incisions:  You will have small incisions. Do not scrub incisions, but allow the warm, soapy water to run over the incisions, rinse well, and pat dry. You may use any brand of anti-bacterial soap. Do not use Peroxide or Neosporin type ointments on sites, unless instructed to do so by a surgeon or nurse. Monitor daily for signs/symptoms of infection, which might include: drainage with a foul odor, pain, redness, swelling or heat at the incision sight; fever, body aches and chills. If you suspect infection or have a fever, give us a call.  Pain:  You will be given a prescription for pain medication to control your pain. If you feel the dose is too strong, you may take half the ordered dose, or you may take Tylenol adult liquid  per package instructions for minor pain. Do not take any medications that contain aspirin or aspirin products.  Do not take medications like: Motrin, Aleve, Ibuprofen, Advil, Naproxen, Celebrex, Daypro, Bextra, Meloxicam or other medications commonly used for arthritis or joint pain.  No steroids or cortisone injections. There may be pain, which should improve every few days. Pain should not suddenly get worse or more intense. Pain that suddenly changes and is constant and severe should be called in to the surgeon's office. Any sudden pain in the lower extremities with associated warmth and redness should be called in to the surgeon's office immediately. Do not rub or massage this area, as it could be a blood clot.  Diet:  Remain on the clear liquid diet (stage 1) per your  which includes 70 grams of protein each day, sugar free, non carbonated and no straws. Day 1 is the day of surgery. If you are tolerating the stage 1 diet, you may then proceed to stage 2 diet, as instructed in the . Do not progress to the stage 2 diet if you are having nausea/vomiting. Refer to the Basic Nutrition and Food Principles guide.  Medications:  The nurse will let you know which medications you will need to continue once you go home. Do not take any medications that are extended or time released if you had the gastric bypass procedure, OK to take if you had the gastric sleeve procedure. Large capsules can be opened and diluted with clear liquids. Check with your physician or pharmacist as to which pills may be crushed and which capsules may be opened and diluted safely. Continue taking Foltx as surgeon orders. If you still have your gall bladder and were prescribed Actigall (Ursodiol), you may resume this medication one week after your surgery. You will remain on Actigall (Ursodiol) for approximately 6 months. The dose is 1 pill, 2 times each day for 6 months.  Activity:  Continue your deep breathing and  coughing exercises with your Incentive Spirometer breathing device at least every 3 hours while awake (10 repetitions each time) for one week. May use CPAP. This will help to prevent respiratory problems such as pneumonia. No lifting, pulling or tugging anything over 25 pounds for 3 weeks after surgery. You may shower but no tub baths, hot tubs or swimming for 2 weeks. Moderate walking is recommended every 3 hours while awake minimum, increase distance daily. Further exercise will be discussed at the first post-op visit. No driving or operating machinery allow until off narcotic pain medication and until you feel comfortable forcefully applying the brakes (usually takes 3 or more days). For the next few weeks you are at an increased risk for blood clot formation. Therefore you should walk regularly and you should not sit for prolonged periods of time, more than 45 minutes without getting up and walking for 5-10 minutes. This includes car rides. Including riding home from the hospital. If riding a distance greater than 30 miles over the next 2 weeks stop every 30-45 minutes and walk 5-10 mintues each time. No tanning bed use for 8 weeks after surgery and in general, not recommended due to the increased risk for skin cancer. Incisions will burn/blister very badly with tanning bed use.  Illness:  Your primary care physician should treat general illness such as ear infections, sinus infections, and viral type illnesses, etc. Medications prescribed should be liquid/elixir form when possible, for the first 30 days.  General:  In general, it is recommended that you weigh yourself no more than once per week. Let the weight come off you and concentrate on more important things. Remember the weight was not gained overnight, nor will it be lost overnight. Gastric Bypass/ Gastric Sleeve weight loss will continue over a period of 12-18 months. Do not  yourself according to how others are doing after surgery, as this will  cause unnecessary discouragement.  THE ABOVE ARE GENERAL GUIDELINES TO ASSIST YOU ONCE HOME, IF YOU ARE IN DOUBT, OR YOU HAVE ANY QUESTIONS, CALL US AT THE NUMBERS LISTED BELOW.  IN THE EVENT OF SUDDEN CHEST PAIN, SHORTNESS OF BREATH, OR ANY LIFE THREATENING CONDITION, CALL 911.  Any time you are evaluated or admitted to another facility, please have someone notify the surgeon's office.  Supplements:  70 grams of protein taken EVERY DAY. Remember to drink at least 64 ounces of fluid a day, sipping slowly early on. Increase this amount during the summertime. Sipping slowly will not stretch your new stomach. Drinking too fast or gulping liquids will cause brief discomfort and early could cause staple line disruption (leak). With eating, tiny bites, then chew, chew, chew, and swallow. Lay your fork/spoon down for 2-3 minutes, and then take your next bite. Your pouch will tell you within 1-2 bites if it is going to tolerate what you are eating.   Protein Vendors:  Refer to protein vendors' handout from consult class. You can always find protein drinks at the bariatric office, grocery stores, [employer], drug [employer], [employer], health food stores, and on the Internet. Find one high in protein (15-30 grams per serving) and low carb (less than 18 grams per serving).  Now is a great time to re-read your . Please review specific instructions given to you at discharge by your physician (surgeon).  HOW/WHEN TO CONTACT US:  It is imperative that you contact us with any of the following:    Ÿ fever greater than 101 degrees  Ÿ shortness of breath  Ÿ leg swelling  Ÿ body aches  Ÿ shaking chills  Ÿ nausea and vomitting  Ÿ pain that has worsened  Ÿ redness at incision sites  Ÿ pus or foul smelling drainage from an incision or wound  Ÿ inability to keep fluids down for more than a day  Ÿ any other condition you feel needs our attention.  [facility] - Bariatric: [phone] call this number anytime 24 hours a  day / 7 days a week.  Teach-back Questions to be answered by the patient prior to discharge.   What complications would prompt you to call your doctor when you return home? _________________    What is the purpose of your prescribed medication? ________________  What are some potential side effects of the medications you will be taking at home? _______________

## 2020-11-05 NOTE — PLAN OF CARE
Goal Outcome Evaluation:  Plan of Care Reviewed With: patient  Progress: no change  Outcome Summary: vitals stable. ambulated x4 in hallway. pain controllable with scheudled meds. IS use.  will continue to monitor

## 2020-11-05 NOTE — DISCHARGE SUMMARY
"Discharge Summary    Patient name: Valentina Anderson    Medical record number: [id number]    Admission date: 11/4/2020  Discharge date:  11/5/2020    Attending physician: Dr. Marc Ramirez    Primary care physician: Anna Carias APRN    Referring physician: Marc Ramirez Jr., MD  [address] [address]    Condition on discharge: Stable    Primary Diagnoses:  Morbid obesity with co-morbidities    Operative Procedure: Laparoscopic sleeve gastrectomy with paraesophageal hernia repair     Valentina Anderson  is post op day one status post procedure listed. Patient denies shortness of air and lower extremity pain. Feels better than yesterday. No vomiting this am. Ambulating well and using incentive spirometer.          /72 (BP Location: Right arm, Patient Position: Sitting)   Pulse 58   Temp 98.4 °F (36.9 °C) (Oral)   Resp 18   Ht 171.3 cm (67.44\")   Wt 134 kg (296 lb)   LMP 10/23/2020   SpO2 95%   BMI 45.76 kg/m²     General:  alert, appears stated age and cooperative   Abdomen: soft, bowel sounds active, appropriate tenderness   Incision:   healing well, no drainage, no erythema, no hernia, no seroma, no swelling, no dehiscence, incision well approximated   Heart: Regular rate   Lungs: Clear to auscultation bilaterally     I reviewed the patient's new clinical results.     Lab Results (last 24 hours)     Procedure Component Value Units Date/Time    Comprehensive Metabolic Panel [[id number]]  (Abnormal) Collected: 11/05/20 0527    Specimen: Blood from Hand, Right Updated: 11/05/20 0659     Glucose 129 mg/dL      BUN 8 mg/dL      Creatinine 0.86 mg/dL      Sodium 138 mmol/L      Potassium 4.5 mmol/L      Chloride 108 mmol/L      CO2 20.7 mmol/L      Calcium 8.6 mg/dL      Total Protein 6.9 g/dL      Albumin 3.80 g/dL      ALT (SGPT) 91 U/L      AST (SGOT) 42 U/L      Alkaline Phosphatase 71 U/L      Total Bilirubin 0.3 mg/dL      eGFR Non African Amer 72 mL/min/1.73      " Globulin 3.1 gm/dL      A/G Ratio 1.2 g/dL      BUN/Creatinine Ratio 9.3     Anion Gap 9.3 mmol/L     Narrative:      GFR Normal >60  Chronic Kidney Disease <60  Kidney Failure <15      Phosphorus [[id number]]  (Normal) Collected: 11/05/20 0527    Specimen: Blood from Hand, Right Updated: 11/05/20 0659     Phosphorus 3.2 mg/dL     Magnesium [[id number]]  (Normal) Collected: 11/05/20 0527    Specimen: Blood from Hand, Right Updated: 11/05/20 0659     Magnesium 2.4 mg/dL     CBC & Differential [[id number]]  (Abnormal) Collected: 11/05/20 0527    Specimen: Blood Updated: 11/05/20 0608    Narrative:      The following orders were created for panel order CBC & Differential.  Procedure                               Abnormality         Status                     ---------                               -----------         ------                     CBC Auto Differential[[id number]]        Abnormal            Final result                 Please view results for these tests on the individual orders.    CBC Auto Differential [[id number]]  (Abnormal) Collected: 11/05/20 0527    Specimen: Blood Updated: 11/05/20 0608     WBC 18.46 10*3/mm3      RBC 4.87 10*6/mm3      Hemoglobin 13.8 g/dL      Hematocrit 41.8 %      MCV 85.8 fL      MCH 28.3 pg      MCHC 33.0 g/dL      RDW 14.2 %      RDW-SD 44.4 fl      MPV 10.3 fL      Platelets 334 10*3/mm3      Neutrophil % 86.9 %      Lymphocyte % 8.2 %      Monocyte % 4.3 %      Eosinophil % 0.0 %      Basophil % 0.1 %      Immature Grans % 0.5 %      Neutrophils, Absolute 16.03 10*3/mm3      Lymphocytes, Absolute 1.51 10*3/mm3      Monocytes, Absolute 0.80 10*3/mm3      Eosinophils, Absolute 0.00 10*3/mm3      Basophils, Absolute 0.02 10*3/mm3      Immature Grans, Absolute 0.10 10*3/mm3      nRBC 0.0 /100 WBC              Assessment:      Doing well postoperatively.      Plan:   1. Continue Stage 1 diet  2. Continue with ambulation and Incentive spirometry  3. Plan for d/c home    Patient was  seen and examined by Dr. Ramirez.    Hospital Course: The patient is a very pleasant 40 y.o. female that was admitted to the hospital with morbid obesity who underwent above procedure.  Postoperatively the patient was transferred to the bariatric unit per protocol.  Patient remained afebrile and hemodynamically stable.  Patient was up ambulating well and using incentive spirometer.  Postoperatively day 1 patient was started on stage I bariatric diet and continued with good ambulation.  Lovenox was continued.  Patient was then discharged home.      Discharge medications:      Discharge Medications      New Medications      Instructions Start Date   HYDROmorphone 2 MG tablet  Commonly known as: Dilaudid   2 mg, Oral, Every 4 Hours PRN      ondansetron ODT 4 MG disintegrating tablet  Commonly known as: ZOFRAN-ODT   4 mg, Translingual, Every 4 Hours PRN         Continue These Medications      Instructions Start Date   dicyclomine 20 MG tablet  Commonly known as: BENTYL   20 mg, Oral, As Needed      esomeprazole 20 MG capsule  Commonly known as: nexIUM   20 mg, Oral, Every Morning Before Breakfast      folic acid-vit B6-vit B12 2.5-25-1 MG tablet tablet  Commonly known as: FOLBEE   1 tablet, Oral, Daily      levothyroxine 75 MCG tablet  Commonly known as: SYNTHROID, LEVOTHROID   No dose, route, or frequency recorded.      metoprolol succinate XL 25 MG 24 hr tablet  Commonly known as: TOPROL-XL   1 tablet, Oral, Daily      multivitamin with minerals tablet tablet   1 tablet, Oral, Daily      ursodiol 300 MG capsule  Commonly known as: Actigall  Notes to patient: Start 1 week after surgery   300 mg, Oral, 2 Times Daily             Discharge instructions:  Per Bariatric manual; per our protocol      Follow-up appointment: Follow up with Dr. Ramirez in the office as scheduled.  If not already scheduled call for appointment at [phone].

## 2020-11-05 NOTE — PLAN OF CARE
Goal Outcome Evaluation:  Plan of Care Reviewed With: patient  Progress: no change   Outcome Summary: VSS, walking in colindres, tolerating diet; discharge home today, see orders

## 2020-11-06 ENCOUNTER — TELEPHONE (OUTPATIENT)
Dept: BARIATRICS/WEIGHT MGMT | Facility: CLINIC | Age: 40
End: 2020-11-06

## 2020-11-06 NOTE — TELEPHONE ENCOUNTER
Called pt re stage 1 diet and protein  50g protein yesterday. Nauseated. Managing Stage 1 clear liquids  Diet stages discussed.

## 2020-11-10 ENCOUNTER — OFFICE VISIT (OUTPATIENT)
Dept: BARIATRICS/WEIGHT MGMT | Facility: CLINIC | Age: 40
End: 2020-11-10

## 2020-11-10 ENCOUNTER — TELEMEDICINE (OUTPATIENT)
Dept: NEUROLOGY | Facility: CLINIC | Age: 40
End: 2020-11-10

## 2020-11-10 VITALS
WEIGHT: 290 LBS | DIASTOLIC BLOOD PRESSURE: 87 MMHG | RESPIRATION RATE: 18 BRPM | HEIGHT: 67 IN | SYSTOLIC BLOOD PRESSURE: 126 MMHG | TEMPERATURE: 98.2 F | HEART RATE: 84 BPM | BODY MASS INDEX: 45.52 KG/M2

## 2020-11-10 DIAGNOSIS — K44.9 HIATAL HERNIA: ICD-10-CM

## 2020-11-10 DIAGNOSIS — Z98.84 S/P LAPAROSCOPIC SLEEVE GASTRECTOMY: ICD-10-CM

## 2020-11-10 DIAGNOSIS — E66.01 OBESITY, CLASS III, BMI 40-49.9 (MORBID OBESITY) (HCC): Primary | ICD-10-CM

## 2020-11-10 DIAGNOSIS — M54.2 CHRONIC NECK PAIN: ICD-10-CM

## 2020-11-10 DIAGNOSIS — G89.29 CHRONIC NECK PAIN: ICD-10-CM

## 2020-11-10 DIAGNOSIS — K31.7 MULTIPLE GASTRIC POLYPS: ICD-10-CM

## 2020-11-10 DIAGNOSIS — M54.81 OCCIPITAL NEURALGIA OF RIGHT SIDE: Primary | ICD-10-CM

## 2020-11-10 DIAGNOSIS — Z71.3 DIETARY COUNSELING: ICD-10-CM

## 2020-11-10 PROCEDURE — 99024 POSTOP FOLLOW-UP VISIT: CPT | Performed by: SURGERY

## 2020-11-10 PROCEDURE — 99213 OFFICE O/P EST LOW 20 MIN: CPT | Performed by: PSYCHIATRY & NEUROLOGY

## 2020-11-10 NOTE — PROGRESS NOTES
[facility]ATR[facility] BARIATRIC SURGERY  [address] [facility] [address]  [facility] [id number] [phone]  [address] [facility] [address]  [facility] [id number] [phone]  Dept: [phone]  11/10/2020      Valentina Anderson.  [id number]  [id number]  1980  female      Chief Complaint   Patient presents with   • Post-op     1 WEEK POST OP SLEEVE       BH Post-Op Bariatric Surgery:   Valentina Anderson is status post laparopscopic Laparoscopic Sleeve/HH procedure, performed on 11/4/20.     HPI:   Today's weight is 132 kg (290 lb) pounds, today's BMI is Body mass index is 44.83 kg/m².,@ has a  loss of 13 pounds since the last visit and@ weight loss since surgery is 13/21 pounds. The patient reports a decreased portion size and loss of appetite.  Valentina Anderson denies nausea vomiting fever chills chest pain shortness of air or lower extremity pain and reports feeling better each day. The patient c/o appropriate post-op incisional discomfort that is improving. she is doing well with protein and water intake so far. Taking their vitamins, walking and using IS. Denies fevers, chills, chest pain or shortness of air.      Diet and Exercise: Diet history reviewed and discussed with the patient. Weight loss/gains to date discussed with the patient. No carbonated beverage consumption and exercising regularly- walking frequently.   Supplements: multivitamins, B-12, calcium, iron, B-1 and Vitamin D.   Patient is taking blood thinner as prescribed: Not indicated  Current outpatient and discharge medications have been reconciled for the patient.  Reviewed by: Marc Ramirez Jr., MD        Review of Systems   Constitutional: Positive for fatigue.   Gastrointestinal: Positive for constipation.   All other systems reviewed and are negative.      Patient Active Problem List   Diagnosis   • Chiari malformation type I (CMS/HCC)   • Dizziness   • New daily persistent headache   • Surgery follow-up  examination   • History of brain surgery   • Degeneration of intervertebral disc at C6-C7 level   • Bilateral occipital neuralgia   • Chronic fatigue   • Multiple joint pain   • Anxiety and depression   • Hypothyroid   • PCOS (polycystic ovarian syndrome)   • Chronic GERD   • Obesity, Class III, BMI 40-49.9 (morbid obesity) (CMS/HCC)   • Endometriosis   • Multiple gastric polyps   • S/P laparoscopic sleeve gastrectomy   • Dietary counseling       The following portions of the patient's history were reviewed and updated as appropriate: allergies, current medications, past family history, past medical history, past social history, past surgical history and problem list.    Vitals:    11/10/20 1455   BP: 126/87   Pulse: 84   Resp: 18   Temp: 98.2 °F (36.8 °C)       Physical Exam  Vitals signs reviewed.   HENT:      Head: Normocephalic and atraumatic.      Mouth/Throat:      Mouth: Mucous membranes are moist.      Pharynx: Oropharynx is clear.   Eyes:      General: No scleral icterus.     Extraocular Movements: Extraocular movements intact.      Conjunctiva/sclera: Conjunctivae normal.      Pupils: Pupils are equal, round, and reactive to light.   Neck:      Musculoskeletal: Normal range of motion and neck supple.      Thyroid: No thyromegaly.   Cardiovascular:      Rate and Rhythm: Normal rate.   Pulmonary:      Effort: Pulmonary effort is normal. No respiratory distress.      Breath sounds: Normal breath sounds. No stridor. No wheezing or rhonchi.   Abdominal:      General: Bowel sounds are normal. There is no distension.      Palpations: Abdomen is soft. There is no mass.      Tenderness: There is no abdominal tenderness. There is no right CVA tenderness, left CVA tenderness, guarding or rebound.      Hernia: No hernia is present.      Comments: Incisions clean, dry and intact without erythema and appropriate tenderness   Musculoskeletal: Normal range of motion.   Lymphadenopathy:      Cervical: No cervical  adenopathy.   Skin:     General: Skin is warm and dry.      Findings: No erythema.   Neurological:      Mental Status: She is alert and oriented to person, place, and time.   Psychiatric:         Mood and Affect: Mood normal.         Behavior: Behavior normal.         Thought Content: Thought content normal.         Judgment: Judgment normal.         Assessment:   Post-op, the patient is postop day 6 status post laparoscopic sleeve gastrectomy and paraesophageal hernia repair.  Patient is afebrile and hemodynamically stable abdomen benign on exam.  She is tolerating stage I and will advance to stage II.  We will start her vitamin regimen..     Encounter Diagnoses   Name Primary?   • Obesity, Class III, BMI 40-49.9 (morbid obesity) (CMS/HCC) Yes   • S/P laparoscopic sleeve gastrectomy    • Hiatal hernia    • Dietary counseling    • Multiple gastric polyps        Plan:   Reviewed with patient the importance of following the manual for diet progression. Increase activity as tolerated. Continue increasing daily intake of protein and water.   Return to work: the patient is to return to 3 weeks from their surgery date with no restrictions unless they develop medical problems in which we will see them back in the office. They received a note in our office today with their return to work date.  Activity restrictions: no lifting, pushing or pulling over 25lbs for 3 weeks.   Recommended patient be sure to get at least 70 grams of protein per day. Discussed with the patient the recommended amount of water per day to intake. Reviewed vitamin requirements. Be sure to do routine exercise and increase activity as tolerated. No asa, nsaids or steroids for 8 weeks if gastric sleeve procedure and lifelong if gastric bypass procedure.. Patient may use miralax as needed if necessary.     Instructions / Recommendations: dietary counseling recommended, recommended a daily protein intake of  grams, vitamin supplement(s) recommended,  recommended exercising at least 150 minutes per week, behavior modifications recommended and instructed to call the office for concerns, questions, or problems.     The patient was instructed to follow up at one month follow up appt.     The patient was counseled regarding post op bariatric manual

## 2020-12-08 ENCOUNTER — OFFICE VISIT (OUTPATIENT)
Dept: BARIATRICS/WEIGHT MGMT | Facility: CLINIC | Age: 40
End: 2020-12-08

## 2020-12-08 VITALS
TEMPERATURE: 97.5 F | SYSTOLIC BLOOD PRESSURE: 124 MMHG | HEART RATE: 78 BPM | DIASTOLIC BLOOD PRESSURE: 81 MMHG | WEIGHT: 278 LBS | RESPIRATION RATE: 18 BRPM | BODY MASS INDEX: 43.63 KG/M2 | HEIGHT: 67 IN

## 2020-12-08 DIAGNOSIS — K21.9 CHRONIC GERD: ICD-10-CM

## 2020-12-08 DIAGNOSIS — Z71.3 DIETARY COUNSELING: ICD-10-CM

## 2020-12-08 DIAGNOSIS — E66.01 OBESITY, CLASS III, BMI 40-49.9 (MORBID OBESITY) (HCC): Primary | ICD-10-CM

## 2020-12-08 DIAGNOSIS — R53.82 CHRONIC FATIGUE: ICD-10-CM

## 2020-12-08 DIAGNOSIS — Z98.84 S/P LAPAROSCOPIC SLEEVE GASTRECTOMY: ICD-10-CM

## 2020-12-08 PROCEDURE — 99024 POSTOP FOLLOW-UP VISIT: CPT | Performed by: SURGERY

## 2020-12-08 NOTE — PROGRESS NOTES
[facility]IATR[facility] BARIATRIC SURGERY  [address] [address]  [phone]  [address] [address]  [phone]  Dept: [phone]  12/8/2020      Valentina Anderson.  [id number]  [id number]  1980  female      Chief Complaint   Patient presents with   • Post-op     1 MONTH POST OP SLEEVE       BH Post-Op Bariatric Surgery:   Valentina Anderson is status post Laparoscopic Sleeve/HH procedure, performed on 11/4/20     HPI:   Today's weight is 126 kg (278 lb) pounds, today's BMI is Body mass index is 42.97 kg/m².,@ has a  loss of 12 pounds since the last visit and@ weight loss since surgery is 33 pounds. The patient reports a decreased portion size and loss of appetite.      Valentina Anderson denies fever chills chest pain shortness of air abdominal or lower extremity pain and reports occasional nausea and hiccups     Diet and Exercise: Diet history reviewed and discussed with the patient. Weight loss/gains to date discussed with the patient. The patient states they are eating 60 grams of protein per day. She reports eating 3-4 meals per day, a typical portion size of 1 cup, eating 1 snacks per day, drinking 5 or more 8-oz. glasses of water per day, no carbonated beverage consumption and exercising regularly.     Supplements: Bariatric per gastric sleeve.     Review of Systems   Constitutional: Positive for fatigue.   Gastrointestinal: Positive for constipation and nausea.   All other systems reviewed and are negative.      Patient Active Problem List   Diagnosis   • Chiari malformation type I (CMS/HCC)   • Dizziness   • New daily persistent headache   • History of brain surgery   • Degeneration of intervertebral disc at C6-C7 level   • Bilateral occipital neuralgia   • Chronic fatigue   • Multiple joint pain   • Anxiety and depression   • Hypothyroid   • PCOS (polycystic ovarian syndrome)   • Chronic GERD   • Obesity, Class III, BMI  40-49.9 (morbid obesity) (CMS/HCC)   • Endometriosis   • Multiple gastric polyps   • S/P laparoscopic sleeve gastrectomy   • Dietary counseling       Past Medical History:   Diagnosis Date   • Chiari I malformation (CMS/HCC)    • DDD (degenerative disc disease), cervical    • DDD (degenerative disc disease), cervical    • Disease of thyroid gland    • Dizziness    • Endometriosis    • GERD (gastroesophageal reflux disease)    • Headache    • Heart palpitations    • Hiatal hernia    • PONV (postoperative nausea and vomiting)        The following portions of the patient's history were reviewed and updated as appropriate: allergies, current medications, past family history, past medical history, past social history, past surgical history and problem list.    Vitals:    12/08/20 1357   BP: 124/81   Pulse: 78   Resp: 18   Temp: 97.5 °F (36.4 °C)       Physical Exam  Vitals signs reviewed.   HENT:      Head: Normocephalic and atraumatic.      Mouth/Throat:      Mouth: Mucous membranes are moist.      Pharynx: Oropharynx is clear.   Eyes:      General: No scleral icterus.     Extraocular Movements: Extraocular movements intact.      Conjunctiva/sclera: Conjunctivae normal.      Pupils: Pupils are equal, round, and reactive to light.   Neck:      Musculoskeletal: Normal range of motion and neck supple.      Thyroid: No thyromegaly.   Cardiovascular:      Rate and Rhythm: Normal rate.   Pulmonary:      Effort: Pulmonary effort is normal. No respiratory distress.      Breath sounds: Normal breath sounds. No stridor. No wheezing or rhonchi.   Abdominal:      General: Bowel sounds are normal. There is no distension.      Palpations: Abdomen is soft. There is no mass.      Tenderness: There is no abdominal tenderness. There is no right CVA tenderness, left CVA tenderness, guarding or rebound.      Hernia: No hernia is present.   Musculoskeletal: Normal range of motion.   Lymphadenopathy:      Cervical: No cervical adenopathy.    Skin:     General: Skin is warm and dry.      Findings: No erythema.   Neurological:      Mental Status: She is alert and oriented to person, place, and time.   Psychiatric:         Mood and Affect: Mood normal.         Behavior: Behavior normal.         Thought Content: Thought content normal.         Judgment: Judgment normal.         Assessment:   Post-op, the patient 1 month status post laparoscopic sleeve gastrectomy and paraesophageal hernia repair. Patient is afebrile and hemodynamically stable and abdomen is benign on exam. She is tolerating her diet without dysphagia. Does have hiccups just after eating at times and does have occasional nausea. I instructed patient to make sure she is taking her time and eating slow and not drinking with her meals. Also discussed not eating prior to going to bed and also adding strength training to her exercise routine. We will check 1 month labs..     Encounter Diagnoses   Name Primary?   • Obesity, Class III, BMI 40-49.9 (morbid obesity) (CMS/[facility]) Yes   • S/P laparoscopic sleeve gastrectomy    • Dietary counseling    • Chronic fatigue    • Chronic GERD        Plan:     Encouraged patient to be sure to get plenty of lean protein per day through small frequent meals all with a protein source.   Activity restrictions: none.   Recommended patient be sure to get at least 70 grams of protein per day by eating small, frequent meals all with high lean protein choices. Be sure to limit/cut back on daily carbohydrate intake. Discussed with the patient the recommended amount of water per day to intake- half of body weight in ounces. Reviewed vitamin requirements. Be sure to do routine exercise, 150 minutes per week minimum, including both cardio and strength training.     Instructions / Recommendations: dietary counseling recommended, recommended a daily protein intake of  grams, vitamin supplement(s) recommended, recommended exercising at least 150 minutes per week,  behavior modifications recommended and instructed to call the office for concerns, questions, or problems.     The patient was instructed to follow up in 2 months.     The patient was counseled regarding. Total time spent face to face was 20 minutes and 15 minutes was spent counseling.

## 2020-12-09 ENCOUNTER — HOSPITAL ENCOUNTER (OUTPATIENT)
Dept: OTHER | Facility: HOSPITAL | Age: 40
Discharge: HOME | End: 2020-12-09

## 2020-12-09 LAB
25(OH)D3 SERPL-MCNC: 49.2 NG/ML (ref 30–100)
ALBUMIN SERPL-MCNC: 3.6 G/DL (ref 3.5–5)
ALBUMIN/GLOB SERPL: 1 {RATIO} (ref 1.4–2.6)
ALP SERPL-CCNC: 102 U/L (ref 42–98)
ALT SERPL-CCNC: 173 U/L (ref 10–40)
ANION GAP SERPL CALC-SCNC: 16 MMOL/L (ref 8–19)
AST SERPL-CCNC: 67 U/L (ref 15–50)
BASOPHILS # BLD AUTO: 0.02 10*3/UL (ref 0–0.2)
BASOPHILS NFR BLD AUTO: 0.3 % (ref 0–3)
BILIRUB SERPL-MCNC: 0.45 MG/DL (ref 0.2–1.3)
BUN SERPL-MCNC: 7 MG/DL (ref 5–25)
BUN/CREAT SERPL: 9 {RATIO} (ref 6–20)
CALCIUM SERPL-MCNC: 9.4 MG/DL (ref 8.7–10.4)
CHLORIDE SERPL-SCNC: 106 MMOL/L (ref 99–111)
CONV ABS IMM GRAN: 0.02 10*3/UL (ref 0–0.2)
CONV CO2: 22 MMOL/L (ref 22–32)
CONV IMMATURE GRAN: 0.3 % (ref 0–1.8)
CONV TOTAL PROTEIN: 7.1 G/DL (ref 6.3–8.2)
CREAT UR-MCNC: 0.81 MG/DL (ref 0.5–0.9)
DEPRECATED RDW RBC AUTO: 47.5 FL (ref 36.4–46.3)
EOSINOPHIL # BLD AUTO: 0.08 10*3/UL (ref 0–0.7)
EOSINOPHIL # BLD AUTO: 1.3 % (ref 0–7)
ERYTHROCYTE [DISTWIDTH] IN BLOOD BY AUTOMATED COUNT: 15.3 % (ref 11.7–14.4)
FERRITIN SERPL-MCNC: 41 NG/ML (ref 10–200)
FOLATE SERPL-MCNC: >20 NG/ML (ref 4.8–20)
GFR SERPLBLD BASED ON 1.73 SQ M-ARVRAT: >60 ML/MIN/{1.73_M2}
GLOBULIN UR ELPH-MCNC: 3.5 G/DL (ref 2–3.5)
GLUCOSE SERPL-MCNC: 90 MG/DL (ref 65–99)
HCT VFR BLD AUTO: 44.3 % (ref 37–47)
HGB BLD-MCNC: 14.4 G/DL (ref 12–16)
IRON SERPL-MCNC: 107 UG/DL (ref 60–170)
LYMPHOCYTES # BLD AUTO: 1.63 10*3/UL (ref 1–5)
LYMPHOCYTES NFR BLD AUTO: 26.3 % (ref 20–45)
MAGNESIUM SERPL-MCNC: 1.99 MG/DL (ref 1.6–2.3)
MCH RBC QN AUTO: 27.5 PG (ref 27–31)
MCHC RBC AUTO-ENTMCNC: 32.5 G/DL (ref 33–37)
MCV RBC AUTO: 84.7 FL (ref 81–99)
MONOCYTES # BLD AUTO: 0.5 10*3/UL (ref 0.2–1.2)
MONOCYTES NFR BLD AUTO: 8.1 % (ref 3–10)
NEUTROPHILS # BLD AUTO: 3.94 10*3/UL (ref 2–8)
NEUTROPHILS NFR BLD AUTO: 63.7 % (ref 30–85)
NRBC CBCN: 0 % (ref 0–0.7)
OSMOLALITY SERPL CALC.SUM OF ELEC: 290 MOSM/KG (ref 273–304)
PHOSPHATE SERPL-MCNC: 2.4 MG/DL (ref 2.4–4.5)
PLATELET # BLD AUTO: 292 10*3/UL (ref 130–400)
PMV BLD AUTO: 9.7 FL (ref 9.4–12.3)
POTASSIUM SERPL-SCNC: 3.4 MMOL/L (ref 3.5–5.3)
PREALB SERPL-MCNC: 20.6 MG/DL (ref 20–40)
RBC # BLD AUTO: 5.23 10*6/UL (ref 4.2–5.4)
SODIUM SERPL-SCNC: 141 MMOL/L (ref 135–147)
WBC # BLD AUTO: 6.19 10*3/UL (ref 4.8–10.8)

## 2020-12-12 LAB — CONV VITAMIN B-1 (THIAMINE): 113.8 NMOL/L (ref 66.5–200)

## 2020-12-14 DIAGNOSIS — Z98.84 S/P LAPAROSCOPIC SLEEVE GASTRECTOMY: ICD-10-CM

## 2020-12-14 DIAGNOSIS — R53.82 CHRONIC FATIGUE: ICD-10-CM

## 2020-12-14 DIAGNOSIS — K21.9 CHRONIC GERD: ICD-10-CM

## 2020-12-14 DIAGNOSIS — Z71.3 DIETARY COUNSELING: ICD-10-CM

## 2020-12-14 DIAGNOSIS — E66.01 OBESITY, CLASS III, BMI 40-49.9 (MORBID OBESITY) (HCC): ICD-10-CM

## 2020-12-16 LAB — METHYLMALONATE SERPL-SCNC: 73 NMOL/L (ref 0–378)

## 2020-12-28 ENCOUNTER — HOSPITAL ENCOUNTER (OUTPATIENT)
Dept: OTHER | Facility: HOSPITAL | Age: 40
Discharge: HOME | End: 2020-12-28
Attending: INTERNAL MEDICINE

## 2021-01-15 ENCOUNTER — HOSPITAL ENCOUNTER (OUTPATIENT)
Dept: LAB | Facility: HOSPITAL | Age: 41
Discharge: HOME | End: 2021-01-15

## 2021-01-15 LAB
ALBUMIN SERPL-MCNC: 3.7 G/DL (ref 3.5–5)
ALBUMIN/GLOB SERPL: 1.2 {RATIO} (ref 1.4–2.6)
ALP SERPL-CCNC: 66 U/L (ref 42–98)
ALT SERPL-CCNC: 32 U/L (ref 10–40)
ANION GAP SERPL CALC-SCNC: 18 MMOL/L (ref 8–19)
AST SERPL-CCNC: 21 U/L (ref 15–50)
BASOPHILS # BLD AUTO: 0.07 10*3/UL (ref 0–0.2)
BASOPHILS NFR BLD AUTO: 1.1 % (ref 0–3)
BILIRUB SERPL-MCNC: 0.34 MG/DL (ref 0.2–1.3)
BUN SERPL-MCNC: 8 MG/DL (ref 5–25)
BUN/CREAT SERPL: 10 {RATIO} (ref 6–20)
CALCIUM SERPL-MCNC: 9.1 MG/DL (ref 8.7–10.4)
CHLORIDE SERPL-SCNC: 109 MMOL/L (ref 99–111)
CHOLEST SERPL-MCNC: 146 MG/DL (ref 107–200)
CHOLEST/HDLC SERPL: 4.7 {RATIO} (ref 3–6)
CONV ABS IMM GRAN: 0.01 10*3/UL (ref 0–0.2)
CONV CO2: 18 MMOL/L (ref 22–32)
CONV IMMATURE GRAN: 0.2 % (ref 0–1.8)
CONV TOTAL PROTEIN: 6.8 G/DL (ref 6.3–8.2)
CREAT UR-MCNC: 0.81 MG/DL (ref 0.5–0.9)
DEPRECATED RDW RBC AUTO: 53.4 FL (ref 36.4–46.3)
EOSINOPHIL # BLD AUTO: 0.14 10*3/UL (ref 0–0.7)
EOSINOPHIL # BLD AUTO: 2.2 % (ref 0–7)
ERYTHROCYTE [DISTWIDTH] IN BLOOD BY AUTOMATED COUNT: 16.4 % (ref 11.7–14.4)
GFR SERPLBLD BASED ON 1.73 SQ M-ARVRAT: >60 ML/MIN/{1.73_M2}
GLOBULIN UR ELPH-MCNC: 3.1 G/DL (ref 2–3.5)
GLUCOSE SERPL-MCNC: 89 MG/DL (ref 65–99)
HCT VFR BLD AUTO: 44.6 % (ref 37–47)
HDLC SERPL-MCNC: 31 MG/DL (ref 40–60)
HGB BLD-MCNC: 14.1 G/DL (ref 12–16)
LDLC SERPL CALC-MCNC: 97 MG/DL (ref 70–100)
LYMPHOCYTES # BLD AUTO: 2.17 10*3/UL (ref 1–5)
LYMPHOCYTES NFR BLD AUTO: 34.3 % (ref 20–45)
MCH RBC QN AUTO: 28.3 PG (ref 27–31)
MCHC RBC AUTO-ENTMCNC: 31.6 G/DL (ref 33–37)
MCV RBC AUTO: 89.4 FL (ref 81–99)
MONOCYTES # BLD AUTO: 0.46 10*3/UL (ref 0.2–1.2)
MONOCYTES NFR BLD AUTO: 7.3 % (ref 3–10)
NEUTROPHILS # BLD AUTO: 3.47 10*3/UL (ref 2–8)
NEUTROPHILS NFR BLD AUTO: 54.9 % (ref 30–85)
NRBC CBCN: 0 % (ref 0–0.7)
OSMOLALITY SERPL CALC.SUM OF ELEC: 290 MOSM/KG (ref 273–304)
PLATELET # BLD AUTO: 307 10*3/UL (ref 130–400)
PMV BLD AUTO: 11.3 FL (ref 9.4–12.3)
POTASSIUM SERPL-SCNC: 3.5 MMOL/L (ref 3.5–5.3)
RBC # BLD AUTO: 4.99 10*6/UL (ref 4.2–5.4)
SODIUM SERPL-SCNC: 141 MMOL/L (ref 135–147)
TRIGL SERPL-MCNC: 89 MG/DL (ref 40–150)
TSH SERPL-ACNC: 2.1 M[IU]/L (ref 0.27–4.2)
VLDLC SERPL-MCNC: 18 MG/DL (ref 5–37)
WBC # BLD AUTO: 6.32 10*3/UL (ref 4.8–10.8)

## 2021-01-21 ENCOUNTER — HOSPITAL ENCOUNTER (OUTPATIENT)
Dept: OTHER | Facility: HOSPITAL | Age: 41
Discharge: HOME | End: 2021-01-21
Attending: INTERNAL MEDICINE

## 2021-02-19 ENCOUNTER — OFFICE VISIT (OUTPATIENT)
Dept: BARIATRICS/WEIGHT MGMT | Facility: CLINIC | Age: 41
End: 2021-02-19

## 2021-02-19 VITALS
BODY MASS INDEX: 40.34 KG/M2 | HEART RATE: 68 BPM | TEMPERATURE: 96 F | HEIGHT: 67 IN | RESPIRATION RATE: 18 BRPM | SYSTOLIC BLOOD PRESSURE: 140 MMHG | DIASTOLIC BLOOD PRESSURE: 91 MMHG | WEIGHT: 257 LBS

## 2021-02-19 DIAGNOSIS — Z98.84 S/P LAPAROSCOPIC SLEEVE GASTRECTOMY: ICD-10-CM

## 2021-02-19 DIAGNOSIS — Z71.3 DIETARY COUNSELING: ICD-10-CM

## 2021-02-19 DIAGNOSIS — K21.9 CHRONIC GERD: ICD-10-CM

## 2021-02-19 DIAGNOSIS — E28.2 PCOS (POLYCYSTIC OVARIAN SYNDROME): ICD-10-CM

## 2021-02-19 DIAGNOSIS — E66.9 OBESITY, CLASS II, BMI 35-39.9: Primary | ICD-10-CM

## 2021-02-19 PROBLEM — E66.01 OBESITY, CLASS III, BMI 40-49.9 (MORBID OBESITY) (HCC): Status: RESOLVED | Noted: 2020-09-16 | Resolved: 2021-02-19

## 2021-02-19 PROBLEM — E66.812 OBESITY, CLASS II, BMI 35-39.9: Status: ACTIVE | Noted: 2021-02-19

## 2021-02-19 PROBLEM — E66.813 OBESITY, CLASS III, BMI 40-49.9 (MORBID OBESITY): Status: RESOLVED | Noted: 2020-09-16 | Resolved: 2021-02-19

## 2021-02-19 PROCEDURE — 99213 OFFICE O/P EST LOW 20 MIN: CPT | Performed by: SURGERY

## 2021-02-19 RX ORDER — CA/D3/MAG OX/ZINC/COP/MANG/BOR 600 MG-800
TABLET,CHEWABLE ORAL
COMMUNITY
Start: 2020-12-09 | End: 2022-04-20

## 2021-02-19 RX ORDER — LEVONORGESTREL AND ETHINYL ESTRADIOL AND ETHINYL ESTRADIOL 0.15MG(84)
KIT ORAL
COMMUNITY
Start: 2020-12-10 | End: 2021-12-06 | Stop reason: SDUPTHER

## 2021-02-19 NOTE — PROGRESS NOTES
[facility] BARIATRIC [facility] BARIATRIC SURGERY  [address] [facility] [address]  [facility] [address] [phone]  [address] [address] [phone]  Dept: [phone]  2/19/2021      Valentina Anderson.  [id number]  [id number]  1980  female      Chief Complaint   Patient presents with   • Follow-up     3 month sleeve       BH Post-Op Bariatric Surgery:   Valentina Anderson is status post Laparoscopic Sleeve/HH procedure, performed on 11/4/20     HPI:   Today's weight is 117 kg (257 lb) pounds, today's BMI is Body mass index is 39.73 kg/m².,@ has a  loss of 21 pounds since the last visit and@ weight loss since surgery is 54 pounds. The patient reports a decreased portion size and loss of appetite.      Valentina Anderson denies nausea vomiting fever chills chest pain shortness of air or abdominal pain and reports feeling well but does have occasional constipation and some hair thinning     Diet and Exercise: Diet history reviewed and discussed with the patient. Weight loss/gains to date discussed with the patient. The patient states they are eating 80 grams of protein per day. She reports eating 3 meals per day, a typical portion size of 1 cup, eating 2 snacks per day, drinking 6 or more 8-oz. glasses of water per day, no carbonated beverage consumption and exercising regularly.     Supplements: Bariatric per gastric sleeve, B complex, vitamin D, calcium, B12.     Review of Systems   Gastrointestinal: Positive for constipation.   Musculoskeletal: Positive for arthralgias.   All other systems reviewed and are negative.      Patient Active Problem List   Diagnosis   • Chiari malformation type I (CMS/HCC)   • Dizziness   • New daily persistent headache   • History of brain surgery   • Degeneration of intervertebral disc at C6-C7 level   • Bilateral occipital neuralgia   • Chronic fatigue   • Multiple joint pain   • Anxiety and depression   • Hypothyroid   • PCOS  (polycystic ovarian syndrome)   • Chronic GERD   • Endometriosis   • Multiple gastric polyps   • S/P laparoscopic sleeve gastrectomy   • Dietary counseling   • Obesity, Class II, BMI 35-39.9       Past Medical History:   Diagnosis Date   • Chiari I malformation (CMS/HCC)    • DDD (degenerative disc disease), cervical    • DDD (degenerative disc disease), cervical    • Disease of thyroid gland    • Dizziness    • Endometriosis    • GERD (gastroesophageal reflux disease)    • Headache    • Heart palpitations    • Hiatal hernia    • PONV (postoperative nausea and vomiting)        The following portions of the patient's history were reviewed and updated as appropriate: allergies, current medications, past family history, past medical history, past social history, past surgical history and problem list.    Vitals:    02/19/21 0927   BP: 140/91   Pulse: 68   Resp: 18   Temp: 96 °F (35.6 °C)       Physical Exam  Vitals signs reviewed.   HENT:      Head: Normocephalic and atraumatic.      Mouth/Throat:      Mouth: Mucous membranes are moist.      Pharynx: Oropharynx is clear.   Eyes:      General: No scleral icterus.     Extraocular Movements: Extraocular movements intact.      Conjunctiva/sclera: Conjunctivae normal.      Pupils: Pupils are equal, round, and reactive to light.   Neck:      Musculoskeletal: Normal range of motion and neck supple.      Thyroid: No thyromegaly.   Cardiovascular:      Rate and Rhythm: Normal rate.   Pulmonary:      Effort: Pulmonary effort is normal. No respiratory distress.      Breath sounds: Normal breath sounds. No stridor. No wheezing or rhonchi.   Abdominal:      General: Bowel sounds are normal.      Palpations: Abdomen is soft.      Tenderness: There is no abdominal tenderness. There is no right CVA tenderness, left CVA tenderness, guarding or rebound.      Hernia: No hernia is present.   Musculoskeletal: Normal range of motion.   Lymphadenopathy:      Cervical: No cervical adenopathy.    Skin:     General: Skin is warm and dry.      Findings: No erythema.   Neurological:      Mental Status: She is alert and oriented to person, place, and time.   Psychiatric:         Mood and Affect: Mood normal.         Behavior: Behavior normal.         Thought Content: Thought content normal.         Judgment: Judgment normal.         Assessment:   Post-op, the patient 3 months status post laparoscopic sleeve gastrectomy and paraesophageal hernia repair.  Patient is doing well and feels great with her weight loss.  She does have some intermittent constipation which I instructed her to take Metamucil daily and MiraLAX as needed.  No heartburn symptoms which is controlled with her Nexium.  She will continue with that for now.  Blood pressure slightly elevated and will continue with her metoprolol.  1 month labs checked and were all good.  Continue with clean eating and continue with her exercise routine..     Encounter Diagnoses   Name Primary?   • Obesity, Class II, BMI 35-39.9 Yes   • S/P laparoscopic sleeve gastrectomy    • Dietary counseling    • Chronic GERD    • PCOS (polycystic ovarian syndrome)        Plan:     Encouraged patient to be sure to get plenty of lean protein per day through small frequent meals all with a protein source.   Activity restrictions: none.   Recommended patient be sure to get at least 70 grams of protein per day by eating small, frequent meals all with high lean protein choices. Be sure to limit/cut back on daily carbohydrate intake. Discussed with the patient the recommended amount of water per day to intake- half of body weight in ounces. Reviewed vitamin requirements. Be sure to do routine exercise, 150 minutes per week minimum, including both cardio and strength training.     Instructions / Recommendations: dietary counseling recommended, recommended a daily protein intake of  grams, vitamin supplement(s) recommended, recommended exercising at least 150 minutes per week,  behavior modifications recommended and instructed to call the office for concerns, questions, or problems.     The patient was instructed to follow up in 3 months.     The patient was counseled regarding the above procedure. Total time spent face to face was approximately 20 minutes.

## 2021-04-08 ENCOUNTER — HOSPITAL ENCOUNTER (OUTPATIENT)
Dept: CARDIOLOGY | Facility: HOSPITAL | Age: 41
Discharge: HOME | End: 2021-04-08
Attending: NURSE PRACTITIONER

## 2021-05-15 VITALS
RESPIRATION RATE: 16 BRPM | WEIGHT: 293 LBS | BODY MASS INDEX: 45.99 KG/M2 | HEART RATE: 97 BPM | HEIGHT: 67 IN | DIASTOLIC BLOOD PRESSURE: 74 MMHG | OXYGEN SATURATION: 98 % | SYSTOLIC BLOOD PRESSURE: 130 MMHG | TEMPERATURE: 98.1 F

## 2021-05-15 VITALS
WEIGHT: 293 LBS | BODY MASS INDEX: 45.99 KG/M2 | HEART RATE: 89 BPM | HEIGHT: 67 IN | DIASTOLIC BLOOD PRESSURE: 75 MMHG | SYSTOLIC BLOOD PRESSURE: 116 MMHG

## 2021-05-16 VITALS
HEART RATE: 91 BPM | SYSTOLIC BLOOD PRESSURE: 110 MMHG | BODY MASS INDEX: 45.99 KG/M2 | OXYGEN SATURATION: 99 % | WEIGHT: 293 LBS | HEIGHT: 67 IN | DIASTOLIC BLOOD PRESSURE: 74 MMHG | TEMPERATURE: 98.8 F

## 2021-05-16 VITALS
SYSTOLIC BLOOD PRESSURE: 132 MMHG | TEMPERATURE: 98.6 F | HEART RATE: 92 BPM | HEIGHT: 67 IN | DIASTOLIC BLOOD PRESSURE: 80 MMHG | WEIGHT: 293 LBS | OXYGEN SATURATION: 98 % | BODY MASS INDEX: 45.99 KG/M2

## 2021-05-16 VITALS
WEIGHT: 293 LBS | TEMPERATURE: 98.4 F | DIASTOLIC BLOOD PRESSURE: 81 MMHG | HEIGHT: 67 IN | HEART RATE: 84 BPM | OXYGEN SATURATION: 98 % | BODY MASS INDEX: 45.99 KG/M2 | RESPIRATION RATE: 16 BRPM | SYSTOLIC BLOOD PRESSURE: 139 MMHG

## 2021-05-16 VITALS
DIASTOLIC BLOOD PRESSURE: 76 MMHG | TEMPERATURE: 98.1 F | HEIGHT: 67 IN | BODY MASS INDEX: 45.99 KG/M2 | WEIGHT: 293 LBS | OXYGEN SATURATION: 99 % | SYSTOLIC BLOOD PRESSURE: 138 MMHG | HEART RATE: 95 BPM

## 2021-05-16 VITALS
HEART RATE: 80 BPM | WEIGHT: 293 LBS | BODY MASS INDEX: 45.99 KG/M2 | OXYGEN SATURATION: 99 % | DIASTOLIC BLOOD PRESSURE: 70 MMHG | SYSTOLIC BLOOD PRESSURE: 126 MMHG | TEMPERATURE: 98.5 F | HEIGHT: 67 IN

## 2021-05-16 VITALS
OXYGEN SATURATION: 98 % | BODY MASS INDEX: 45.99 KG/M2 | HEART RATE: 93 BPM | HEIGHT: 67 IN | TEMPERATURE: 98.3 F | DIASTOLIC BLOOD PRESSURE: 68 MMHG | RESPIRATION RATE: 16 BRPM | SYSTOLIC BLOOD PRESSURE: 128 MMHG | WEIGHT: 293 LBS

## 2021-05-16 VITALS
HEIGHT: 67 IN | TEMPERATURE: 98.3 F | BODY MASS INDEX: 45.99 KG/M2 | SYSTOLIC BLOOD PRESSURE: 118 MMHG | HEART RATE: 99 BPM | DIASTOLIC BLOOD PRESSURE: 72 MMHG | WEIGHT: 293 LBS | OXYGEN SATURATION: 99 %

## 2021-05-16 VITALS
BODY MASS INDEX: 45.99 KG/M2 | SYSTOLIC BLOOD PRESSURE: 127 MMHG | HEIGHT: 67 IN | RESPIRATION RATE: 16 BRPM | HEART RATE: 88 BPM | DIASTOLIC BLOOD PRESSURE: 79 MMHG | TEMPERATURE: 98.4 F | WEIGHT: 293 LBS | OXYGEN SATURATION: 97 %

## 2021-05-17 ENCOUNTER — TELEPHONE (OUTPATIENT)
Dept: NEUROLOGY | Facility: CLINIC | Age: 41
End: 2021-05-17

## 2021-05-17 NOTE — TELEPHONE ENCOUNTER
Caller: Valentina Anderson    Relationship to patient: Self    Best call back number: [phone]    Chief complaint: OCCIPITAL NEURALGIA    Type of visit: ONB; INJECTION    Requested date: ASAP     Additional notes: PT CALLED STATING THAT SHE WOULD LIKE TO SCHEDULE ONB WITH DR. JANEL CUADRA. PT LAST SAW JANEL ROMAN VIA TELEMEDICINE ON 11/10/21. PT STATES SHE IS WILLING TO SCHEDULE F/U APPT TO DISCUSS ONB.        PLEASE REVIEW AND ADVISE.

## 2021-05-17 NOTE — TELEPHONE ENCOUNTER
"Patient has not had nerve block since 6/4/2020. Does she need a f/u before scheduling? Last video visit was 11/10/2020. Dr Judd said \"may consider occipital nerve block in the future.\"\"    Please review.  Thank you  "

## 2021-05-18 NOTE — TELEPHONE ENCOUNTER
PLEASE ADVISE. TRIED TO CALL PT TO SCHEDULE NERVE BLOCK. HOWEVER THE PHONE RANG AND RANG THEN WAS HUNG UP ON. PLEASE TRANSFER PT BACK TO ME SO I CAN SCHEDULE APT THANK YOU.

## 2021-05-18 NOTE — TELEPHONE ENCOUNTER
Please advise. Pt is scheduled for 6/3/2021. Pt notified if we have a cancellation on the next couple weeks we will call her.

## 2021-05-18 NOTE — TELEPHONE ENCOUNTER
Hub staff attempted to follow warm transfer process and was unsuccessful     Caller: Valentina Anderson    Relationship to patient: Self    Best call back number: [phone]- PT'S OFFICE LINE    Patient is needing: TO SPEAK WITH JIM TO SCHEDULE NERVE BLOCK. JIM ON ANOTHER CALL AT THE TIME OF MS. NELLIE'S RETURN CALL.

## 2021-05-19 ENCOUNTER — OFFICE VISIT (OUTPATIENT)
Dept: BARIATRICS/WEIGHT MGMT | Facility: CLINIC | Age: 41
End: 2021-05-19

## 2021-05-19 VITALS
DIASTOLIC BLOOD PRESSURE: 77 MMHG | WEIGHT: 229 LBS | SYSTOLIC BLOOD PRESSURE: 109 MMHG | BODY MASS INDEX: 35.94 KG/M2 | TEMPERATURE: 97.8 F | RESPIRATION RATE: 18 BRPM | HEART RATE: 77 BPM | HEIGHT: 67 IN

## 2021-05-19 DIAGNOSIS — Z98.84 S/P LAPAROSCOPIC SLEEVE GASTRECTOMY: ICD-10-CM

## 2021-05-19 DIAGNOSIS — F32.A ANXIETY AND DEPRESSION: ICD-10-CM

## 2021-05-19 DIAGNOSIS — E66.9 OBESITY, CLASS II, BMI 35-39.9: Primary | ICD-10-CM

## 2021-05-19 DIAGNOSIS — K21.9 CHRONIC GERD: ICD-10-CM

## 2021-05-19 DIAGNOSIS — R53.82 CHRONIC FATIGUE: ICD-10-CM

## 2021-05-19 DIAGNOSIS — F41.9 ANXIETY AND DEPRESSION: ICD-10-CM

## 2021-05-19 PROCEDURE — 99213 OFFICE O/P EST LOW 20 MIN: CPT | Performed by: NURSE PRACTITIONER

## 2021-05-19 NOTE — PROGRESS NOTES
[facility] BARIATRIC SURGERY  [address] [facility] [address]  [facility] [address]  [phone]  [address] [address]  [phone]  Dept: [phone]  5/19/2021      Valentina Anderson.  [id number]  [id number]  1980  female      Chief Complaint   Patient presents with   • Follow-up     6 month sleeve follow up       BH Post-Op Bariatric Surgery:   Valentina Anderson is status post Laparoscopic Sleeve/PEH repair procedure, performed on 11/4/2020     HPI:   Today's weight is 104 kg (229 lb) pounds, today's BMI is Body mass index is 35.4 kg/m².,@ has a  loss of 28 pounds since the last visit and@ weight loss since surgery is 74 pounds. The patient reports a decreased portion size and loss of appetite.      Valentina Anderson denies nausea, vomiting, reflux, heartburn and reports that she is doing well. She does supplement with a shake most days. She is getting more than 100oz of fluid per day. She has been tracking using [employer]. She is getting between 850-950 calories and getting 65-80g of daily carbs.      Diet and Exercise: Diet history reviewed and discussed with the patient. Weight loss/gains to date discussed with the patient. The patient states they are eating 60 grams of protein per day. She reports eating 3 meals per day, a typical portion size of 1 cup, eating 1-2 snacks per day, drinking 5-6 or more 8-oz. glasses of water per day, no carbonated beverage consumption and exercising regularly- walking 5-6 days per week    Supplements: bariatric pal  MTV with iron and calcium    Review of Systems   Constitutional: Negative for appetite change, fatigue and unexpected weight change.        Hair loss   HENT: Negative.    Eyes: Negative.    Respiratory: Negative.    Cardiovascular: Negative.  Negative for leg swelling.   Gastrointestinal: Negative for abdominal distention, abdominal pain, constipation, diarrhea, nausea and vomiting.    Genitourinary: Negative for difficulty urinating, frequency and urgency.   Musculoskeletal: Negative for back pain.   Skin: Negative.    Psychiatric/Behavioral: Negative.    All other systems reviewed and are negative.      Patient Active Problem List   Diagnosis   • Chiari malformation type I (CMS/HCC)   • Dizziness   • New daily persistent headache   • History of brain surgery   • Degeneration of intervertebral disc at C6-C7 level   • Bilateral occipital neuralgia   • Chronic fatigue   • Multiple joint pain   • Anxiety and depression   • Hypothyroid   • PCOS (polycystic ovarian syndrome)   • Chronic GERD   • Endometriosis   • Multiple gastric polyps   • S/P laparoscopic sleeve gastrectomy   • Dietary counseling   • Obesity, Class II, BMI 35-39.9       Past Medical History:   Diagnosis Date   • Chiari I malformation (CMS/HCC)    • DDD (degenerative disc disease), cervical    • DDD (degenerative disc disease), cervical    • Disease of thyroid gland    • Dizziness    • Endometriosis    • GERD (gastroesophageal reflux disease)    • Headache    • Heart palpitations    • Hiatal hernia    • PONV (postoperative nausea and vomiting)        The following portions of the patient's history were reviewed and updated as appropriate: allergies, current medications, past family history, past medical history, past social history, past surgical history and problem list.    Vitals:    05/19/21 1426   BP: 109/77   Pulse: 77   Resp: 18   Temp: 97.8 °F (36.6 °C)       Physical Exam  Vitals and nursing note reviewed.   Constitutional:       Appearance: She is well-developed.   Neck:      Thyroid: No thyromegaly.   Cardiovascular:      Rate and Rhythm: Normal rate and regular rhythm.      Heart sounds: Normal heart sounds.   Pulmonary:      Effort: Pulmonary effort is normal. No respiratory distress.      Breath sounds: Normal breath sounds. No wheezing.   Abdominal:      General: Bowel sounds are normal. There is no distension.       Palpations: Abdomen is soft.      Tenderness: There is no abdominal tenderness. There is no guarding.      Hernia: No hernia is present.   Musculoskeletal:         General: No tenderness.   Skin:     General: Skin is warm and dry.      Findings: No erythema or rash.   Neurological:      Mental Status: She is alert.   Psychiatric:         Behavior: Behavior normal.         Assessment:   Post-op, the patient is doing well.     Encounter Diagnoses   Name Primary?   • Obesity, Class II, BMI 35-39.9 Yes   • S/P laparoscopic sleeve gastrectomy    • Chronic GERD    • Anxiety and depression    • Chronic fatigue        Plan:   Encouraged patient to be sure to get plenty of lean protein per day through small frequent meals all with a protein source.   Activity restrictions: none.   Recommended patient be sure to get at least 70 grams of protein per day by eating small, frequent meals all with high lean protein choices. Be sure to limit/cut back on daily carbohydrate intake. Discussed with the patient the recommended amount of water per day to intake- half of body weight in ounces. Reviewed vitamin requirements. Be sure to do routine exercise, 150 minutes per week minimum, including both cardio and strength training.     Instructions / Recommendations: dietary counseling recommended, recommended a daily protein intake of  grams, vitamin supplement(s) recommended, recommended exercising at least 150 minutes per week, behavior modifications recommended and instructed to call the office for concerns, questions, or problems.     The patient was instructed to follow up in 3 months .      Total time spent during this encounter today was 3 months

## 2021-05-24 ENCOUNTER — HOSPITAL ENCOUNTER (OUTPATIENT)
Dept: PERIOP | Facility: HOSPITAL | Age: 41
Setting detail: HOSPITAL OUTPATIENT SURGERY
Discharge: HOME | End: 2021-05-24
Attending: SURGERY

## 2021-06-03 NOTE — PROCEDURES
Patient: JUAN JOSÉ BALLARD     Acct: [id number]     Report: #[id number]  MR #:  [id number]     DOS: 2021 1730     : 1980  DICTATING: Gerardo Oreilly  ***Signed***  --------------------------------------------------------------------------------------------------------------------  Gen Surg Post Op/Proc Note      Date       21            Pre-Operative Diagnosis:      Acute cholecystitis            Post-Operative Diagnosis:      Same as pre-op diagnosis            Surgeon/Assistants      Surgeon:  RAMONA Oreilly      Assistants      OR staff            Anesthesia      General            Procedure Performed/Technique      Laparoscopic cholecystectomy            Specimen/Tissue Removed:            Gallbladder            Findings:            Gallbladder tensely distended and acutely inflamed.  Gallbladder contained      multiple gallstones.  Liver normal appearing.            Complications:      No            Estimated Blood Loss:      Less than 30 mL            Procedure:      Indications: Patient is a 41-year-old female who was in the emergency room last     night with abdominal pain.  She has gallstones.  There were no findings     concerning radiographically or with labs for cholecystitis so the patient was     discharged home but her pain worsened so she presented to the office today was     scheduled for surgery.            Technique: Patient was taken to the operating suite placed supine on the     operating table.  Timeout was performed.  General static was administered.  Ab reyes was prepped and draped in sterile fashion.  Using my standard technique     with a Veress needle to establish pneumoperitoneum of my standard for cannula     sizes and locations on the abdominal wall, pneumoperitoneum was established and     4 cannulas were placed.  Laparoscope was inserted.  Patient was in head up and     rotated toward the left side.  Attention was focused in the right upper     quadrant.  Liver was  normal-appearing.  Gallbladder was tensely distended and     acutely inflamed appearing.  A laparoscopic needle aspirator was used to     aspirate bile from the gallbladder then I was able to decompress it so that I     could place a grasper on it and elevate it superiorly and toward the patient's     right shoulder.  Another grasper was placed on the infundibular gallbladder used    to apply lateral and inferior retraction.  The tissue in the triangle it was     very edematous and inflamed.  This area was mostly dissected with a laparoscopic    Kitner but also with full  cautery until I achieved what I felt was a     critical view of safety.  ICG green mapping was used to map the biliary tree and    the findings were consistent with my critical view of safety.  The cystic duct     was too large that I could securely occlude it with a clips I used a white load     of the endoscopic linear cutting stapler to staple across and divide the cystic     duct.  The artery was controlled between clips and then hook electrocautery was     used to cauterize the cholecysto hepatic attachments freeing the gallbladder     from the liver.  Once the gallbladder was freed it was placed in an Endo Catch     bag and removed from the abdomen and sent to pathology.  The gallbladder     contained multiple gallstones.  There were some areas of pinpoint bleeding along    the liver bed that stopped after cautery was applied.  The right upper quadrant     and gallbladder fossa was irrigated with sterile fluid and the irrigant was     suctioned.  There was adequate hemostasis.  There was no visible leakage of     bile.  The patient position flat the midline epigastric cannula fascial incision    was closed with an 0 Vicryl suture using the suture passer.  Pneumoperitoneum     was completely released and all the laparoscopic Chili was removed.  The skin     incisions were closed with buried absorbable suture followed by appropriate      dressings.  Patient did well during the procedure and was extubated and     transported to the recovery area in stable condition.            Gerardo Oreilly                   May 24, 2021 17:30      Electronically signed by Gerardo Oreilly  05/24/2021 17:30     Disclaimer: Converted hospital document may not contain table formatting or lab diagrams. Please see [employer] for authenticated document.

## 2021-06-08 ENCOUNTER — OFFICE VISIT (OUTPATIENT)
Dept: SURGERY | Facility: CLINIC | Age: 41
End: 2021-06-08

## 2021-06-08 VITALS — HEIGHT: 67 IN | BODY MASS INDEX: 35.06 KG/M2 | WEIGHT: 223.4 LBS

## 2021-06-08 DIAGNOSIS — Z09 FOLLOW UP: Primary | ICD-10-CM

## 2021-06-08 PROCEDURE — 99024 POSTOP FOLLOW-UP VISIT: CPT | Performed by: SURGERY

## 2021-06-08 NOTE — PROGRESS NOTES
"Chief Complaint  Post-op Follow-up    Subjective    No specific complaints    Objective     Vital Signs:  Ht 170.2 cm (67\")   Wt 101 kg (223 lb 6.4 oz)   BMI 34.99 kg/m²   Patient appears comfortable and is friendly.  Abdominal exam is benign.  Visions are all healing well.  Result Review :            Assessment and Plan  Patient is status post gallbladder removal.  Satisfactory postop progress.  Patient seems pleased with her progress.  No new issues to address.  Patient can see me PRN.    Gerardo Oreilly MD  06/08/2021    Electronically signed by Gerardo Oreilly MD, 06/08/21, 10:12 AM EDT.      "

## 2021-06-17 ENCOUNTER — PROCEDURE VISIT (OUTPATIENT)
Dept: NEUROLOGY | Facility: CLINIC | Age: 41
End: 2021-06-17

## 2021-06-17 DIAGNOSIS — M54.81 OCCIPITAL NEURALGIA OF RIGHT SIDE: Primary | ICD-10-CM

## 2021-06-17 PROCEDURE — 64405 NJX AA&/STRD GR OCPL NRV: CPT | Performed by: PSYCHIATRY & NEUROLOGY

## 2021-06-17 RX ORDER — IBUPROFEN 200 MG
CAPSULE ORAL
COMMUNITY
End: 2021-08-12

## 2021-06-17 RX ORDER — METHYLPREDNISOLONE ACETATE 40 MG/ML
20 INJECTION, SUSPENSION INTRA-ARTICULAR; INTRALESIONAL; INTRAMUSCULAR; SOFT TISSUE ONCE
Status: COMPLETED | OUTPATIENT
Start: 2021-06-17 | End: 2021-06-17

## 2021-06-17 RX ORDER — LIDOCAINE HYDROCHLORIDE 20 MG/ML
0.5 INJECTION, SOLUTION INFILTRATION; PERINEURAL ONCE
Status: COMPLETED | OUTPATIENT
Start: 2021-06-17 | End: 2021-06-17

## 2021-06-17 RX ADMIN — METHYLPREDNISOLONE ACETATE 20 MG: 40 INJECTION, SUSPENSION INTRA-ARTICULAR; INTRALESIONAL; INTRAMUSCULAR; SOFT TISSUE at 10:54

## 2021-06-17 RX ADMIN — LIDOCAINE HYDROCHLORIDE 0.5 ML: 20 INJECTION, SOLUTION INFILTRATION; PERINEURAL at 10:50

## 2021-06-17 NOTE — PROGRESS NOTES
"Procedure   Procedures    Nerve Block  Date/Time: 6/17/2021 10:14 AM  Performed by: Arnulfo Judd II, MD  Authorized by: Arnulfo Judd II, MD   Consent: Verbal consent obtained. Written consent obtained.  Risks and benefits: risks, benefits and alternatives were discussed  Consent given by: patient  Patient understanding: patient states understanding of the procedure being performed  Patient consent: the patient's understanding of the procedure matches consent given  Procedure consent: procedure consent matches procedure scheduled  Relevant documents: relevant documents present and verified  Required items: required blood products, implants, devices, and special equipment available  Patient identity confirmed: verbally with patient and provided demographic data  Time out: Immediately prior to procedure a \"time out\" was called to verify the correct patient, procedure, equipment, support staff and site/side marked as required.  Indications comments: occipital neuralgia  Body area: head  Nerve: greater occipital  Laterality: right    Sedation:  Patient sedated: no    Patient position: sitting  Needle size: 27 G  Location technique: anatomical landmarks  Patient tolerance: Patient tolerated the procedure well with no immediate complications  Comments: 0.5 mg of both Depo-Medrol and 2% Xylocaine without epinephrine were drawn into a 5 cc syringe.  The full 1 cc of this solution is injected at the site of the greater occipital nerve on the right.    Nerve Block     "

## 2021-07-07 ENCOUNTER — TRANSCRIBE ORDERS (OUTPATIENT)
Dept: ADMINISTRATIVE | Facility: HOSPITAL | Age: 41
End: 2021-07-07

## 2021-07-07 DIAGNOSIS — Z12.31 VISIT FOR SCREENING MAMMOGRAM: Primary | ICD-10-CM

## 2021-07-23 ENCOUNTER — TELEMEDICINE (OUTPATIENT)
Dept: NEUROLOGY | Facility: CLINIC | Age: 41
End: 2021-07-23

## 2021-07-23 DIAGNOSIS — M54.81 OCCIPITAL NEURALGIA OF RIGHT SIDE: Primary | ICD-10-CM

## 2021-07-23 DIAGNOSIS — G43.109 MIGRAINE WITH AURA AND WITHOUT STATUS MIGRAINOSUS, NOT INTRACTABLE: ICD-10-CM

## 2021-07-23 DIAGNOSIS — G93.5 CHIARI MALFORMATION TYPE I (HCC): ICD-10-CM

## 2021-07-23 DIAGNOSIS — M54.2 CHRONIC NECK PAIN: ICD-10-CM

## 2021-07-23 DIAGNOSIS — G89.29 CHRONIC NECK PAIN: ICD-10-CM

## 2021-07-23 PROCEDURE — 99213 OFFICE O/P EST LOW 20 MIN: CPT | Performed by: PSYCHIATRY & NEUROLOGY

## 2021-07-23 NOTE — PROGRESS NOTES
Chief complaint: History of occipital neuralgia    Patient ID: Valentina Anderson is a 41 y.o. female.    HPI:This was an audio and video enabled telemedicine encounter.  Patient did consent to this type of encounter.  She is at work and I am here in the neurology clinic.  I have had the pleasure of seeing Valentina today via telehealth medicine.  As you may know she is a 41-year-old female with a history of occipital neuralgia, bilateral.  She did receive improvement of the headache with the last occipital nerve block however she says that she is experiencing pain symptoms in the region of the lesser occipital nerves bilaterally.  It seems to be more focused on the right.  She denies any new neurological symptoms.  She has had physical therapy for occipital neuralgia in the past and was helpful.  She does exercises at home now.  No focal weakness or numbness.  She is experiencing some itching of her right scapula.  That is been a new issue.  She does also have a history of Chiari malformation and has had occipital decompressive surgery.    The following portions of the patient's history were reviewed and updated as appropriate: allergies, current medications, past family history, past medical history, past social history, past surgical history and problem list.    Review of Systems   I have reviewed the review of systems above performed by my medical assistant.      There were no vitals filed for this visit.    Neurologic Exam she is alert and oriented x3, no aphasia, no dysarthria, no facial droop    Physical Exam    Procedures    Assessment/Plan: We are going to schedule her for a follow-up occipital nerve block.  We will be doing the lesser occipital nerves bilaterally.  She will call and schedule.  We will see her back for that.  A total of 20 minutes was spent during this visit discussing signs and symptoms of occipital neuralgia, patient education, plan of care and prognosis.       Diagnoses and all orders for  this visit:    1. Occipital neuralgia of right side (Primary)    2. Chronic neck pain    3. Migraine with aura and without status migrainosus, not intractable    4. Chiari malformation type I (CMS/HCC)           Arnulfo Judd II, MD

## 2021-08-09 ENCOUNTER — HOSPITAL ENCOUNTER (OUTPATIENT)
Dept: MAMMOGRAPHY | Facility: HOSPITAL | Age: 41
Discharge: HOME | End: 2021-08-09
Admitting: NURSE PRACTITIONER

## 2021-08-09 DIAGNOSIS — Z12.31 VISIT FOR SCREENING MAMMOGRAM: ICD-10-CM

## 2021-08-09 PROCEDURE — 77067 SCR MAMMO BI INCL CAD: CPT

## 2021-08-09 PROCEDURE — 77063 BREAST TOMOSYNTHESIS BI: CPT | Performed by: RADIOLOGY

## 2021-08-09 PROCEDURE — 77067 SCR MAMMO BI INCL CAD: CPT | Performed by: RADIOLOGY

## 2021-08-09 PROCEDURE — 77063 BREAST TOMOSYNTHESIS BI: CPT

## 2021-08-12 ENCOUNTER — PROCEDURE VISIT (OUTPATIENT)
Dept: NEUROLOGY | Facility: CLINIC | Age: 41
End: 2021-08-12

## 2021-08-12 DIAGNOSIS — M54.81 OCCIPITAL NEURALGIA OF RIGHT SIDE: Primary | ICD-10-CM

## 2021-08-12 PROCEDURE — 64405 NJX AA&/STRD GR OCPL NRV: CPT | Performed by: PSYCHIATRY & NEUROLOGY

## 2021-08-12 RX ORDER — METHYLPREDNISOLONE ACETATE 40 MG/ML
40 INJECTION, SUSPENSION INTRA-ARTICULAR; INTRALESIONAL; INTRAMUSCULAR; SOFT TISSUE ONCE
Status: COMPLETED | OUTPATIENT
Start: 2021-08-12 | End: 2021-08-12

## 2021-08-12 RX ORDER — LIDOCAINE HYDROCHLORIDE 20 MG/ML
0.5 INJECTION, SOLUTION INFILTRATION; PERINEURAL ONCE
Status: COMPLETED | OUTPATIENT
Start: 2021-08-12 | End: 2021-08-12

## 2021-08-12 RX ORDER — CYCLOBENZAPRINE HCL 10 MG
10 TABLET ORAL 3 TIMES DAILY PRN
Qty: 30 TABLET | Refills: 3 | Status: SHIPPED | OUTPATIENT
Start: 2021-08-12 | End: 2021-09-11

## 2021-08-12 RX ADMIN — LIDOCAINE HYDROCHLORIDE 0.5 ML: 20 INJECTION, SOLUTION INFILTRATION; PERINEURAL at 08:45

## 2021-08-12 RX ADMIN — METHYLPREDNISOLONE ACETATE 40 MG: 40 INJECTION, SUSPENSION INTRA-ARTICULAR; INTRALESIONAL; INTRAMUSCULAR; SOFT TISSUE at 08:46

## 2021-08-12 NOTE — PROGRESS NOTES
"Procedure    Nerve Block  Date/Time: 8/12/2021 10:14 AM  Performed by: Arnulfo Judd II, MD  Authorized by: Arnulfo Judd II, MD   Consent: Verbal consent obtained. Written consent obtained.  Risks and benefits: risks, benefits and alternatives were discussed  Consent given by: patient  Patient understanding: patient states understanding of the procedure being performed  Patient consent: the patient's understanding of the procedure matches consent given  Procedure consent: procedure consent matches procedure scheduled  Relevant documents: relevant documents present and verified  Required items: required blood products, implants, devices, and special equipment available  Patient identity confirmed: verbally with patient and provided demographic data  Time out: Immediately prior to procedure a \"time out\" was called to verify the correct patient, procedure, equipment, support staff and site/side marked as required.  Indications comments: occipital neuralgia  Body area: head  Nerve: greater occipital  Laterality: right    Sedation:  Patient sedated: no    Patient position: sitting  Needle size: 27 G  Location technique: anatomical landmarks  Patient tolerance: Patient tolerated the procedure well with no immediate complications  Comments: 0.5 mg of both Depo-Medrol and 2% Xylocaine without epinephrine were drawn into a 5 cc syringe.  The full 1 cc of this solution is injected at the site of the greater occipital nerve on the right.  Procedures        Nerve Block     "

## 2021-08-19 ENCOUNTER — OFFICE VISIT (OUTPATIENT)
Dept: BARIATRICS/WEIGHT MGMT | Facility: CLINIC | Age: 41
End: 2021-08-19

## 2021-08-19 ENCOUNTER — TELEPHONE (OUTPATIENT)
Dept: NEUROLOGY | Facility: CLINIC | Age: 41
End: 2021-08-19

## 2021-08-19 VITALS
HEART RATE: 72 BPM | DIASTOLIC BLOOD PRESSURE: 78 MMHG | BODY MASS INDEX: 33.9 KG/M2 | HEIGHT: 67 IN | WEIGHT: 216 LBS | SYSTOLIC BLOOD PRESSURE: 114 MMHG | TEMPERATURE: 97.8 F

## 2021-08-19 DIAGNOSIS — F41.9 ANXIETY AND DEPRESSION: ICD-10-CM

## 2021-08-19 DIAGNOSIS — G89.29 CHRONIC NECK PAIN: Primary | ICD-10-CM

## 2021-08-19 DIAGNOSIS — E28.2 PCOS (POLYCYSTIC OVARIAN SYNDROME): ICD-10-CM

## 2021-08-19 DIAGNOSIS — E55.9 VITAMIN D DEFICIENCY: ICD-10-CM

## 2021-08-19 DIAGNOSIS — K31.7 MULTIPLE GASTRIC POLYPS: ICD-10-CM

## 2021-08-19 DIAGNOSIS — Z98.84 S/P LAPAROSCOPIC SLEEVE GASTRECTOMY: ICD-10-CM

## 2021-08-19 DIAGNOSIS — K21.9 CHRONIC GERD: ICD-10-CM

## 2021-08-19 DIAGNOSIS — M25.50 MULTIPLE JOINT PAIN: ICD-10-CM

## 2021-08-19 DIAGNOSIS — F32.A ANXIETY AND DEPRESSION: ICD-10-CM

## 2021-08-19 DIAGNOSIS — M54.2 CHRONIC NECK PAIN: Primary | ICD-10-CM

## 2021-08-19 DIAGNOSIS — E66.9 OBESITY, CLASS II, BMI 35-39.9: Primary | ICD-10-CM

## 2021-08-19 DIAGNOSIS — R53.82 CHRONIC FATIGUE: ICD-10-CM

## 2021-08-19 PROCEDURE — 99213 OFFICE O/P EST LOW 20 MIN: CPT | Performed by: NURSE PRACTITIONER

## 2021-08-19 NOTE — TELEPHONE ENCOUNTER
----- Message from Valentina Anderson sent at 8/19/2021 11:19 AM EDT -----  Regarding: Non-Urgent Medical Question  Contact: [phone]  Dr. Judd,  At my last visit (8/12, right occipital block), I know that we discussed some more physical therapy/massage therapy. I didn't see that in the visit note and had not received a call from any therapy locations. Do you know if this referral was sent?    Thank you!  Valentina Anderson

## 2021-08-19 NOTE — PROGRESS NOTES
[facility] BARIATRIC [facility] BARIATRIC SURGERY  [address] [facility] [address]  [facility] [address]  [phone]  [address] [address]  [phone]  Dept: [phone]  8/19/2021      Valentina Anderson.  [id number]  [id number]  1980  female      Chief Complaint   Patient presents with   • Follow-up     9 month  Sleeve       BH Post-Op Bariatric Surgery:   Valentina Anderson is status post Laparoscopic Sleeve/PEH procedure, performed on 11/4/2020     HPI:   Today's weight is 98 kg (216 lb) pounds, today's BMI is Body mass index is 33.39 kg/m².,@ has a  loss of 13 pounds since the last visit and@ weight loss since surgery is 87 pounds. The patient reports a decreased portion size and loss of appetite.      Valentina Anderson denies nausea, vomiting, reflux and reports that she is doing well     Diet and Exercise: Diet history reviewed and discussed with the patient. Weight loss/gains to date discussed with the patient. The patient states they are eating 60 grams of protein per day. She reports eating 3 meals per day, a typical portion size of 1/2 cup, eating 1 snacks per day, drinking 5-6 or more 8-oz. glasses of water per day, no carbonated beverage consumption. She is getting all of her protein from real food.     She goes into work at 6am. She eats at 7, 12, afternoon snack, she eats dinner at 5:30 and goes to bed at 8:30pm.     Supplements: bariatric MTV with iron and calcium.     Review of Systems   Constitutional: Positive for appetite change. Negative for fatigue and unexpected weight change.   HENT: Negative.    Eyes: Negative.    Respiratory: Negative.    Cardiovascular: Negative.  Negative for leg swelling.   Gastrointestinal: Negative for abdominal distention, abdominal pain, constipation, diarrhea, nausea and vomiting.   Genitourinary: Negative for difficulty urinating, frequency and urgency.   Musculoskeletal: Negative for back pain.   Skin: Negative.     Psychiatric/Behavioral: Negative.    All other systems reviewed and are negative.      Patient Active Problem List   Diagnosis   • Chiari malformation type I (CMS/HCC)   • Dizziness   • New daily persistent headache   • History of brain surgery   • Degeneration of intervertebral disc at C6-C7 level   • Bilateral occipital neuralgia   • Chronic fatigue   • Multiple joint pain   • Anxiety and depression   • Hypothyroid   • PCOS (polycystic ovarian syndrome)   • Chronic GERD   • Endometriosis   • Multiple gastric polyps   • S/P laparoscopic sleeve gastrectomy   • Dietary counseling   • Obesity, Class II, BMI 35-39.9   • Abnormal electrocardiogram   • Anxiety   • Vitamin D deficiency       Past Medical History:   Diagnosis Date   • Chiari I malformation (CMS/HCC)    • DDD (degenerative disc disease), cervical    • DDD (degenerative disc disease), cervical    • Disease of thyroid gland    • Dizziness    • Endometriosis    • GERD (gastroesophageal reflux disease)    • Headache    • Heart palpitations    • Hiatal hernia    • PONV (postoperative nausea and vomiting)        The following portions of the patient's history were reviewed and updated as appropriate: allergies, current medications, past family history, past medical history, past social history, past surgical history and problem list.    Vitals:    08/19/21 1513   BP: 114/78   Pulse: 72   Temp: 97.8 °F (36.6 °C)       Physical Exam  Vitals and nursing note reviewed.   Constitutional:       Appearance: She is well-developed.   Neck:      Thyroid: No thyromegaly.   Cardiovascular:      Rate and Rhythm: Normal rate and regular rhythm.      Heart sounds: Normal heart sounds.   Pulmonary:      Effort: Pulmonary effort is normal. No respiratory distress.      Breath sounds: Normal breath sounds. No wheezing.   Abdominal:      General: Bowel sounds are normal. There is no distension.      Palpations: Abdomen is soft.      Tenderness: There is no abdominal tenderness.  There is no guarding.      Hernia: No hernia is present.   Musculoskeletal:         General: No tenderness.   Skin:     General: Skin is warm and dry.      Findings: No erythema or rash.   Neurological:      Mental Status: She is alert.   Psychiatric:         Behavior: Behavior normal.         Assessment:   Post-op, the patient is doing well.     Encounter Diagnoses   Name Primary?   • Obesity, Class II, BMI 35-39.9 Yes   • S/P laparoscopic sleeve gastrectomy    • Vitamin D deficiency    • Chronic GERD    • Multiple gastric polyps    • Anxiety and depression    • Multiple joint pain    • Chronic fatigue    • PCOS (polycystic ovarian syndrome)        Plan:   Encouraged patient to be sure to get plenty of lean protein per day through small frequent meals all with a protein source.   Activity restrictions: none.   Recommended patient be sure to get at least 70 grams of protein per day by eating small, frequent meals all with high lean protein choices. Be sure to limit/cut back on daily carbohydrate intake. Discussed with the patient the recommended amount of water per day to intake- half of body weight in ounces. Reviewed vitamin requirements. Be sure to do routine exercise, 150 minutes per week minimum, including both cardio and strength training.     Instructions / Recommendations: dietary counseling recommended, recommended a daily protein intake of  grams, vitamin supplement(s) recommended, recommended exercising at least 150 minutes per week, behavior modifications recommended and instructed to call the office for concerns, questions, or problems.     The patient was instructed to follow up in 3 months .     Total time spent during this encounter today was 25 minutes

## 2021-09-07 ENCOUNTER — TRANSCRIBE ORDERS (OUTPATIENT)
Dept: LAB | Facility: HOSPITAL | Age: 41
End: 2021-09-07

## 2021-09-08 ENCOUNTER — TRANSCRIBE ORDERS (OUTPATIENT)
Dept: LAB | Facility: HOSPITAL | Age: 41
End: 2021-09-08

## 2021-09-08 ENCOUNTER — LAB (OUTPATIENT)
Dept: LAB | Facility: HOSPITAL | Age: 41
End: 2021-09-08

## 2021-09-08 DIAGNOSIS — Z00.00 ROUTINE GENERAL MEDICAL EXAMINATION AT A HEALTH CARE FACILITY: ICD-10-CM

## 2021-09-08 DIAGNOSIS — Z00.00 ROUTINE GENERAL MEDICAL EXAMINATION AT A HEALTH CARE FACILITY: Primary | ICD-10-CM

## 2021-09-08 PROCEDURE — C9803 HOPD COVID-19 SPEC COLLECT: HCPCS

## 2021-09-08 PROCEDURE — U0004 COV-19 TEST NON-CDC HGH THRU: HCPCS

## 2021-09-09 LAB — SARS-COV-2 RNA NOSE QL NAA+PROBE: NOT DETECTED

## 2021-09-28 ENCOUNTER — TELEPHONE (OUTPATIENT)
Dept: NEUROLOGY | Facility: CLINIC | Age: 41
End: 2021-09-28

## 2021-09-28 DIAGNOSIS — G89.29 CHRONIC NECK PAIN: Primary | ICD-10-CM

## 2021-09-28 DIAGNOSIS — M54.2 CHRONIC NECK PAIN: Primary | ICD-10-CM

## 2021-09-28 NOTE — TELEPHONE ENCOUNTER
Provider: JANEL  Caller: JUAN JOSÉ  Relationship to Patient: SELF  Pharmacy: [facility] IN [place]   Phone Number: [phone]  Reason for Call: PT CALLED IN ASKING IF THERE IS SOMETHING SHE COULD TAKE THAT IS LESS SEDATIVE OTHER THAN cyclobenzaprine (FLEXERIL) 10 MG tablet. SHE STATES THAT WHEN SHE TAKES THEM SHE SLEEPS WITH NO PROBLEM BUT SHE IS NOT SURE THEY ARE WORKING. PLEASE ADVISE.

## 2021-09-29 RX ORDER — TIZANIDINE HYDROCHLORIDE 6 MG/1
6 CAPSULE, GELATIN COATED ORAL 3 TIMES DAILY PRN
Qty: 20 CAPSULE | Refills: 3 | Status: SHIPPED | OUTPATIENT
Start: 2021-09-29 | End: 2022-07-28

## 2021-10-02 ENCOUNTER — FLU SHOT (OUTPATIENT)
Dept: INTERNAL MEDICINE | Facility: CLINIC | Age: 41
End: 2021-10-02

## 2021-10-02 DIAGNOSIS — Z23 NEED FOR INFLUENZA VACCINATION: Primary | ICD-10-CM

## 2021-10-02 PROCEDURE — 90686 IIV4 VACC NO PRSV 0.5 ML IM: CPT | Performed by: INTERNAL MEDICINE

## 2021-10-02 PROCEDURE — 90471 IMMUNIZATION ADMIN: CPT | Performed by: INTERNAL MEDICINE

## 2021-10-04 ENCOUNTER — TREATMENT (OUTPATIENT)
Dept: PHYSICAL THERAPY | Facility: CLINIC | Age: 41
End: 2021-10-04

## 2021-10-04 DIAGNOSIS — M54.2 CHRONIC NECK PAIN: Primary | ICD-10-CM

## 2021-10-04 DIAGNOSIS — R29.898 NECK TIGHTNESS: ICD-10-CM

## 2021-10-04 DIAGNOSIS — R29.3 POOR POSTURE: ICD-10-CM

## 2021-10-04 DIAGNOSIS — G89.29 CHRONIC NECK PAIN: Primary | ICD-10-CM

## 2021-10-04 PROCEDURE — 97161 PT EVAL LOW COMPLEX 20 MIN: CPT | Performed by: PHYSICAL THERAPIST

## 2021-10-04 PROCEDURE — 97110 THERAPEUTIC EXERCISES: CPT | Performed by: PHYSICAL THERAPIST

## 2021-10-04 NOTE — PROGRESS NOTES
Physical Therapy Initial Evaluation and Plan of Care        Patient: Valentina Anderson   : 1980  Diagnosis/ICD-10 Code:  Chronic neck pain [M54.2, G89.29]  Referring practitioner: Arnulfo Judd II, MD  Date of Initial Visit: 10/4/2021  Today's Date: 10/4/2021  Patient seen for 1 sessions           Subjective Questionnaire: NDI:      Subjective Evaluation    History of Present Illness  Mechanism of injury: Patient reports that she started having neck issues earlier this year.  Patient reports that she had 2 injections which did help with pain and radicular symptoms. Patient reports that she started having spasms in the R shoulder several weeks ago which have worsened and gotten so intense she had to call off work.  Patient reports increased stress at work.  Patient reports that she is getting pain in right lateral elbow.  Pt reports no numbness/tingling symptoms.  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  Patient reports prolonged sitting at a chair with no back this weekend which increased her pain.  Patient reports that pain wakes her up at night intermittently.     Pain  Current pain ratin  At best pain ratin  At worst pain rating: 10  Quality: dull ache, pulling and discomfort  Aggravating factors: sleeping and prolonged positioning  Progression: worsening    Social Support  Lives with: spouse and young children    Treatments  Previous treatment: physical therapy  Patient Goals  Patient goals for therapy: decreased pain, improved balance, increased motion, increased strength, independence with ADLs/IADLs and return to sport/leisure activities             Objective          Static Posture     Head  Forward.    Shoulders  Rounded.    Scapulae  Left protracted and right protracted.    Lumbar Spine   Increased lordosis.     Palpation   Left   Tenderness of the middle trapezius, rhomboids, scalenes,  suboccipitals and upper trapezius.     Right Tenderness of the middle trapezius, rhomboids, scalenes, suboccipitals and upper trapezius.   Trigger point to suboccipitals.     Tenderness   Cervical Spine   Tenderness in the spinous process.     Right Elbow   Tenderness in the lateral epicondyle.     Right Wrist/Hand   Tenderness in the lateral epicondyle.     Additional Tenderness Details  Patient demonstrates hypomobility of C3-C7 during PA glides    Neurological Testing     Sensation   Cervical/Thoracic   Left   Intact: light touch    Right   Intact: light touch    Comments   Right light touch: bilateral UE light touch intact.     Active Range of Motion   Cervical/Thoracic Spine   Cervical    Flexion: 25 degrees   Extension: 21 degrees with pain  Left rotation: 45 degrees with pain  Right rotation: 40 degrees with pain  Left Shoulder   Normal active range of motion    Right Shoulder   Normal active range of motion    Strength/Myotome Testing     Left Shoulder     Planes of Motion   Flexion: 4   Extension: 4   Abduction: 4   External rotation at 0°: 4-   Internal rotation at 0°: 4     Right Shoulder     Planes of Motion   Flexion: 4   Extension: 4   Abduction: 4   External rotation at 0°: 4-   Internal rotation at 0°: 4     Left Elbow   Flexion: 4+  Extension: 4+    Right Elbow   Flexion: 4+  Extension: 4+    Left Wrist/Hand   Wrist extension: 4+  Wrist flexion: 4+    Right Wrist/Hand   Wrist extension: 4+  Wrist flexion: 4+          Assessment & Plan     Assessment  Impairments: abnormal muscle firing, abnormal muscle tone, abnormal or restricted ROM, activity intolerance, impaired physical strength, lacks appropriate home exercise program, pain with function and safety issue  Assessment details: Patient presents with signs/symptoms consistent with cervical pain without radiculopathy including decreased cervical ROM, bilateral scapular and postural weakness and reports of pain limiting function during ADLs as shown  on the NDI. Patient would benefit from skilled PT services in order to address remaining deficits limiting function at this time.  Prognosis: good  Functional Limitations: lifting, sleeping, pulling, pushing, uncomfortable because of pain, sitting and unable to perform repetitive tasks  Goals  Plan Goals: 1. The patient has limited ROM.    LTG 1: 12 weeks:  The patient will demonstrate 70° of bilateral cervical spine rotation, 35° of cervical flexion, and 35° of cervical extension in order to adequately monitor blind spots while driving and improve ability to perform activities of daily living.    STATUS:  New   TREATMENT:  Manual therapy, therapeutic exercise, home exercise instruction, cervical traction, and modalities as needed to include: moist heat, electrical stimulation, and ultrasound.     2. The patient has complaints of pain.   LTG 2: 12 weeks:  The patient will report 1/10 pain in order to more easily tolerate activities of daily living and improve sleep quality.    STATUS:  New   STG 2a: 6 weeks:  The patient will report 3/10 pain.    STATUS:  New   TREATMENT: Manual therapy, therapeutic exercise, home exercise instruction, cervical traction, and modalities as needed to include: moist heat, electrical stimulation, and ultrasound.    3. Carrying, Moving, and Handling Objects Functional Limitation     LTG 3: 12 weeks:  The patient will demonstrate 1-19% limitation by achieving a score of 5/50 on the Neck Disability Index.    STATUS:  New   TREATMENT:  Manual therapy, therapeutic exercise, home exercise instruction, and modalities as needed to include: moist heat, electrical stimulation, and ultrasound.    Plan  Therapy options: will be seen for skilled physical therapy services  Planned modality interventions: cryotherapy, electrical stimulation/Russian stimulation, TENS and traction  Planned therapy interventions: balance/weight-bearing training, ADL retraining, soft tissue mobilization, strengthening,  stretching, therapeutic activities, joint mobilization, home exercise program, functional ROM exercises, flexibility, body mechanics training, postural training, neuromuscular re-education, manual therapy, motor coordination training and spinal/joint mobilization  Frequency: 2x week  Duration in weeks: 12  Treatment plan discussed with: patient        Timed:         Manual Therapy:    10     mins  52051;     Therapeutic Exercise:    0     mins  39334;     Neuromuscular Geoff:    0    mins  71778;    Therapeutic Activity:     0     mins  93826;     Gait Trainin     mins  12349;     Ultrasound:     0     mins  61034;    Ionto                               0    mins   85739  Self-care  __0__ mins 15527    Un-Timed:  Electrical Stimulation:    0     mins  33248 ( );  Traction     0     mins 16891  Low Eval     35     Mins  97879  Mod Eval     0     Mins  13613  High Eval                       0     Mins  76183      Timed Treatment:   10   mins   Total Treatment:     45   mins    PT SIGNATURE: Mikayla Leiva PT   DATE TREATMENT INITIATED: 10/4/2021    Initial Certification  Certification Period: 2022  I certify that the therapy services are furnished while this patient is under my care.  The services outlined above are required by this patient, and will be reviewed every 90 days.     PHYSICIAN: Arnulfo Judd II, MD      DATE:     Please sign and return via fax to [phone].. Thank you, [facility].

## 2021-10-12 ENCOUNTER — TREATMENT (OUTPATIENT)
Dept: PHYSICAL THERAPY | Facility: CLINIC | Age: 41
End: 2021-10-12

## 2021-10-12 DIAGNOSIS — M54.2 CHRONIC NECK PAIN: Primary | ICD-10-CM

## 2021-10-12 DIAGNOSIS — R29.3 POOR POSTURE: ICD-10-CM

## 2021-10-12 DIAGNOSIS — R29.898 NECK TIGHTNESS: ICD-10-CM

## 2021-10-12 DIAGNOSIS — G89.29 CHRONIC NECK PAIN: Primary | ICD-10-CM

## 2021-10-12 PROCEDURE — 97140 MANUAL THERAPY 1/> REGIONS: CPT | Performed by: PHYSICAL THERAPIST

## 2021-10-12 PROCEDURE — 97110 THERAPEUTIC EXERCISES: CPT | Performed by: PHYSICAL THERAPIST

## 2021-10-12 NOTE — PROGRESS NOTES
"Physical Therapy Daily Progress Note        Patient: Valentina Anderson   : 1980  Diagnosis/ICD-10 Code:  Chronic neck pain [M54.2, G89.29]  Referring practitioner: Arnulfo Judd II, MD  Date of Initial Visit: Type: THERAPY  Noted: 10/4/2021  Today's Date: 10/12/2021  Patient seen for 2 sessions             Subjective   Valentina Anderson reports having increased pain in her neck- rating her pain at 8/10 upon arrival.  She states that she was unable to sleep last night secondary to pain.  She states, \"My neck and back have been in spasms all day\".    Objective     Cervical Rotation ROM to the right most limited today    + Tightness and Trigger points at bilateral Upper Trap/Levator Scapula musculature right greater than     left        See Exercise and Manual Logs for complete treatment.       Assessment/Plan       Pt tolerated therapy session moderately well - with initiation of therapeutic exercises and Manual therapy. She continues to have deficits in Cervical ROM,  Strength, Stability, and Pain; limiting function and ability to perform ADLs at this time.  She responded well to manual therapy - reporting decrease in pain from 8/10 pre session to 4/10 post session.    Progress per Plan of Care           Timed:  Manual Therapy:    15     mins  50090;  Therapeutic Exercise:    23     mins  56469;     Neuromuscular Geoff:    0    mins  73450;    Therapeutic Activity:     0     mins  03836;     Gait Trainin     mins  95210;     Ultrasound:     0     mins  52239;    Electrical Stimulation:    0     mins  82946;  Iontophoresis     0     mins  62688    Untimed:  Electrical Stimulation:    0     mins  51341 ( );  Mechanical Traction:    0     mins  00064;   Fluidotherapy     0     mins  45694  Hot pack     0     mins  81572  Cold pack     0     mins  84659    Timed Treatment:   38   mins   Total Treatment:     38   mins        Yoselin Patricio PTA  Physical Therapist Assistant  "

## 2021-10-14 ENCOUNTER — TREATMENT (OUTPATIENT)
Dept: PHYSICAL THERAPY | Facility: CLINIC | Age: 41
End: 2021-10-14

## 2021-10-14 DIAGNOSIS — R29.3 POOR POSTURE: ICD-10-CM

## 2021-10-14 DIAGNOSIS — M54.2 CHRONIC NECK PAIN: Primary | ICD-10-CM

## 2021-10-14 DIAGNOSIS — G89.29 CHRONIC NECK PAIN: Primary | ICD-10-CM

## 2021-10-14 DIAGNOSIS — R29.898 NECK TIGHTNESS: ICD-10-CM

## 2021-10-14 PROCEDURE — 97110 THERAPEUTIC EXERCISES: CPT | Performed by: PHYSICAL THERAPIST

## 2021-10-14 PROCEDURE — 97140 MANUAL THERAPY 1/> REGIONS: CPT | Performed by: PHYSICAL THERAPIST

## 2021-10-14 NOTE — PROGRESS NOTES
"Physical Therapy Daily Progress Note        Patient: Valentina Anderson   : 1980  Diagnosis/ICD-10 Code:  Chronic neck pain [M54.2, G89.29]  Referring practitioner: Arnulfo Judd II, MD  Date of Initial Visit: Type: THERAPY  Noted: 10/4/2021  Today's Date: 10/14/2021  Patient seen for 3 sessions             Subjective   Valentina Anderson reports that she has been feeling better today.  She rates her pain at 2/10 upon arrival today.    Objective     Cervical Rotation ROM to the right most limited today- \"Pinching\" pain reported with this motion on right     + Tightness and Trigger points at bilateral Upper Trap/Levator Scapula musculature right greater than     left       See Exercise and Manual Logs for complete treatment.       Assessment/Plan     Pt tolerated therapy session well today with progression of therapeutic exercises and Manual therapy. She has improved, but continues to have deficits in Cervical ROM,  Strength, Stability, and Pain; limiting function and ability to perform ADLs at this time.  She continues to respond well to manual therapy - reporting decrease in pain post and between sessions.    Progress per Plan of Care           Timed:  Manual Therapy:    15     mins  43709;  Therapeutic Exercise:    23     mins  02583;     Neuromuscular Geoff:    0    mins  24073;    Therapeutic Activity:     0     mins  77734;     Gait Trainin     mins  63285;     Ultrasound:     0     mins  88823;    Electrical Stimulation:    0     mins  18079;  Iontophoresis     0     mins  45780    Untimed:  Electrical Stimulation:    0     mins  85457 ( );  Mechanical Traction:    0     mins  35939;   Fluidotherapy     0     mins  22938  Hot pack     0     mins  69166  Cold pack     0     mins  17781    Timed Treatment:   38   mins   Total Treatment:     38   mins        Yoselin Patricio PTA  Physical Therapist Assistant  "

## 2021-10-19 ENCOUNTER — TREATMENT (OUTPATIENT)
Dept: PHYSICAL THERAPY | Facility: CLINIC | Age: 41
End: 2021-10-19

## 2021-10-19 DIAGNOSIS — R29.898 NECK TIGHTNESS: ICD-10-CM

## 2021-10-19 DIAGNOSIS — M54.2 CHRONIC NECK PAIN: Primary | ICD-10-CM

## 2021-10-19 DIAGNOSIS — R29.3 POOR POSTURE: ICD-10-CM

## 2021-10-19 DIAGNOSIS — G89.29 CHRONIC NECK PAIN: Primary | ICD-10-CM

## 2021-10-19 PROCEDURE — 97110 THERAPEUTIC EXERCISES: CPT | Performed by: PHYSICAL THERAPIST

## 2021-10-19 PROCEDURE — 97140 MANUAL THERAPY 1/> REGIONS: CPT | Performed by: PHYSICAL THERAPIST

## 2021-10-19 NOTE — PROGRESS NOTES
"Physical Therapy Daily Progress Note        Patient: Valentina Anderson   : 1980  Diagnosis/ICD-10 Code:  Chronic neck pain [M54.2, G89.29]  Referring practitioner: Arnulfo Judd II, MD  Date of Initial Visit: No linked episodes  Today's Date: 10/19/2021  Patient seen for Visit count could not be calculated. Make sure you are using a visit which is associated with an episode. sessions             Subjective  Valentina Anderson reports - \"I feel great!\"  She states that she has not had any pain since .    Objective     Active Range of Motion    Cervical     Flexion: 30 degrees   Extension: 35 degrees   Left rotation: 65 degrees  Right rotation: 55 degrees with pain  Left Lateral Flexion:  30 degrees  Right Lateral Flexion:  30 degrees      See Exercise and Manual Logs for complete treatment.       Assessment/Plan     Pt tolerated therapy session well today with progression of therapeutic exercises and Manual therapy. She has improved, but continues to have deficits in Cervical ROM ,  Strength, Stability, and intermittent Pain; limiting function and ability to perform ADLs at this time.  She continues to respond well to manual therapy - reporting decrease in pain post and between sessions.  Significant improvement in Cervical AROM with less pain reported.       Progress per Plan of Care           Timed:  Manual Therapy:    15     mins  89227;  Therapeutic Exercise:    23     mins  27395;     Neuromuscular Geoff:    0    mins  99294;    Therapeutic Activity:     0     mins  52509;     Gait Trainin     mins  79454;     Ultrasound:     0     mins  07596;    Electrical Stimulation:    0     mins  28162;  Iontophoresis     0     mins  77482    Untimed:  Electrical Stimulation:    0     mins  25742 (MC );  Mechanical Traction:    0     mins  29485;   Fluidotherapy     0     mins  83252  Hot pack     0     mins  93268  Cold pack     0     mins  38405    Timed Treatment:   38   mins   Total Treatment:    "  38   mins        Yoselin Patricio, RICCARDO  Physical Therapist Assistant

## 2021-10-21 ENCOUNTER — TREATMENT (OUTPATIENT)
Dept: PHYSICAL THERAPY | Facility: CLINIC | Age: 41
End: 2021-10-21

## 2021-10-21 DIAGNOSIS — G89.29 CHRONIC NECK PAIN: Primary | ICD-10-CM

## 2021-10-21 DIAGNOSIS — R29.898 NECK TIGHTNESS: ICD-10-CM

## 2021-10-21 DIAGNOSIS — R29.3 POOR POSTURE: ICD-10-CM

## 2021-10-21 DIAGNOSIS — M54.2 CHRONIC NECK PAIN: Primary | ICD-10-CM

## 2021-10-21 PROCEDURE — 97140 MANUAL THERAPY 1/> REGIONS: CPT | Performed by: PHYSICAL THERAPIST

## 2021-10-21 PROCEDURE — 97110 THERAPEUTIC EXERCISES: CPT | Performed by: PHYSICAL THERAPIST

## 2021-10-21 NOTE — PROGRESS NOTES
Physical Therapy Daily Progress Note        Patient: Valentina Anderson   : 1980  Diagnosis/ICD-10 Code:  Chronic neck pain [M54.2, G89.29]  Referring practitioner: Arnulfo Judd II, MD  Date of Initial Visit: No linked episodes  Today's Date: 10/21/2021  Patient seen for Visit count could not be calculated. Make sure you are using a visit which is associated with an episode. sessions             Subjective   Valentina Anderson reports that she continues to feel much better overall.  She does however, report having some neck tightness and  discomfort at mid thoracic area- right greater than left today.    Objective     Bilateral External Rotator Strength:  Grossly 4-/5        See Exercise and Manual Logs for complete treatment.       Assessment/Plan        Pt tolerated therapy session well today with progression of therapeutic exercises and Manual therapy. She has improved, but continues to have deficits in Cervical ROM ,  Strength, Stability, and intermittent Pain; limiting function and ability to perform ADLs at this time.  She continues to respond well to manual therapy - reporting decrease in pain and tightness post and between sessions.  Significant improvement in Cervical AROM with less pain reported.  Pt instructed in Thoracic extension stretch with use of foam roll for HEP.  Pt returned/demos good understanding.           Progress per Plan of Care           Timed:  Manual Therapy:    13     mins  94903;  Therapeutic Exercise:    25         mins  00969;     Neuromuscular Geoff:    0    mins  74163;    Therapeutic Activity:     0     mins  67949;     Gait Trainin     mins  49760;     Ultrasound:     0     mins  27372;    Electrical Stimulation:    0     mins  30234;  Iontophoresis     0     mins  35951    Untimed:  Electrical Stimulation:    0     mins  53110 ( );  Mechanical Traction:    0     mins  26169;   Fluidotherapy     0     mins  35915  Hot pack     0     mins  81726  Cold pack     0      mins  73811    Timed Treatment:   38   mins   Total Treatment:     38   mins        Yoselin Patricio PTA  Physical Therapist Assistant

## 2021-10-25 ENCOUNTER — LAB (OUTPATIENT)
Dept: LAB | Facility: HOSPITAL | Age: 41
End: 2021-10-25

## 2021-10-25 ENCOUNTER — TRANSCRIBE ORDERS (OUTPATIENT)
Dept: LAB | Facility: HOSPITAL | Age: 41
End: 2021-10-25

## 2021-10-25 DIAGNOSIS — I10 ESSENTIAL HYPERTENSION, MALIGNANT: ICD-10-CM

## 2021-10-25 DIAGNOSIS — D51.8 VITAMIN B12 DEFICIENCY (DIETARY) ANEMIA: Primary | ICD-10-CM

## 2021-10-25 DIAGNOSIS — E03.9 HYPOTHYROIDISM, UNSPECIFIED TYPE: ICD-10-CM

## 2021-10-25 DIAGNOSIS — D51.8 VITAMIN B12 DEFICIENCY (DIETARY) ANEMIA: ICD-10-CM

## 2021-10-25 DIAGNOSIS — G43.909 MIGRAINE WITHOUT STATUS MIGRAINOSUS, NOT INTRACTABLE, UNSPECIFIED MIGRAINE TYPE: ICD-10-CM

## 2021-10-25 LAB
25(OH)D3 SERPL-MCNC: 76.1 NG/ML (ref 30–100)
ALBUMIN SERPL-MCNC: 4 G/DL (ref 3.5–5.2)
ALBUMIN/GLOB SERPL: 1.4 G/DL
ALP SERPL-CCNC: 46 U/L (ref 39–117)
ALT SERPL W P-5'-P-CCNC: 14 U/L (ref 1–33)
ANION GAP SERPL CALCULATED.3IONS-SCNC: 9.3 MMOL/L (ref 5–15)
AST SERPL-CCNC: 15 U/L (ref 1–32)
BASOPHILS # BLD AUTO: 0.05 10*3/MM3 (ref 0–0.2)
BASOPHILS NFR BLD AUTO: 0.9 % (ref 0–1.5)
BILIRUB SERPL-MCNC: 0.3 MG/DL (ref 0–1.2)
BUN SERPL-MCNC: 9 MG/DL (ref 6–20)
BUN/CREAT SERPL: 9.8 (ref 7–25)
CALCIUM SPEC-SCNC: 9.2 MG/DL (ref 8.6–10.5)
CHLORIDE SERPL-SCNC: 108 MMOL/L (ref 98–107)
CHOLEST SERPL-MCNC: 126 MG/DL (ref 0–200)
CO2 SERPL-SCNC: 25.7 MMOL/L (ref 22–29)
CREAT SERPL-MCNC: 0.92 MG/DL (ref 0.57–1)
DEPRECATED RDW RBC AUTO: 42.9 FL (ref 37–54)
EOSINOPHIL # BLD AUTO: 0.19 10*3/MM3 (ref 0–0.4)
EOSINOPHIL NFR BLD AUTO: 3.4 % (ref 0.3–6.2)
ERYTHROCYTE [DISTWIDTH] IN BLOOD BY AUTOMATED COUNT: 13 % (ref 12.3–15.4)
GFR SERPL CREATININE-BSD FRML MDRD: 67 ML/MIN/1.73
GLOBULIN UR ELPH-MCNC: 2.8 GM/DL
GLUCOSE SERPL-MCNC: 85 MG/DL (ref 65–99)
HCT VFR BLD AUTO: 42 % (ref 34–46.6)
HDLC SERPL-MCNC: 42 MG/DL (ref 40–60)
HGB BLD-MCNC: 14 G/DL (ref 12–15.9)
IMM GRANULOCYTES # BLD AUTO: 0.01 10*3/MM3 (ref 0–0.05)
IMM GRANULOCYTES NFR BLD AUTO: 0.2 % (ref 0–0.5)
LDLC SERPL CALC-MCNC: 71 MG/DL (ref 0–100)
LDLC/HDLC SERPL: 1.72 {RATIO}
LYMPHOCYTES # BLD AUTO: 1.89 10*3/MM3 (ref 0.7–3.1)
LYMPHOCYTES NFR BLD AUTO: 33.5 % (ref 19.6–45.3)
MCH RBC QN AUTO: 30.4 PG (ref 26.6–33)
MCHC RBC AUTO-ENTMCNC: 33.3 G/DL (ref 31.5–35.7)
MCV RBC AUTO: 91.3 FL (ref 79–97)
MONOCYTES # BLD AUTO: 0.4 10*3/MM3 (ref 0.1–0.9)
MONOCYTES NFR BLD AUTO: 7.1 % (ref 5–12)
NEUTROPHILS NFR BLD AUTO: 3.1 10*3/MM3 (ref 1.7–7)
NEUTROPHILS NFR BLD AUTO: 54.9 % (ref 42.7–76)
NRBC BLD AUTO-RTO: 0 /100 WBC (ref 0–0.2)
PLATELET # BLD AUTO: 290 10*3/MM3 (ref 140–450)
PMV BLD AUTO: 10.2 FL (ref 6–12)
POTASSIUM SERPL-SCNC: 4.3 MMOL/L (ref 3.5–5.2)
PROT SERPL-MCNC: 6.8 G/DL (ref 6–8.5)
RBC # BLD AUTO: 4.6 10*6/MM3 (ref 3.77–5.28)
SODIUM SERPL-SCNC: 143 MMOL/L (ref 136–145)
TRIGL SERPL-MCNC: 58 MG/DL (ref 0–150)
TSH SERPL DL<=0.05 MIU/L-ACNC: 2.23 UIU/ML (ref 0.27–4.2)
VIT B12 BLD-MCNC: 617 PG/ML (ref 211–946)
VLDLC SERPL-MCNC: 13 MG/DL (ref 5–40)
WBC # BLD AUTO: 5.64 10*3/MM3 (ref 3.4–10.8)

## 2021-10-25 PROCEDURE — 36415 COLL VENOUS BLD VENIPUNCTURE: CPT

## 2021-10-25 PROCEDURE — 84443 ASSAY THYROID STIM HORMONE: CPT

## 2021-10-25 PROCEDURE — 82607 VITAMIN B-12: CPT

## 2021-10-25 PROCEDURE — 84482 T3 REVERSE: CPT

## 2021-10-25 PROCEDURE — 80061 LIPID PANEL: CPT

## 2021-10-25 PROCEDURE — 80053 COMPREHEN METABOLIC PANEL: CPT

## 2021-10-25 PROCEDURE — 85025 COMPLETE CBC W/AUTO DIFF WBC: CPT

## 2021-10-25 PROCEDURE — 82306 VITAMIN D 25 HYDROXY: CPT

## 2021-10-26 ENCOUNTER — TREATMENT (OUTPATIENT)
Dept: PHYSICAL THERAPY | Facility: CLINIC | Age: 41
End: 2021-10-26

## 2021-10-26 DIAGNOSIS — M54.2 CHRONIC NECK PAIN: Primary | ICD-10-CM

## 2021-10-26 DIAGNOSIS — R29.3 POOR POSTURE: ICD-10-CM

## 2021-10-26 DIAGNOSIS — G89.29 CHRONIC NECK PAIN: Primary | ICD-10-CM

## 2021-10-26 PROCEDURE — 97110 THERAPEUTIC EXERCISES: CPT | Performed by: PHYSICAL THERAPIST

## 2021-10-26 PROCEDURE — 97014 ELECTRIC STIMULATION THERAPY: CPT | Performed by: PHYSICAL THERAPIST

## 2021-10-26 PROCEDURE — 97140 MANUAL THERAPY 1/> REGIONS: CPT | Performed by: PHYSICAL THERAPIST

## 2021-10-26 NOTE — PROGRESS NOTES
"Physical Therapy Daily Progress Note        Patient: Valentina Anderson   : 1980  Diagnosis/ICD-10 Code:  Chronic neck pain [M54.2, G89.29]  Referring practitioner: Arnulfo Judd II, MD  Date of Initial Visit: Type: THERAPY  Noted: 10/4/2021  Today's Date: 10/26/2021  Patient seen for 6 sessions             Subjective   Valentina Anderson reports having increased pain since Saturday- She rates her cervical and interscapular pain at 4/10 upon arrival today.  She reports having helped at a \"[employer]\" for approximately 2 hours on Saturday- stating that she just felt like she over-did-it. She reports taking muscle relaxers and icing to help manage pain.    Objective     +Tightness and tenderness to palpation at bilateral upper Trap/Levator scap musculature as well as interscapular musculature - right greater than left.    See Exercise, Manual, and Modality Logs for complete treatment.       Assessment/Plan     Pt tolerated therapy session moderately well today with performance of therapeutic exercises and Manual therapy. She has improved, but continues to have deficits in Cervical ROM , Strength, Stability, and intermittent Pain; limiting function and ability to perform ADLs at this time.  She did report exacerbation of symptoms after increase in functional activity this weekend; but, continues to respond well to manual therapy and trial of Modalities.  She reported decrease in pain and tightness post session after IFC/Ice trial in clinic today.       Progress per Plan of Care           Timed:  Manual Therapy:    10     mins  24034;  Therapeutic Exercise:    20     mins  46198;     Neuromuscular Geoff:    0    mins  41087;    Therapeutic Activity:     0     mins  35828;     Gait Trainin     mins  04005;     Ultrasound:     0     mins  02006;    Electrical Stimulation:    0     mins  37892;  Iontophoresis     0     mins  02277    Untimed:  Electrical Stimulation:    15     mins  96666 (MC " );  Mechanical Traction:    0     mins  91998;   Fluidotherapy     0     mins  72685  Hot pack     0     mins  93713  Cold pack     0     mins  40062    Timed Treatment:   30   mins   Total Treatment:     45   mins        Yoselin Patricio PTA  Physical Therapist Assistant

## 2021-10-28 LAB — T3REVERSE SERPL-MCNC: 19.9 NG/DL (ref 9.2–24.1)

## 2021-11-01 ENCOUNTER — TREATMENT (OUTPATIENT)
Dept: PHYSICAL THERAPY | Facility: CLINIC | Age: 41
End: 2021-11-01

## 2021-11-01 DIAGNOSIS — R29.898 NECK TIGHTNESS: ICD-10-CM

## 2021-11-01 DIAGNOSIS — M54.2 CHRONIC NECK PAIN: Primary | ICD-10-CM

## 2021-11-01 DIAGNOSIS — M77.11 LATERAL EPICONDYLITIS OF RIGHT ELBOW: ICD-10-CM

## 2021-11-01 DIAGNOSIS — R29.3 POOR POSTURE: ICD-10-CM

## 2021-11-01 DIAGNOSIS — G89.29 CHRONIC NECK PAIN: Primary | ICD-10-CM

## 2021-11-01 PROCEDURE — 97014 ELECTRIC STIMULATION THERAPY: CPT | Performed by: PHYSICAL THERAPIST

## 2021-11-01 PROCEDURE — 97110 THERAPEUTIC EXERCISES: CPT | Performed by: PHYSICAL THERAPIST

## 2021-11-01 PROCEDURE — 97140 MANUAL THERAPY 1/> REGIONS: CPT | Performed by: PHYSICAL THERAPIST

## 2021-11-01 NOTE — PROGRESS NOTES
Progress note    Patient: Valentina Anderson   : 1980  Diagnosis/ICD-10 Code:  Chronic neck pain [M54.2, G89.29]  Referring practitioner: Arnulfo Judd II, MD  Date of Initial Visit: Type: THERAPY  Noted: 10/4/2021  Today's Date: 2021  Patient seen for 7 sessions    Progress note due: 2021  Re-cert due: 2022      Subjective:   Valentina Anderson reports: 5/10 R sided neck and R elbow pain today. Pt reports that in general she has noticed about 60% improvement in overall pain and function but states that she had a flare up over the weekend after repetive lifting of dog crates up to 40#. Patient reports increased right upper trap and right lateral/medial elbow pain after this incident.  Patient reports that R elbow pain woke her up last night due to increased pain.     Subjective Questionnaire: NDI:  Clinical Progress: improved  Home Program Compliance: Yes  Treatment has included: therapeutic exercise, manual therapy, electrical stimulation, moist heat and cryotherapy    Subjective   Objective    Increased R upper  Trap tightness noted       Tenderness     Right Elbow   Tenderness in the lateral epicondyle and medial epicondyle.     Right Wrist/Hand   Tenderness in the lateral epicondyle and medial epicondyle.     Additional Tenderness Details  Tightness noted at R lateral epicondyle    Active Range of Motion   Cervical/Thoracic Spine   Cervical    Flexion: 45 degrees   Extension: 45 degrees   Left rotation: 65 degrees with pain  Right rotation: 68 degrees with pain    Strength/Myotome Testing     Left Shoulder     Planes of Motion   Flexion: 4   Extension: 4+   Abduction: 4+   External rotation at 0°: 4   Internal rotation at 0°: 4+     Right Shoulder     Planes of Motion   Flexion: 4   Extension: 4+   Abduction: 4+   External rotation at 0°: 4   Internal rotation at 0°: 4+     Left Elbow   Flexion: 4+  Extension: 4+    Right Elbow   Flexion: 4+  Extension: 4+    Left Wrist/Hand   Wrist  extension: 4+  Wrist flexion: 4+    Right Wrist/Hand   Wrist extension: 4+  Wrist flexion: 4+      Assessment & Plan     Assessment  Impairments: abnormal muscle firing, abnormal muscle tone, abnormal or restricted ROM, activity intolerance, impaired physical strength, lacks appropriate home exercise program, pain with function and safety issue  Assessment details: Patient demonstrates improvements in cervical AROM but continues to demonstrate R upper trapezius tightness, decreased cervical ROM, and reports of R upper trap, neck, scapular and R elbow pain limiting function as shown on the NDI. Pt demonstrates signs/symptoms consistent with R epicondylitis and would benefit from treatment of this.  Patient would benefit from skilled PT services in order to address remaining deficits limiting function at this time.  Prognosis: good  Functional Limitations: lifting, sleeping, pulling, pushing, uncomfortable because of pain, sitting and unable to perform repetitive tasks  Goals  Plan Goals: 1. The patient has limited ROM.    LTG 1: 12 weeks:  The patient will demonstrate 70° of bilateral cervical spine rotation, 35° of cervical flexion, and 35° of cervical extension in order to adequately monitor blind spots while driving and improve ability to perform activities of daily living.    STATUS:  Ongoing   TREATMENT:  Manual therapy, therapeutic exercise, home exercise instruction, cervical traction, and modalities as needed to include: moist heat, electrical stimulation, and ultrasound.     2. The patient has complaints of pain.   LTG 2: 12 weeks:  The patient will report 1/10 pain in order to more easily tolerate activities of daily living and improve sleep quality.    STATUS:  Ongoing   STG 2a: 6 weeks:  The patient will report 3/10 pain.    STATUS:  Ongoing   TREATMENT: Manual therapy, therapeutic exercise, home exercise instruction, cervical traction, and modalities as needed to include: moist heat, electrical  stimulation, and ultrasound.    3. Carrying, Moving, and Handling Objects Functional Limitation     LTG 3: 12 weeks:  The patient will demonstrate 1-19% limitation by achieving a score of 5/50 on the Neck Disability Index.    STATUS:  Ongoing   TREATMENT:  Manual therapy, therapeutic exercise, home exercise instruction, and modalities as needed to include: moist heat, electrical stimulation, and ultrasound.    Plan  Therapy options: will be seen for skilled physical therapy services  Planned modality interventions: cryotherapy, electrical stimulation/Russian stimulation, TENS, traction and dry needling  Planned therapy interventions: balance/weight-bearing training, ADL retraining, soft tissue mobilization, strengthening, stretching, therapeutic activities, joint mobilization, home exercise program, functional ROM exercises, flexibility, body mechanics training, postural training, neuromuscular re-education, manual therapy, motor coordination training and spinal/joint mobilization  Frequency: 2x week  Duration in weeks: 8  Treatment plan discussed with: patient      Progress toward previous goals: Progressing towards      Recommendations: Continue as planned  Timeframe: 2 months  Prognosis to achieve goals: good    PT SIGNATURE: Mikayla Leiva, PT   DATE TREATMENT INITIATED: 2021  Certification Period: 2021 - 2022      Based upon review of the patient's progress and continued therapy plan, it is my medical opinion that Valentina Anderson should continue physical therapy treatment at [facility]  [address] [place] [id number] [phone].    Signature: __________________________________  Arnulfo Judd II, MD    Timed:  Manual Therapy:    15     mins  00584;  Therapeutic Exercise:    30     mins  36366;     Neuromuscular Geoff:    0    mins  31782;    Therapeutic Activity:     0     mins  61355;     Gait Trainin     mins  00549;      Ultrasound:     0     mins  08640;    Electrical Stimulation:    0     mins  72496 ( );    Untimed:  Electrical Stimulation:    15     mins  17126 ( );  Mechanical Traction:    0     mins  67351;     Timed Treatment:   45   mins   Total Treatment:     65   mins

## 2021-11-04 ENCOUNTER — TREATMENT (OUTPATIENT)
Dept: PHYSICAL THERAPY | Facility: CLINIC | Age: 41
End: 2021-11-04

## 2021-11-04 DIAGNOSIS — R29.3 POOR POSTURE: ICD-10-CM

## 2021-11-04 DIAGNOSIS — M54.2 CHRONIC NECK PAIN: Primary | ICD-10-CM

## 2021-11-04 DIAGNOSIS — G89.29 CHRONIC NECK PAIN: Primary | ICD-10-CM

## 2021-11-04 DIAGNOSIS — M77.11 LATERAL EPICONDYLITIS OF RIGHT ELBOW: ICD-10-CM

## 2021-11-04 DIAGNOSIS — R29.898 NECK TIGHTNESS: ICD-10-CM

## 2021-11-04 PROCEDURE — 97014 ELECTRIC STIMULATION THERAPY: CPT | Performed by: PHYSICAL THERAPIST

## 2021-11-04 PROCEDURE — 97140 MANUAL THERAPY 1/> REGIONS: CPT | Performed by: PHYSICAL THERAPIST

## 2021-11-04 NOTE — PROGRESS NOTES
Physical Therapy Daily Progress Note        Patient: Valentina Anderson   : 1980  Diagnosis/ICD-10 Code:  Chronic neck pain [M54.2, G89.29]  Referring practitioner: No ref. provider found  Date of Initial Visit: Type: THERAPY  Noted: 10/4/2021  Today's Date: 2021  Patient seen for 8 sessions             Subjective   Valentina Anderson reports: increased R upper trap and shoulder pain after last session as well as increased headaches. Pt reports increased stress the past week.    Objective   Bilateral scapular stabilizer MMT: 4-/5  See Exercise, Manual, and Modality Logs for complete treatment.       Assessment/Plan  Patient tolerated all exercise progressions, modalities and manual therapy well today but continues to demonstrate strength/ROM deficits limiting function. Continue to progress per patient tolerance.    Progress per Plan of Care           Timed:  Manual Therapy:    25     mins  03951;  Therapeutic Exercise:    5     mins  84836;     Neuromuscular Geoff:    0    mins  17887;    Therapeutic Activity:     0     mins  89912;     Gait Trainin     mins  19212;     Ultrasound:     0     mins  56032;    Electrical Stimulation:    0     mins  32767;  Iontophoresis     0     mins  23198    Untimed:  Electrical Stimulation:    15     mins  05331 ( );  Mechanical Traction:    0     mins  84934;   Fluidotherapy     0     mins  79439      Timed Treatment:   30   mins   Total Treatment:     45   mins        Mikayla Leiva PT  Physical Therapist  Electronically signed [unfilled]  [place] License: [id number]  NPI number: [id number]

## 2021-11-08 ENCOUNTER — TREATMENT (OUTPATIENT)
Dept: PHYSICAL THERAPY | Facility: CLINIC | Age: 41
End: 2021-11-08

## 2021-11-08 DIAGNOSIS — R29.898 NECK TIGHTNESS: ICD-10-CM

## 2021-11-08 DIAGNOSIS — M54.2 CHRONIC NECK PAIN: Primary | ICD-10-CM

## 2021-11-08 DIAGNOSIS — M77.11 LATERAL EPICONDYLITIS OF RIGHT ELBOW: ICD-10-CM

## 2021-11-08 DIAGNOSIS — R29.3 POOR POSTURE: ICD-10-CM

## 2021-11-08 DIAGNOSIS — G89.29 CHRONIC NECK PAIN: Primary | ICD-10-CM

## 2021-11-08 PROCEDURE — 97014 ELECTRIC STIMULATION THERAPY: CPT | Performed by: PHYSICAL THERAPIST

## 2021-11-08 PROCEDURE — 97140 MANUAL THERAPY 1/> REGIONS: CPT | Performed by: PHYSICAL THERAPIST

## 2021-11-08 NOTE — PROGRESS NOTES
Physical Therapy Daily Progress Note        Patient: Valentina Anderson   : 1980  Diagnosis/ICD-10 Code:  Chronic neck pain [M54.2, G89.29]  Referring practitioner: Arnulfo Judd II, MD  Date of Initial Visit: No linked episodes  Today's Date: 2021  Patient seen for Visit count could not be calculated. Make sure you are using a visit which is associated with an episode. sessions             Subjective   Valentina Anderson reports having significant tightness at right cervical/scapular musculature - rating her pain at 4/10 upon arrival today.  She does report that her pain is a little less as compared to most recent sessions.      Objective     + Tightness / Tenderness bilateral Cervical/Scapular musculature- right greater than left      See Exercise, Manual, and Modality Logs for complete treatment.       Assessment/Plan   Pt tolerated therapy session moderately well - with progression of therapeutic exercises, Manual therapy, and Modalities. She continues to have deficits in Cervical ROM,  Strength, Stability, and Pain; limiting function and ability to perform ADLs at this time.  She has responded well to manual therapy and Modalities reporting temporary reduction in pain and tightness post sessions.  Pt scheduled for trial of Dry Needling next session to address Trigger points at right cervical/scapular region.      Progress per Plan of Care           Timed:  Manual Therapy:    25     mins  29756;  Therapeutic Exercise:    5     mins  00092;     Neuromuscular Geoff:    0    mins  85520;    Therapeutic Activity:     0     mins  24957;     Gait Trainin     mins  87155;     Ultrasound:     0     mins  47332;    Electrical Stimulation:    0     mins  06584;  Iontophoresis     0     mins  37465    Untimed:  Electrical Stimulation:    15     mins  14729 ( );  Mechanical Traction:    0     mins  18770;   Fluidotherapy     0     mins  53097      Timed Treatment:   30   mins   Total Treatment:     45    chantelle Patricio PTA  Physical Therapist Assistant

## 2021-11-10 ENCOUNTER — TREATMENT (OUTPATIENT)
Dept: PHYSICAL THERAPY | Facility: CLINIC | Age: 41
End: 2021-11-10

## 2021-11-10 DIAGNOSIS — R29.3 POOR POSTURE: ICD-10-CM

## 2021-11-10 DIAGNOSIS — M54.2 CHRONIC NECK PAIN: Primary | ICD-10-CM

## 2021-11-10 DIAGNOSIS — R29.898 NECK TIGHTNESS: ICD-10-CM

## 2021-11-10 DIAGNOSIS — M77.11 LATERAL EPICONDYLITIS OF RIGHT ELBOW: ICD-10-CM

## 2021-11-10 DIAGNOSIS — G89.29 CHRONIC NECK PAIN: Primary | ICD-10-CM

## 2021-11-10 PROCEDURE — 20561 NDL INSJ W/O NJX 3+ MUSC: CPT | Performed by: PHYSICAL THERAPIST

## 2021-11-10 NOTE — PROGRESS NOTES
Physical Therapy Daily Progress Note    Patient: Valentina Anderson   : 1980  Diagnosis/ICD-10 Code:  Chronic neck pain [M54.2, G89.29]  Referring practitioner: No ref. provider found  Date of Initial Visit: Type: THERAPY  Noted: 10/4/2021  Today's Date: 11/10/2021  Patient seen for 10 sessions           Subjective   The patient reported that her pain is located in her right upper trapezius and neck today. She is also experiencing suboccipital headaches. She provided written and verbal consent prior to undergoing dry needling today.    Objective   See Exercise, Manual, and Modality Logs for complete treatment.     Assessment/Plan  The patient was educated on the risks/benefits associated with dry needling prior to undergoing treatment. She consented to dry needing. She demonstrated good tolerance to dry needling with no immediate adverse effects. Needling did trigger a sympathetic nervous system response which resulted in mild dizziness and nausea. This subsided prior to the patient leaving the clinic today. Treatment was successful in eliciting multiple local twitch responses in the right upper trapezius. It in anticipated that she will be sore for 24-48 hours following treatment. Assess long term benefit at next session.       Timed:  Manual Therapy:    0     mins  23015;  Therapeutic Exercise:    0     mins  21277;     Neuromuscular Geoff:   0    mins  34575;    Therapeutic Activity:     0     mins  26050;     Gait Trainin     mins  05721;     Aquatics                         0      mins  80217    Un-timed:  Mechanical Traction      0     mins  54111  Dry Needling     15     mins self-pay  Electrical Stimulation:    0     mins  23614 ( );      Timed Treatment:   0   mins   Total Treatment:     15   mins    Efrain Leiva PT  Physical Therapist    Electronically signed 11/10/2021    [place] License: PT - [id number]

## 2021-11-16 ENCOUNTER — OFFICE VISIT (OUTPATIENT)
Dept: BARIATRICS/WEIGHT MGMT | Facility: CLINIC | Age: 41
End: 2021-11-16

## 2021-11-16 VITALS
WEIGHT: 212 LBS | RESPIRATION RATE: 18 BRPM | DIASTOLIC BLOOD PRESSURE: 81 MMHG | BODY MASS INDEX: 33.27 KG/M2 | HEIGHT: 67 IN | HEART RATE: 78 BPM | SYSTOLIC BLOOD PRESSURE: 122 MMHG | TEMPERATURE: 97.5 F

## 2021-11-16 DIAGNOSIS — F32.A ANXIETY AND DEPRESSION: ICD-10-CM

## 2021-11-16 DIAGNOSIS — R53.82 CHRONIC FATIGUE: ICD-10-CM

## 2021-11-16 DIAGNOSIS — F41.9 ANXIETY AND DEPRESSION: ICD-10-CM

## 2021-11-16 DIAGNOSIS — E66.9 OBESITY, CLASS I, BMI 30-34.9: Primary | ICD-10-CM

## 2021-11-16 DIAGNOSIS — Z98.84 S/P LAPAROSCOPIC SLEEVE GASTRECTOMY: ICD-10-CM

## 2021-11-16 DIAGNOSIS — E28.2 PCOS (POLYCYSTIC OVARIAN SYNDROME): ICD-10-CM

## 2021-11-16 DIAGNOSIS — K21.9 CHRONIC GERD: ICD-10-CM

## 2021-11-16 PROBLEM — E66.812 OBESITY, CLASS II, BMI 35-39.9: Status: RESOLVED | Noted: 2021-02-19 | Resolved: 2021-11-16

## 2021-11-16 PROBLEM — E66.811 OBESITY, CLASS I, BMI 30-34.9: Status: ACTIVE | Noted: 2021-02-19

## 2021-11-16 PROCEDURE — 99213 OFFICE O/P EST LOW 20 MIN: CPT | Performed by: NURSE PRACTITIONER

## 2021-11-16 NOTE — PROGRESS NOTES
[facility]  [address] [facility] [address]  [facility] [address]  [phone]  [address] [address]  [phone]  Dept: [phone]  11/16/2021      Valentina Anderson.  [id number]  [id number]  1980  female      Chief Complaint   Patient presents with   • Follow-up     1 YEAR SLEEVE FOLLOW UP        Post-Op Bariatric Surgery:   Valentina Anderson is status post Laparoscopic Sleeve with paraesophageal hernia repair procedure, performed on 11/4/20     HPI:   Today's weight is 96.2 kg (212 lb) pounds, today's BMI is Body mass index is 32.77 kg/m².,@ has a  loss of 4 pounds since the last visit and@ weight loss since surgery is 91 pounds. The patient reports a decreased portion size and loss of appetite.       Valentina Anderson denies nausea, vomiting, reflux and reports heartburn at times, but usually doesn't notice any if she eats slowly and chews well.      Diet and Exercise: Diet history reviewed and discussed with the patient. Weight loss/gains to date discussed with the patient. The patient states they are eating  grams of protein per day. She reports eating 3 meals per day, a typical portion size of 1/2 cup, eating 0-1 snacks per day, drinking 5-6 or more 8-oz. glasses of water per day, no carbonated beverage consumption and exercising regularly- walking, rowing, weights 5 days per week.     She is getting most of her protein from real food.     Supplements: celebrate [employer] with iron and calcium     Review of Systems   Constitutional: Positive for appetite change. Negative for fatigue and unexpected weight change.   HENT: Negative.    Eyes: Negative.    Respiratory: Negative.    Cardiovascular: Negative.  Negative for leg swelling.   Gastrointestinal: Negative for abdominal distention, abdominal pain, constipation, diarrhea, nausea and vomiting.        Heartburn- primarily sees if she is eating quickly   Genitourinary:  Negative for difficulty urinating, frequency and urgency.   Musculoskeletal: Negative for back pain.   Skin: Negative.    Psychiatric/Behavioral: Negative.    All other systems reviewed and are negative.      Patient Active Problem List   Diagnosis   • Chiari malformation type I (HCC)   • Dizziness   • New daily persistent headache   • History of brain surgery   • Degeneration of intervertebral disc at C6-C7 level   • Bilateral occipital neuralgia   • Chronic fatigue   • Multiple joint pain   • Anxiety and depression   • Hypothyroid   • PCOS (polycystic ovarian syndrome)   • Chronic GERD   • Endometriosis   • Multiple gastric polyps   • S/P laparoscopic sleeve gastrectomy   • Dietary counseling   • Obesity, Class I, BMI 30-34.9   • Abnormal electrocardiogram   • Anxiety   • Vitamin D deficiency       Past Medical History:   Diagnosis Date   • Chiari I malformation (HCC)    • DDD (degenerative disc disease), cervical    • DDD (degenerative disc disease), cervical    • Disease of thyroid gland    • Dizziness    • Endometriosis    • GERD (gastroesophageal reflux disease)    • Headache    • Heart palpitations    • Hiatal hernia    • PONV (postoperative nausea and vomiting)        The following portions of the patient's history were reviewed and updated as appropriate: allergies, current medications, past family history, past medical history, past social history, past surgical history and problem list.    Vitals:    11/16/21 1430   BP: 122/81   Pulse: 78   Resp: 18   Temp: 97.5 °F (36.4 °C)       Physical Exam  Vitals and nursing note reviewed.   Constitutional:       Appearance: She is well-developed.   Neck:      Thyroid: No thyromegaly.   Cardiovascular:      Rate and Rhythm: Normal rate.   Pulmonary:      Effort: Pulmonary effort is normal. No respiratory distress.   Abdominal:      Palpations: Abdomen is soft.   Musculoskeletal:         General: No tenderness.   Skin:     General: Skin is warm and dry.    Neurological:      Mental Status: She is alert.   Psychiatric:         Behavior: Behavior normal.         Assessment:   Post-op, the patient is doing well.     Encounter Diagnoses   Name Primary?   • Obesity, Class I, BMI 30-34.9 Yes   • PCOS (polycystic ovarian syndrome)    • S/P laparoscopic sleeve gastrectomy    • Chronic GERD    • Anxiety and depression    • Chronic fatigue        Plan:   Encouraged patient to be sure to get plenty of lean protein per day through small frequent meals all with a protein source.   Activity restrictions: none.   Recommended patient be sure to get at least 70 grams of protein per day by eating small, frequent meals all with high lean protein choices. Be sure to limit/cut back on daily carbohydrate intake. Discussed with the patient the recommended amount of water per day to intake- half of body weight in ounces. Reviewed vitamin requirements. Be sure to do routine exercise, 150 minutes per week minimum, including both cardio and strength training.     Instructions / Recommendations: dietary counseling recommended, recommended a daily protein intake of  grams, vitamin supplement(s) recommended, recommended exercising at least 150 minutes per week, behavior modifications recommended and instructed to call the office for concerns, questions, or problems.     The patient was instructed to follow up in 3 months .     Total time spent during this encounter today was 25 minutes

## 2021-11-19 ENCOUNTER — TREATMENT (OUTPATIENT)
Dept: PHYSICAL THERAPY | Facility: CLINIC | Age: 41
End: 2021-11-19

## 2021-11-19 DIAGNOSIS — G89.29 CHRONIC NECK PAIN: Primary | ICD-10-CM

## 2021-11-19 DIAGNOSIS — M77.11 LATERAL EPICONDYLITIS OF RIGHT ELBOW: ICD-10-CM

## 2021-11-19 DIAGNOSIS — R29.3 POOR POSTURE: ICD-10-CM

## 2021-11-19 DIAGNOSIS — R29.898 NECK TIGHTNESS: ICD-10-CM

## 2021-11-19 DIAGNOSIS — M54.2 CHRONIC NECK PAIN: Primary | ICD-10-CM

## 2021-11-19 PROCEDURE — PTNOCHG PR CUSTOM PT NO CHARGE VISIT: Performed by: PHYSICAL THERAPIST

## 2021-11-19 NOTE — PROGRESS NOTES
Physical Therapy Daily Progress Note        Patient: Valentina Anderson   : 1980  Diagnosis/ICD-10 Code:  Chronic neck pain [M54.2, G89.29]  Referring practitioner: Arnulfo Judd II, MD  Date of Initial Visit: Type: THERAPY  Noted: 10/4/2021  Today's Date: 2021  Patient seen for 11 sessions             Subjective   Valentina Andersno reports: that she has had no pain since dry needling session last week.  Pt reports that she is able to complete all ADLs with no pain/difficulty.  Patient states that she was able to sleep and has had no headaches.    Objective   Cervical AROM: WFL all directions  Bilateral upper trap tightness improved since last session with this therapist.  See Exercise, Manual, and Modality Logs for complete treatment.       Assessment/Plan  Therapy interventions held this session due to improvements in cervical ROM, UT tightness and reports of pain and function. Pt educated to keep up with HEP and will follow up with pt in about 2 weeks if needed. Pt reports that she is comfortable with this plan.  Progress per Plan of Care           Timed:  Manual Therapy:    0     mins  57961;  Therapeutic Exercise:    0     mins  52502;     Neuromuscular Geoff:    0    mins  54238;    Therapeutic Activity:     0     mins  96453;     Gait Trainin     mins  09077;     Ultrasound:     0     mins  71259;    Electrical Stimulation:    0     mins  51796;  Iontophoresis     0     mins  78829    Untimed:  Electrical Stimulation:    0     mins  54081 ( );  Mechanical Traction:    0     mins  84779;   Fluidotherapy     0     mins  33700  Hot pack     0     Mins    Cold pack                       0     Mins     Timed Treatment:   0   mins   Total Treatment:     10   mins        Mikayla Leiva PT    Electronically signed [unfilled]  [place] License: [id number]  NPI number: [id number]

## 2021-12-06 ENCOUNTER — IMMUNIZATION (OUTPATIENT)
Dept: VACCINE CLINIC | Facility: HOSPITAL | Age: 41
End: 2021-12-06

## 2021-12-06 DIAGNOSIS — Z23 NEED FOR VACCINATION: Primary | ICD-10-CM

## 2021-12-06 PROCEDURE — 0064A HC ADM SARSCOV2 50MCG/0.25ML BOOSTER: CPT | Performed by: INTERNAL MEDICINE

## 2021-12-06 PROCEDURE — 91306 HC SARSCOV2 VAC 50MCG/0.25ML IM: CPT | Performed by: INTERNAL MEDICINE

## 2021-12-06 RX ORDER — LEVONORGESTREL AND ETHINYL ESTRADIOL AND ETHINYL ESTRADIOL 0.15MG(84)
1 KIT ORAL DAILY
Qty: 91 TABLET | Refills: 6 | Status: SHIPPED | OUTPATIENT
Start: 2021-12-06 | End: 2023-01-16

## 2022-01-28 ENCOUNTER — OFFICE VISIT (OUTPATIENT)
Dept: NEUROLOGY | Facility: CLINIC | Age: 42
End: 2022-01-28

## 2022-01-28 VITALS
OXYGEN SATURATION: 98 % | BODY MASS INDEX: 32.77 KG/M2 | DIASTOLIC BLOOD PRESSURE: 72 MMHG | SYSTOLIC BLOOD PRESSURE: 126 MMHG | HEIGHT: 67 IN | HEART RATE: 88 BPM

## 2022-01-28 DIAGNOSIS — R47.89 WORD FINDING DIFFICULTY: ICD-10-CM

## 2022-01-28 DIAGNOSIS — M54.81 OCCIPITAL NEURALGIA OF RIGHT SIDE: Primary | ICD-10-CM

## 2022-01-28 DIAGNOSIS — R20.2 PARESTHESIAS: ICD-10-CM

## 2022-01-28 DIAGNOSIS — Z86.39 HISTORY OF NON ANEMIC VITAMIN B12 DEFICIENCY: ICD-10-CM

## 2022-01-28 PROCEDURE — 99214 OFFICE O/P EST MOD 30 MIN: CPT | Performed by: PSYCHIATRY & NEUROLOGY

## 2022-01-28 RX ORDER — CYANOCOBALAMIN 1000 UG/ML
1000 INJECTION, SOLUTION INTRAMUSCULAR; SUBCUTANEOUS DAILY
Qty: 12 ML | Refills: 0 | Status: SHIPPED | OUTPATIENT
Start: 2022-01-28 | End: 2022-07-07

## 2022-01-28 NOTE — PROGRESS NOTES
Chief Complaint   Patient presents with   • Occipital neuralgia       Patient ID: Valentina Anderson is a 41 y.o. female.    HPI: I have had the pleasure of seeing your patient today.  As you may know she is a 41-year-old female whom I seen previously for history of occipital neuralgia and chronic neck pain.  She is doing much better from a neck pain and occipital neuralgia standpoint.  She says that she has been having more issues with word finding difficulty.  This has been an issue for the past several weeks now.  She has had issues with tingling in her hands and feet.  She does have a history of vitamin B12 deficiency.  She denies any issues with balance currently.  No double vision or vision loss.  No slurred speech.  No bowel or bladder changes.    The following portions of the patient's history were reviewed and updated as appropriate: allergies, current medications, past family history, past medical history, past social history, past surgical history and problem list.    Review of Systems   Musculoskeletal: Negative for back pain, gait problem and neck pain.   Allergic/Immunologic: Negative for environmental allergies, food allergies and immunocompromised state.   Neurological: Negative for dizziness, tremors, seizures, syncope, facial asymmetry, speech difficulty, weakness, light-headedness, numbness and headaches.   Hematological: Negative for adenopathy. Does not bruise/bleed easily.   Psychiatric/Behavioral: Negative for agitation, behavioral problems, confusion, decreased concentration, dysphoric mood, hallucinations, self-injury, sleep disturbance and suicidal ideas. The patient is not nervous/anxious and is not hyperactive.       I have reviewed the review of systems above performed by my medical assistant.      Vitals:    01/28/22 0751   BP: 126/72   Pulse: 88   SpO2: 98%       Neurologic Exam     Mental Status   Oriented to person, place, and time.   Concentration: normal.   Level of consciousness:  alert  Knowledge: consistent with education (No deficits found.).     Cranial Nerves     CN II   Visual fields full to confrontation.     CN III, IV, VI   Pupils are equal, round, and reactive to light.  Extraocular motions are normal.   CN III: no CN III palsy  CN VI: no CN VI palsy    CN V   Facial sensation intact.     CN VII   Facial expression full, symmetric.     CN VIII   CN VIII normal.     CN IX, X   CN IX normal.   CN X normal.     CN XI   CN XI normal.     CN XII   CN XII normal.     Motor Exam     Strength   Right neck flexion: 5/5  Left neck flexion: 5/5  Right neck extension: 5/5  Left neck extension: 5/5  Right deltoid: 5/5  Left deltoid: 5/5  Right biceps: 5/5  Left biceps: 5/5  Right triceps: 5/5  Left triceps: 5/5  Right wrist flexion: 5/5  Left wrist flexion: 5/5  Right wrist extension: 5/5  Left wrist extension: 5/5  Right interossei: 5/5  Left interossei: 5/5  Right abdominals: 5/5  Left abdominals: 5/5  Right iliopsoas: 5/5  Left iliopsoas: 5/5  Right quadriceps: 5/5  Left quadriceps: 5/5  Right hamstrin/5  Left hamstrin/5  Right glutei: 5/5  Left glutei: 5/5  Right anterior tibial: 5/5  Left anterior tibial: 5/5  Right posterior tibial: 5/5  Left posterior tibial: 5/5  Right peroneal: 5/5  Left peroneal: 5/5  Right gastroc: 5/5  Left gastroc: 5/5    Sensory Exam   Light touch normal.   Vibration normal.     Gait, Coordination, and Reflexes     Gait  Gait: normal    Reflexes   Right brachioradialis: 2+  Left brachioradialis: 2+  Right biceps: 2+  Left biceps: 2+  Right triceps: 2+  Left triceps: 2+  Right patellar: 2+  Left patellar: 2+  Right achilles: 2+  Left achilles: 2+  Right : 2+  Left : 2+Station is normal.       Physical Exam  Vitals reviewed.   Constitutional:       Appearance: She is well-developed.   HENT:      Head: Normocephalic and atraumatic.   Eyes:      Extraocular Movements: EOM normal.      Pupils: Pupils are equal, round, and reactive to light.    Cardiovascular:      Rate and Rhythm: Normal rate and regular rhythm.   Pulmonary:      Breath sounds: Normal breath sounds.   Musculoskeletal:         General: Normal range of motion.   Skin:     General: Skin is warm.   Neurological:      Mental Status: She is oriented to person, place, and time.      Gait: Gait is intact.      Deep Tendon Reflexes:      Reflex Scores:       Tricep reflexes are 2+ on the right side and 2+ on the left side.       Bicep reflexes are 2+ on the right side and 2+ on the left side.       Brachioradialis reflexes are 2+ on the right side and 2+ on the left side.       Patellar reflexes are 2+ on the right side and 2+ on the left side.       Achilles reflexes are 2+ on the right side and 2+ on the left side.        Procedures    Assessment/Plan: At this point I do not feel that she is experiencing any sort of early onset dementia.  However I would be concerned about the vitamin B12 issue that she has had with respect to the word finding trouble and paresthesias.  We will start her on a vitamin B12 injection protocol.  This will be called into her pharmacy.  We will see her back in 4 months.  A total of 35 minutes was spent face-to-face with the patient today.  Of that greater than 50% of this time was spent discussing signs and symptoms of B12 deficiency, paresthesias, word finding difficulty, patient education, plan of care and prognosis.       Diagnoses and all orders for this visit:    1. Occipital neuralgia of right side (Primary)    2. Word finding difficulty  -     cyanocobalamin 1000 MCG/ML injection; Inject 1 mL into the appropriate muscle as directed by prescriber Daily. 1 mL q week x 4 weeks, 1 mL qow x 4 mos  Dispense: 12 mL; Refill: 0    3. Paresthesias  -     cyanocobalamin 1000 MCG/ML injection; Inject 1 mL into the appropriate muscle as directed by prescriber Daily. 1 mL q week x 4 weeks, 1 mL qow x 4 mos  Dispense: 12 mL; Refill: 0    4. History of non anemic vitamin B12  deficiency  -     cyanocobalamin 1000 MCG/ML injection; Inject 1 mL into the appropriate muscle as directed by prescriber Daily. 1 mL q week x 4 weeks, 1 mL qow x 4 mos  Dispense: 12 mL; Refill: 0           Arnulfo Judd II, MD

## 2022-03-03 ENCOUNTER — LAB (OUTPATIENT)
Dept: LAB | Facility: HOSPITAL | Age: 42
End: 2022-03-03

## 2022-03-03 ENCOUNTER — TRANSCRIBE ORDERS (OUTPATIENT)
Dept: ADMINISTRATIVE | Facility: HOSPITAL | Age: 42
End: 2022-03-03

## 2022-03-03 DIAGNOSIS — I10 PRIMARY HYPERTENSION: ICD-10-CM

## 2022-03-03 DIAGNOSIS — D51.8 DIETARY VITAMIN B12 DEFICIENCY ANEMIA: Primary | ICD-10-CM

## 2022-03-03 DIAGNOSIS — E53.8 VITAMIN B 12 DEFICIENCY: ICD-10-CM

## 2022-03-03 DIAGNOSIS — E55.9 VITAMIN D DEFICIENCY: ICD-10-CM

## 2022-03-03 DIAGNOSIS — M25.50 ARTHRALGIA, UNSPECIFIED JOINT: ICD-10-CM

## 2022-03-03 DIAGNOSIS — G43.909 MIGRAINE SYNDROME: ICD-10-CM

## 2022-03-03 DIAGNOSIS — D51.8 DIETARY VITAMIN B12 DEFICIENCY ANEMIA: ICD-10-CM

## 2022-03-03 DIAGNOSIS — E03.9 HYPOTHYROIDISM, UNSPECIFIED TYPE: ICD-10-CM

## 2022-03-03 LAB
25(OH)D3 SERPL-MCNC: 78.2 NG/ML (ref 30–100)
ALBUMIN SERPL-MCNC: 3.6 G/DL (ref 3.5–5.2)
ALBUMIN/GLOB SERPL: 1 G/DL
ALP SERPL-CCNC: 39 U/L (ref 39–117)
ALT SERPL W P-5'-P-CCNC: 17 U/L (ref 1–33)
ANION GAP SERPL CALCULATED.3IONS-SCNC: 10.9 MMOL/L (ref 5–15)
AST SERPL-CCNC: 18 U/L (ref 1–32)
BASOPHILS # BLD AUTO: 0.04 10*3/MM3 (ref 0–0.2)
BASOPHILS NFR BLD AUTO: 0.8 % (ref 0–1.5)
BILIRUB SERPL-MCNC: 0.4 MG/DL (ref 0–1.2)
BUN SERPL-MCNC: 11 MG/DL (ref 6–20)
BUN/CREAT SERPL: 12.5 (ref 7–25)
CALCIUM SPEC-SCNC: 9.2 MG/DL (ref 8.6–10.5)
CHLORIDE SERPL-SCNC: 108 MMOL/L (ref 98–107)
CHOLEST SERPL-MCNC: 139 MG/DL (ref 0–200)
CHROMATIN AB SERPL-ACNC: <10 IU/ML (ref 0–14)
CK SERPL-CCNC: 79 U/L (ref 20–180)
CO2 SERPL-SCNC: 24.1 MMOL/L (ref 22–29)
CREAT SERPL-MCNC: 0.88 MG/DL (ref 0.57–1)
CRP SERPL-MCNC: 0.32 MG/DL (ref 0–0.5)
DEPRECATED RDW RBC AUTO: 44.8 FL (ref 37–54)
EGFRCR SERPLBLD CKD-EPI 2021: 83.2 ML/MIN/1.73
EOSINOPHIL # BLD AUTO: 0.11 10*3/MM3 (ref 0–0.4)
EOSINOPHIL NFR BLD AUTO: 2.1 % (ref 0.3–6.2)
ERYTHROCYTE [DISTWIDTH] IN BLOOD BY AUTOMATED COUNT: 13.1 % (ref 12.3–15.4)
ERYTHROCYTE [SEDIMENTATION RATE] IN BLOOD: 15 MM/HR (ref 0–20)
FOLATE SERPL-MCNC: >20 NG/ML (ref 4.78–24.2)
GLOBULIN UR ELPH-MCNC: 3.5 GM/DL
GLUCOSE SERPL-MCNC: 87 MG/DL (ref 65–99)
HAV IGM SERPL QL IA: NORMAL
HBA1C MFR BLD: 4.8 % (ref 4.8–5.6)
HBV CORE IGM SERPL QL IA: NORMAL
HBV SURFACE AG SERPL QL IA: NORMAL
HCT VFR BLD AUTO: 42.4 % (ref 34–46.6)
HCV AB SER DONR QL: NORMAL
HDLC SERPL-MCNC: 46 MG/DL (ref 40–60)
HGB BLD-MCNC: 14 G/DL (ref 12–15.9)
IMM GRANULOCYTES # BLD AUTO: 0 10*3/MM3 (ref 0–0.05)
IMM GRANULOCYTES NFR BLD AUTO: 0 % (ref 0–0.5)
LDLC SERPL CALC-MCNC: 80 MG/DL (ref 0–100)
LDLC/HDLC SERPL: 1.74 {RATIO}
LYMPHOCYTES # BLD AUTO: 2.09 10*3/MM3 (ref 0.7–3.1)
LYMPHOCYTES NFR BLD AUTO: 40.7 % (ref 19.6–45.3)
MCH RBC QN AUTO: 30.4 PG (ref 26.6–33)
MCHC RBC AUTO-ENTMCNC: 33 G/DL (ref 31.5–35.7)
MCV RBC AUTO: 92.2 FL (ref 79–97)
MONOCYTES # BLD AUTO: 0.42 10*3/MM3 (ref 0.1–0.9)
MONOCYTES NFR BLD AUTO: 8.2 % (ref 5–12)
NEUTROPHILS NFR BLD AUTO: 2.47 10*3/MM3 (ref 1.7–7)
NEUTROPHILS NFR BLD AUTO: 48.2 % (ref 42.7–76)
NRBC BLD AUTO-RTO: 0 /100 WBC (ref 0–0.2)
PLATELET # BLD AUTO: 276 10*3/MM3 (ref 140–450)
PMV BLD AUTO: 9.9 FL (ref 6–12)
POTASSIUM SERPL-SCNC: 3.8 MMOL/L (ref 3.5–5.2)
PROT SERPL-MCNC: 7.1 G/DL (ref 6–8.5)
RBC # BLD AUTO: 4.6 10*6/MM3 (ref 3.77–5.28)
SODIUM SERPL-SCNC: 143 MMOL/L (ref 136–145)
TRIGL SERPL-MCNC: 65 MG/DL (ref 0–150)
TSH SERPL DL<=0.05 MIU/L-ACNC: 3.73 UIU/ML (ref 0.27–4.2)
URATE SERPL-MCNC: 5.2 MG/DL (ref 2.4–5.7)
VIT B12 BLD-MCNC: 1331 PG/ML (ref 211–946)
VLDLC SERPL-MCNC: 13 MG/DL (ref 5–40)
WBC NRBC COR # BLD: 5.13 10*3/MM3 (ref 3.4–10.8)

## 2022-03-03 PROCEDURE — 80061 LIPID PANEL: CPT

## 2022-03-03 PROCEDURE — 84550 ASSAY OF BLOOD/URIC ACID: CPT

## 2022-03-03 PROCEDURE — 83036 HEMOGLOBIN GLYCOSYLATED A1C: CPT

## 2022-03-03 PROCEDURE — 82550 ASSAY OF CK (CPK): CPT

## 2022-03-03 PROCEDURE — 86235 NUCLEAR ANTIGEN ANTIBODY: CPT

## 2022-03-03 PROCEDURE — 80050 GENERAL HEALTH PANEL: CPT

## 2022-03-03 PROCEDURE — 85613 RUSSELL VIPER VENOM DILUTED: CPT

## 2022-03-03 PROCEDURE — 85732 THROMBOPLASTIN TIME PARTIAL: CPT

## 2022-03-03 PROCEDURE — 80074 ACUTE HEPATITIS PANEL: CPT

## 2022-03-03 PROCEDURE — 85652 RBC SED RATE AUTOMATED: CPT

## 2022-03-03 PROCEDURE — 82306 VITAMIN D 25 HYDROXY: CPT

## 2022-03-03 PROCEDURE — 86618 LYME DISEASE ANTIBODY: CPT

## 2022-03-03 PROCEDURE — 86225 DNA ANTIBODY NATIVE: CPT

## 2022-03-03 PROCEDURE — 86431 RHEUMATOID FACTOR QUANT: CPT

## 2022-03-03 PROCEDURE — 86038 ANTINUCLEAR ANTIBODIES: CPT

## 2022-03-03 PROCEDURE — 84482 T3 REVERSE: CPT

## 2022-03-03 PROCEDURE — 83516 IMMUNOASSAY NONANTIBODY: CPT

## 2022-03-03 PROCEDURE — 86200 CCP ANTIBODY: CPT

## 2022-03-03 PROCEDURE — 82607 VITAMIN B-12: CPT

## 2022-03-03 PROCEDURE — 86140 C-REACTIVE PROTEIN: CPT

## 2022-03-03 PROCEDURE — 86376 MICROSOMAL ANTIBODY EACH: CPT

## 2022-03-03 PROCEDURE — 36415 COLL VENOUS BLD VENIPUNCTURE: CPT

## 2022-03-03 PROCEDURE — 82746 ASSAY OF FOLIC ACID SERUM: CPT

## 2022-03-04 LAB
ANA SER QL: POSITIVE
B BURGDOR IGG+IGM SER-ACNC: <0.91 ISR (ref 0–0.9)
CENTROMERE B AB SER-ACNC: <0.2 AI (ref 0–0.9)
CHROMATIN AB SERPL-ACNC: <0.2 AI (ref 0–0.9)
DSDNA AB SER-ACNC: 1 IU/ML (ref 0–9)
ENA JO1 AB SER-ACNC: <0.2 AI (ref 0–0.9)
ENA RNP AB SER-ACNC: 1 AI (ref 0–0.9)
ENA SCL70 AB SER-ACNC: <0.2 AI (ref 0–0.9)
ENA SM AB SER-ACNC: <0.2 AI (ref 0–0.9)
ENA SM+RNP AB SER-ACNC: <0.2 AI (ref 0–0.9)
ENA SS-A AB SER-ACNC: 0.2 AI (ref 0–0.9)
ENA SS-B AB SER-ACNC: <0.2 AI (ref 0–0.9)
LA 2 SCREEN W REFLEX-IMP: NORMAL
Lab: ABNORMAL
RIBOSOMAL P AB SER-ACNC: <0.2 AI (ref 0–0.9)
SCREEN APTT: 31.8 SEC (ref 0–51.9)
SCREEN DRVVT: 37.2 SEC (ref 0–47)
THYROPEROXIDASE AB SERPL-ACNC: <8 IU/ML (ref 0–34)

## 2022-03-05 LAB — CCP IGA+IGG SERPL IA-ACNC: 6 UNITS (ref 0–19)

## 2022-03-09 LAB — T3REVERSE SERPL-MCNC: 17.8 NG/DL (ref 9.2–24.1)

## 2022-03-16 ENCOUNTER — OFFICE VISIT (OUTPATIENT)
Dept: BARIATRICS/WEIGHT MGMT | Facility: CLINIC | Age: 42
End: 2022-03-16

## 2022-03-16 VITALS
HEIGHT: 67 IN | TEMPERATURE: 98 F | WEIGHT: 214 LBS | HEART RATE: 70 BPM | BODY MASS INDEX: 33.59 KG/M2 | DIASTOLIC BLOOD PRESSURE: 74 MMHG | SYSTOLIC BLOOD PRESSURE: 116 MMHG | RESPIRATION RATE: 18 BRPM

## 2022-03-16 DIAGNOSIS — K21.9 CHRONIC GERD: ICD-10-CM

## 2022-03-16 DIAGNOSIS — M25.50 MULTIPLE JOINT PAIN: ICD-10-CM

## 2022-03-16 DIAGNOSIS — E66.9 OBESITY, CLASS I, BMI 30-34.9: Primary | ICD-10-CM

## 2022-03-16 DIAGNOSIS — F41.9 ANXIETY AND DEPRESSION: ICD-10-CM

## 2022-03-16 DIAGNOSIS — Z98.84 S/P LAPAROSCOPIC SLEEVE GASTRECTOMY: ICD-10-CM

## 2022-03-16 DIAGNOSIS — E55.9 VITAMIN D DEFICIENCY: ICD-10-CM

## 2022-03-16 DIAGNOSIS — F32.A ANXIETY AND DEPRESSION: ICD-10-CM

## 2022-03-16 PROCEDURE — 99213 OFFICE O/P EST LOW 20 MIN: CPT | Performed by: NURSE PRACTITIONER

## 2022-03-16 RX ORDER — SEMAGLUTIDE 0.25 MG/.5ML
0.25 INJECTION, SOLUTION SUBCUTANEOUS WEEKLY
Qty: 2 ML | Refills: 0 | Status: SHIPPED | OUTPATIENT
Start: 2022-03-16 | End: 2022-04-06

## 2022-03-16 NOTE — PROGRESS NOTES
[facility]AT[facility] BARIATRIC SURGERY  [address] [facility] [address]  [facility] [address]  [phone]  [address] [address]  [phone]  Dept: [phone]  3/16/2022      Valentina Anderson.  [id number]  [id number]  1980  female      Chief Complaint   Patient presents with   • Follow-up     SLEEVE FOLLOW UP       BH Post-Op Bariatric Surgery:   Valentina Anderson is status post Laparoscopic Sleeve/PEH procedure, performed on 11/4/20 who is here today seeking medical weight loss therapy.    HPI:   Today's weight is 97.1 kg (214 lb) pounds, today's BMI is Body mass index is 33.08 kg/m².,@ has a  gain of 2 pounds since the last visit and@ weight loss since surgery is 89 pounds. The patient reports a decreased portion size and loss of appetite.      Valentina Anderson denies nausea, vomiting, reflux and reports that she is doing well, but is frustrated with her current stall with her weight loss.      Diet and Exercise: Diet history reviewed and discussed with the patient. Weight loss/gains to date discussed with the patient. The patient states they are eating 80-60 grams of protein per day. She reports eating 3 meals per day, a typical portion size of 1/2 cup, eating 0-1 snacks per day, drinking 5-6 or more 8-oz. glasses of water per day, no carbonated beverage consumption and exercising regularly- cardio, strength training, rowing 3-4 days per week    Supplements: celebrate MTV with calcium.     Review of Systems   Constitutional: Positive for appetite change. Negative for fatigue and unexpected weight change.   HENT: Negative.    Eyes: Negative.    Respiratory: Negative.    Cardiovascular: Negative.  Negative for leg swelling.   Gastrointestinal: Negative for abdominal distention, abdominal pain, constipation, diarrhea, nausea and vomiting.   Genitourinary: Negative for difficulty urinating, frequency and urgency.   Musculoskeletal: Negative for back pain.    Skin: Negative.    Psychiatric/Behavioral: Negative.    All other systems reviewed and are negative.      Patient Active Problem List   Diagnosis   • Chiari malformation type I (HCC)   • Dizziness   • New daily persistent headache   • History of brain surgery   • Degeneration of intervertebral disc at C6-C7 level   • Bilateral occipital neuralgia   • Chronic fatigue   • Multiple joint pain   • Anxiety and depression   • Hypothyroid   • PCOS (polycystic ovarian syndrome)   • Chronic GERD   • Endometriosis   • Multiple gastric polyps   • S/P laparoscopic sleeve gastrectomy   • Dietary counseling   • Obesity, Class I, BMI 30-34.9   • Abnormal electrocardiogram   • Anxiety   • Vitamin D deficiency       Past Medical History:   Diagnosis Date   • Chiari I malformation (HCC)    • DDD (degenerative disc disease), cervical    • DDD (degenerative disc disease), cervical    • Disease of thyroid gland    • Dizziness    • Endometriosis    • GERD (gastroesophageal reflux disease)    • Headache    • Heart palpitations    • Hiatal hernia    • PONV (postoperative nausea and vomiting)        The following portions of the patient's history were reviewed and updated as appropriate: allergies, current medications, past family history, past medical history, past social history, past surgical history and problem list.    Vitals:    03/16/22 1622   BP: 116/74   Pulse: 70   Resp: 18   Temp: 98 °F (36.7 °C)       Physical Exam  Vitals and nursing note reviewed.   Constitutional:       Appearance: She is well-developed.   Neck:      Thyroid: No thyromegaly.   Cardiovascular:      Rate and Rhythm: Normal rate.   Pulmonary:      Effort: Pulmonary effort is normal. No respiratory distress.   Abdominal:      Palpations: Abdomen is soft.   Musculoskeletal:         General: No tenderness.   Skin:     General: Skin is warm and dry.   Neurological:      Mental Status: She is alert.   Psychiatric:         Behavior: Behavior normal.          Assessment:   Post-op, the patient is doing well.     Encounter Diagnoses   Name Primary?   • Obesity, Class I, BMI 30-34.9 Yes   • S/P laparoscopic sleeve gastrectomy    • Vitamin D deficiency    • Chronic GERD    • Anxiety and depression    • Multiple joint pain        Plan:   We did discuss GLP-1 therapy, specifically Wegovy. We discussed dosing, administration including injection site preparation with alcohol, titration, common side effects including nausea, vomiting, constipation, diarrhea, hypoglycemia, injection site reaction, headache, and abdominal pain. We discussed contraindications including pregnancy. Patient denies history or family history of MEN2 or thyroid cancer, or history of pancreatitis. her does not take insulin or a sulfonylurea. We did discuss remote chance of renal impairment as it was associated with GI symptoms in trials.     Patient will start weekly Wegovy at the 0.25mg dose subcutaneously. Valentina Anderson verbalized understanding that her will take one dose weekly, will dispose of the single use pen, and will repeat this dosing for 1 month. We discussed storing future doses in the refrigerator and her was advised to take a dose if missed ASAP if the next scheduled dose is greater than 48 hours away.     Patient both watched, and was able to repeat demonstration of administration using medication sample pen including site preparation, administration, and disposal. her will call and report any adverse effects to our group in the instance that any occur and we will advise at that time.   Encouraged patient to be sure to get plenty of lean protein per day through small frequent meals all with a protein source.   Activity restrictions: none.   Recommended patient be sure to get at least 70 grams of protein per day by eating small, frequent meals all with high lean protein choices. Be sure to limit/cut back on daily carbohydrate intake. Discussed with the patient the recommended  amount of water per day to intake- half of body weight in ounces. Reviewed vitamin requirements. Be sure to do routine exercise, 150 minutes per week minimum, including both cardio and strength training.     Instructions / Recommendations: dietary counseling recommended, recommended a daily protein intake of  grams, vitamin supplement(s) recommended, recommended exercising at least 150 minutes per week, behavior modifications recommended and instructed to call the office for concerns, questions, or problems.     The patient was instructed to follow up in 1 month .     . Total time spent during this encounter today was 25 minutes

## 2022-04-06 RX ORDER — SEMAGLUTIDE 0.5 MG/.5ML
0.5 INJECTION, SOLUTION SUBCUTANEOUS WEEKLY
Qty: 2 ML | Refills: 0 | Status: SHIPPED | OUTPATIENT
Start: 2022-04-06 | End: 2022-07-07

## 2022-04-20 ENCOUNTER — TELEMEDICINE (OUTPATIENT)
Dept: BARIATRICS/WEIGHT MGMT | Facility: CLINIC | Age: 42
End: 2022-04-20

## 2022-04-20 VITALS — BODY MASS INDEX: 32 KG/M2 | WEIGHT: 207 LBS

## 2022-04-20 DIAGNOSIS — E66.9 OBESITY, CLASS I, BMI 30-34.9: Primary | ICD-10-CM

## 2022-04-20 DIAGNOSIS — Z98.84 S/P LAPAROSCOPIC SLEEVE GASTRECTOMY: ICD-10-CM

## 2022-04-20 PROCEDURE — 99213 OFFICE O/P EST LOW 20 MIN: CPT | Performed by: NURSE PRACTITIONER

## 2022-04-20 RX ORDER — SEMAGLUTIDE 1 MG/.5ML
1 INJECTION, SOLUTION SUBCUTANEOUS WEEKLY
Qty: 2 ML | Refills: 0 | Status: SHIPPED | OUTPATIENT
Start: 2022-05-11 | End: 2022-06-08

## 2022-04-20 RX ORDER — SEMAGLUTIDE 2.4 MG/.75ML
2.4 INJECTION, SOLUTION SUBCUTANEOUS WEEKLY
Qty: 3 ML | Refills: 0 | Status: SHIPPED | OUTPATIENT
Start: 2022-07-06 | End: 2022-07-15 | Stop reason: SDUPTHER

## 2022-04-20 RX ORDER — SEMAGLUTIDE 1.7 MG/.75ML
1.7 INJECTION, SOLUTION SUBCUTANEOUS WEEKLY
Qty: 3 ML | Refills: 0 | Status: SHIPPED | OUTPATIENT
Start: 2022-06-08 | End: 2022-07-06

## 2022-04-20 NOTE — PROGRESS NOTES
[facility] BARIATRIC SURGERY  [address] [facility] [address]  [facility] [address]  [phone]  [address] [address] [phone]  Dept: [phone]  4/20/2022      Valentina Anderson.  [id number]  [id number]  1980  female    Mode of Visit: Video  Location of patient: home  You have chosen to receive care through a telehealth visit.  Does the patient consent to use a video/audio connection for your medical care today? Yes  The visit included audio and video interaction. No technical issues occurred during this visit.     Chief Complaint   Patient presents with   • Follow-up     Sleeve f/u and wegovy       BH Post-Op Bariatric Surgery:   Valentina Anderson is status post Laparoscopic Sleeve/PEH procedure, performed on 11/4/2020 who has been taking wegovy for a little over a month. She just recently was able to increase her dosage to the 0.5mg dose.  She reports no side effects. She does feel that her hunger is diminished.     HPI:   Today's weight is 93.9 kg (207 lb) pounds, today's BMI is Body mass index is 32 kg/m².,@ has a  loss of 207 pounds since the last visit and@ weight loss since surgery is 96 pounds. The patient reports a decreased portion size and loss of appetite.         Diet and Exercise: Diet history reviewed and discussed with the patient. Weight loss/gains to date discussed with the patient. The patient states they are eating 30-50 grams of protein per day. She reports eating 2 meals per day, a typical portion size of 1/2 cup, eating 1 snacks per day, drinking 5-6 or more 8-oz. glasses of water per day, no carbonated beverage consumption and exercising regularly.     Review of Systems   Constitutional: Positive for appetite change and fatigue.   HENT: Negative.    Respiratory: Negative.    Cardiovascular: Negative.    Gastrointestinal: Negative.    Neurological: Negative.    Psychiatric/Behavioral: Negative.        Patient Active  Problem List   Diagnosis   • Chiari malformation type I (HCC)   • Dizziness   • New daily persistent headache   • History of brain surgery   • Degeneration of intervertebral disc at C6-C7 level   • Bilateral occipital neuralgia   • Chronic fatigue   • Multiple joint pain   • Anxiety and depression   • Hypothyroid   • PCOS (polycystic ovarian syndrome)   • Chronic GERD   • Endometriosis   • Multiple gastric polyps   • S/P laparoscopic sleeve gastrectomy   • Dietary counseling   • Obesity, Class I, BMI 30-34.9   • Abnormal electrocardiogram   • Anxiety   • Vitamin D deficiency       Past Medical History:   Diagnosis Date   • Chiari I malformation (HCC)    • DDD (degenerative disc disease), cervical    • DDD (degenerative disc disease), cervical    • Disease of thyroid gland    • Dizziness    • Endometriosis    • GERD (gastroesophageal reflux disease)    • Headache    • Heart palpitations    • Hiatal hernia    • PONV (postoperative nausea and vomiting)        The following portions of the patient's history were reviewed and updated as appropriate: allergies, current medications, past family history, past medical history, past social history, past surgical history and problem list.    There were no vitals filed for this visit.    Physical Exam  Vitals and nursing note reviewed.   Constitutional:       Appearance: She is well-developed. She is not diaphoretic.   Pulmonary:      Effort: Pulmonary effort is normal.   Neurological:      Mental Status: She is alert and oriented to person, place, and time.   Psychiatric:         Behavior: Behavior normal.         Assessment:   Post-op, the patient is doing well.     Encounter Diagnoses   Name Primary?   • Obesity, Class I, BMI 30-34.9 Yes   • S/P laparoscopic sleeve gastrectomy        Plan:   Will continue wegovy at the 0.5mg dose and plan to increase dose monthly to 2.4mg dose as tolerated.  Encouraged patient to be sure to get plenty of lean protein per day through small  frequent meals all with a protein source.   Activity restrictions: none.   Recommended patient be sure to get at least 70 grams of protein per day by eating small, frequent meals all with high lean protein choices. Be sure to limit/cut back on daily carbohydrate intake. Discussed with the patient the recommended amount of water per day to intake- half of body weight in ounces. Reviewed vitamin requirements. Be sure to do routine exercise, 150 minutes per week minimum, including both cardio and strength training.     Instructions / Recommendations: dietary counseling recommended, recommended a daily protein intake of  grams, vitamin supplement(s) recommended, recommended exercising at least 150 minutes per week, behavior modifications recommended and instructed to call the office for concerns, questions, or problems.     The patient was instructed to follow up in 3 months .     The patient was counseled regarding. Total time spent during this encounter was 25 minutes

## 2022-04-27 ENCOUNTER — DOCUMENTATION (OUTPATIENT)
Dept: PHYSICAL THERAPY | Facility: CLINIC | Age: 42
End: 2022-04-27

## 2022-05-31 ENCOUNTER — OFFICE VISIT (OUTPATIENT)
Dept: NEUROLOGY | Facility: CLINIC | Age: 42
End: 2022-05-31

## 2022-05-31 ENCOUNTER — TRANSCRIBE ORDERS (OUTPATIENT)
Dept: ADMINISTRATIVE | Facility: HOSPITAL | Age: 42
End: 2022-05-31

## 2022-05-31 VITALS
BODY MASS INDEX: 31.71 KG/M2 | HEIGHT: 67 IN | OXYGEN SATURATION: 98 % | SYSTOLIC BLOOD PRESSURE: 110 MMHG | DIASTOLIC BLOOD PRESSURE: 74 MMHG | HEART RATE: 89 BPM | WEIGHT: 202 LBS

## 2022-05-31 DIAGNOSIS — Z86.39 HISTORY OF NON ANEMIC VITAMIN B12 DEFICIENCY: ICD-10-CM

## 2022-05-31 DIAGNOSIS — G89.29 CHRONIC NECK PAIN: ICD-10-CM

## 2022-05-31 DIAGNOSIS — M54.2 CHRONIC NECK PAIN: ICD-10-CM

## 2022-05-31 DIAGNOSIS — M54.81 OCCIPITAL NEURALGIA OF RIGHT SIDE: Primary | ICD-10-CM

## 2022-05-31 DIAGNOSIS — Z12.31 ENCOUNTER FOR SCREENING MAMMOGRAM FOR BREAST CANCER: Primary | ICD-10-CM

## 2022-05-31 DIAGNOSIS — R20.2 PARESTHESIAS: ICD-10-CM

## 2022-05-31 PROCEDURE — 99213 OFFICE O/P EST LOW 20 MIN: CPT | Performed by: PSYCHIATRY & NEUROLOGY

## 2022-05-31 NOTE — PROGRESS NOTES
Chief complaint: History of occipital neuralgia, right.  History of vitamin B12 deficiency    Patient ID: Valentina Anderson is a 42 y.o. female.    HPI: I had the pleasure of seeing your patient again today.  As you may know she is a 42-year-old female here for the management of occipital neuralgia, chronic neck pain and vitamin B12 deficiency.  She is still receiving vitamin B12 injections protocol.  She has 1 more injection and then she will go 1 month without B12 supplementation prior to the level being drawn.  She has not had any significant issues with the B12 shots.  She has been having more symptoms of occipital neuralgia, right.  She notes that she has been doing a lot more activity and her neck tends to be more painful, regularly.  She has been using different over-the-counter remedies with some success.  She does best with a combination of physical therapy and occipital nerve block if needed.    The following portions of the patient's history were reviewed and updated as appropriate: allergies, current medications, past family history, past medical history, past social history, past surgical history and problem list.    Review of Systems   Constitutional: Positive for fatigue.   HENT: Positive for sinus pressure and sinus pain. Negative for facial swelling.    Respiratory: Negative for chest tightness and shortness of breath.    Cardiovascular: Negative for chest pain, palpitations and leg swelling.   Gastrointestinal: Negative for nausea and vomiting.   Genitourinary: Negative for frequency and urgency.   Musculoskeletal: Positive for joint swelling, neck pain and neck stiffness. Negative for back pain and gait problem.   Neurological: Negative for dizziness, tremors, seizures, syncope, speech difficulty, weakness, light-headedness, numbness and headaches.   Hematological: Does not bruise/bleed easily.   Psychiatric/Behavioral: Negative for agitation, behavioral problems, confusion, hallucinations,  self-injury and suicidal ideas. The patient is not nervous/anxious.       I have reviewed the review of systems above performed by my medical assistant.      Vitals:    22 0804   BP: 110/74   Pulse: 89   SpO2: 98%       Neurologic Exam     Mental Status   Oriented to person, place, and time.   Concentration: normal.   Level of consciousness: alert  Knowledge: consistent with education (No deficits found.).     Cranial Nerves     CN II   Visual fields full to confrontation.     CN III, IV, VI   Pupils are equal, round, and reactive to light.  Extraocular motions are normal.   CN III: no CN III palsy  CN VI: no CN VI palsy    CN V   Facial sensation intact.     CN VII   Facial expression full, symmetric.     CN VIII   CN VIII normal.     CN IX, X   CN IX normal.   CN X normal.     CN XI   CN XI normal.     CN XII   CN XII normal.     Motor Exam     Strength   Right neck flexion: 5/5  Left neck flexion: 5/5  Right neck extension: 5/5  Left neck extension: 5/5  Right deltoid: 5/5  Left deltoid: 5/5  Right biceps: 5/5  Left biceps: 5/5  Right triceps: 5/5  Left triceps: 5/5  Right wrist flexion: 5/5  Left wrist flexion: 5/5  Right wrist extension: 5/5  Left wrist extension: 5/5  Right interossei: 5/5  Left interossei: 5/5  Right abdominals: 5/5  Left abdominals: 5/5  Right iliopsoas: 5/5  Left iliopsoas: 5/5  Right quadriceps: 5/5  Left quadriceps: 5/5  Right hamstrin/5  Left hamstrin/5  Right glutei: 5/5  Left glutei: 5/5  Right anterior tibial: 5/5  Left anterior tibial: 5/5  Right posterior tibial: 5/5  Left posterior tibial: 5/5  Right peroneal: 5/5  Left peroneal: 5/5  Right gastroc: 5/5  Left gastroc: 5/5    Sensory Exam   Light touch normal.   Vibration normal.     Gait, Coordination, and Reflexes     Gait  Gait: normal    Reflexes   Right brachioradialis: 2+  Left brachioradialis: 2+  Right biceps: 2+  Left biceps: 2+  Right triceps: 2+  Left triceps: 2+  Right patellar: 2+  Left patellar: 2+  Right  achilles: 2+  Left achilles: 2+  Right : 2+  Left : 2+Station is normal.       Physical Exam  Vitals reviewed.   Constitutional:       Appearance: She is well-developed.   HENT:      Head: Normocephalic and atraumatic.   Eyes:      Extraocular Movements: EOM normal.      Pupils: Pupils are equal, round, and reactive to light.   Cardiovascular:      Rate and Rhythm: Normal rate and regular rhythm.   Pulmonary:      Breath sounds: Normal breath sounds.   Musculoskeletal:         General: Normal range of motion.   Skin:     General: Skin is warm.   Neurological:      Mental Status: She is oriented to person, place, and time.      Gait: Gait is intact.      Deep Tendon Reflexes:      Reflex Scores:       Tricep reflexes are 2+ on the right side and 2+ on the left side.       Bicep reflexes are 2+ on the right side and 2+ on the left side.       Brachioradialis reflexes are 2+ on the right side and 2+ on the left side.       Patellar reflexes are 2+ on the right side and 2+ on the left side.       Achilles reflexes are 2+ on the right side and 2+ on the left side.        Procedures    Assessment/Plan: We are going to continue the B12 injection protocol.  She will have a level checked proximately 1 month from her last B12 injection.  We are going to schedule physical therapy for her neck issues.  She does well with dry needling.  Also we will schedule her for a follow-up occipital nerve block, right.  A total of 20 minutes was spent face-to-face with patient today.  Of that greater than 50% of this time was spent discussing signs and symptoms of occipital neuralgia, paresthesias, B12 deficiency, patient education, plan of care and prognosis.       Diagnoses and all orders for this visit:    1. Occipital neuralgia of right side (Primary)  -     Ambulatory Referral to Physical Therapy    2. Paresthesias  -     Vitamin B12    3. History of non anemic vitamin B12 deficiency  -     Vitamin B12    4. Chronic neck  pain  -     Ambulatory Referral to Physical Therapy           Arnulfo Judd, II, MD

## 2022-06-06 ENCOUNTER — PROCEDURE VISIT (OUTPATIENT)
Dept: NEUROLOGY | Facility: CLINIC | Age: 42
End: 2022-06-06

## 2022-06-06 DIAGNOSIS — M54.81 OCCIPITAL NEURALGIA OF RIGHT SIDE: Primary | ICD-10-CM

## 2022-06-06 PROCEDURE — 64450 NJX AA&/STRD OTHER PN/BRANCH: CPT | Performed by: PSYCHIATRY & NEUROLOGY

## 2022-06-06 RX ORDER — METHYLPREDNISOLONE ACETATE 40 MG/ML
40 INJECTION, SUSPENSION INTRA-ARTICULAR; INTRALESIONAL; INTRAMUSCULAR; SOFT TISSUE ONCE
Status: COMPLETED | OUTPATIENT
Start: 2022-06-06 | End: 2022-06-06

## 2022-06-06 RX ORDER — LIDOCAINE HYDROCHLORIDE 20 MG/ML
5 INJECTION, SOLUTION INFILTRATION; PERINEURAL ONCE
Status: COMPLETED | OUTPATIENT
Start: 2022-06-06 | End: 2022-06-06

## 2022-06-06 RX ADMIN — METHYLPREDNISOLONE ACETATE 40 MG: 40 INJECTION, SUSPENSION INTRA-ARTICULAR; INTRALESIONAL; INTRAMUSCULAR; SOFT TISSUE at 14:56

## 2022-06-06 RX ADMIN — LIDOCAINE HYDROCHLORIDE 5 ML: 20 INJECTION, SOLUTION INFILTRATION; PERINEURAL at 14:54

## 2022-06-06 NOTE — PROGRESS NOTES
Procedure   Nerve Block    Date/Time: 6/6/2022 3:12 PM  Performed by: Arnulfo Judd II, MD  Authorized by: Arnulfo Judd II, MD   Consent: Verbal consent obtained. Written consent obtained.  Risks and benefits: risks, benefits and alternatives were discussed  Consent given by: patient  Patient understanding: patient states understanding of the procedure being performed  Patient consent: the patient's understanding of the procedure matches consent given  Procedure consent: procedure consent matches procedure scheduled  Required items: required blood products, implants, devices, and special equipment available  Patient identity confirmed: verbally with patient and provided demographic data  Indications comments: occipital neuralgia  Body area: head  Nerve: lesser occipital  Laterality: right    Sedation:  Patient sedated: no    Patient position: sitting  Needle size: 27 G  Location technique: anatomical landmarks  Patient tolerance: Patient tolerated the procedure well with no immediate complications  Comments: 25 cc of both Depo-Medrol and 2% Xylocaine without epinephrine were drawn into 1, 3 cc syringe.  The full cc of this mixture was injected at the site of the lesser occipital nerve, right.             Occipital nerve block

## 2022-06-22 DIAGNOSIS — M54.81 OCCIPITAL NEURALGIA OF RIGHT SIDE: Primary | ICD-10-CM

## 2022-06-22 DIAGNOSIS — G89.29 CHRONIC NECK PAIN: ICD-10-CM

## 2022-06-22 DIAGNOSIS — M54.2 CHRONIC NECK PAIN: ICD-10-CM

## 2022-07-07 ENCOUNTER — OFFICE VISIT (OUTPATIENT)
Dept: NEUROLOGY | Facility: CLINIC | Age: 42
End: 2022-07-07

## 2022-07-07 VITALS
BODY MASS INDEX: 30.29 KG/M2 | DIASTOLIC BLOOD PRESSURE: 72 MMHG | SYSTOLIC BLOOD PRESSURE: 110 MMHG | OXYGEN SATURATION: 99 % | HEIGHT: 67 IN | WEIGHT: 193 LBS | HEART RATE: 87 BPM

## 2022-07-07 DIAGNOSIS — M54.2 CHRONIC NECK PAIN: ICD-10-CM

## 2022-07-07 DIAGNOSIS — G89.29 CHRONIC NECK PAIN: ICD-10-CM

## 2022-07-07 DIAGNOSIS — M54.81 OCCIPITAL NEURALGIA OF RIGHT SIDE: Primary | ICD-10-CM

## 2022-07-07 PROCEDURE — 99214 OFFICE O/P EST MOD 30 MIN: CPT | Performed by: PSYCHIATRY & NEUROLOGY

## 2022-07-07 NOTE — PROGRESS NOTES
Chief Complaint   Patient presents with   • occipital neuralgia of right side        Patient ID: Valentina Anderson is a 42 y.o. female.    HPI: I had the pleasure of seeing your patient today.  As you may know she is a 42-year-old female here for the management of occipital neuralgia.  She is having more issues.  She states that she has noted significant neck pain radiating into the right occipital head region.  She denies any focal weakness or numbness of her arms or legs.  No new headache symptoms.  It is a daily occurrence for her.  She has difficulty staying asleep due to these issues.  She is scheduled for dry needling with her physical therapist soon.    The following portions of the patient's history were reviewed and updated as appropriate: allergies, current medications, past family history, past medical history, past social history, past surgical history and problem list.    Review of Systems   Neurological: Positive for light-headedness and headaches. Negative for dizziness, tremors, seizures, syncope, speech difficulty, weakness and numbness.   Hematological: Does not bruise/bleed easily.   Psychiatric/Behavioral: Positive for sleep disturbance (staying asleep). Negative for agitation, behavioral problems, confusion, decreased concentration, hallucinations, self-injury and suicidal ideas. The patient is nervous/anxious.       I have reviewed the review of systems above performed by my medical assistant.       Vitals:    07/07/22 0836   BP: 110/72   Pulse: 87   SpO2: 99%       Neurologic Exam     Mental Status   Oriented to person, place, and time.   Concentration: normal.   Level of consciousness: alert  Knowledge: consistent with education (No deficits found.).     Cranial Nerves     CN II   Visual fields full to confrontation.     CN III, IV, VI   Pupils are equal, round, and reactive to light.  Extraocular motions are normal.   CN III: no CN III palsy  CN VI: no CN VI palsy    CN V   Facial sensation  intact.     CN VII   Facial expression full, symmetric.     CN VIII   CN VIII normal.     CN IX, X   CN IX normal.   CN X normal.     CN XI   CN XI normal.     CN XII   CN XII normal.     Motor Exam     Strength   Right neck flexion: 5/5  Left neck flexion: 5/5  Right neck extension: 5/5  Left neck extension: 5/5  Right deltoid: 5/5  Left deltoid: 5/5  Right biceps: 5/5  Left biceps: 5/5  Right triceps: 5/5  Left triceps: 5/5  Right wrist flexion: 5/5  Left wrist flexion: 5/5  Right wrist extension: 5/5  Left wrist extension: 5/5  Right interossei: 5/5  Left interossei: 5/5  Right abdominals: 5/5  Left abdominals: 5/5  Right iliopsoas: 5/5  Left iliopsoas: 5/5  Right quadriceps: 5/5  Left quadriceps: 5/5  Right hamstrin/5  Left hamstrin/5  Right glutei: 5/5  Left glutei: 5/5  Right anterior tibial: 5/5  Left anterior tibial: 5/5  Right posterior tibial: 5/5  Left posterior tibial: 5/5  Right peroneal: 5/5  Left peroneal: 5/5  Right gastroc: 5/5  Left gastroc: 5/5    Sensory Exam   Light touch normal.   Vibration normal.     Gait, Coordination, and Reflexes     Gait  Gait: normal    Reflexes   Right brachioradialis: 2+  Left brachioradialis: 2+  Right biceps: 2+  Left biceps: 2+  Right triceps: 2+  Left triceps: 2+  Right patellar: 2+  Left patellar: 2+  Right achilles: 2+  Left achilles: 2+  Right : 2+  Left : 2+Station is normal.       Physical Exam  Vitals reviewed.   Constitutional:       Appearance: She is well-developed.   HENT:      Head: Normocephalic and atraumatic.   Eyes:      Extraocular Movements: EOM normal.      Pupils: Pupils are equal, round, and reactive to light.   Cardiovascular:      Rate and Rhythm: Normal rate and regular rhythm.   Pulmonary:      Breath sounds: Normal breath sounds.   Musculoskeletal:         General: Normal range of motion.   Skin:     General: Skin is warm.   Neurological:      Mental Status: She is oriented to person, place, and time.      Gait: Gait is  intact.      Deep Tendon Reflexes:      Reflex Scores:       Tricep reflexes are 2+ on the right side and 2+ on the left side.       Bicep reflexes are 2+ on the right side and 2+ on the left side.       Brachioradialis reflexes are 2+ on the right side and 2+ on the left side.       Patellar reflexes are 2+ on the right side and 2+ on the left side.       Achilles reflexes are 2+ on the right side and 2+ on the left side.        Procedures    Assessment/Plan: I do agree with dry needling via physical therapy.  I also would recommend follow-up occipital nerve blocking.  We will see her back here in the clinic in approximately 1 month or sooner if needed.  A total of 30 minutes was spent face-to-face with the patient today.  Of that greater than 50% of this time was spent discussing signs and symptoms of occipital neuralgia, patient education, plan of care and prognosis.       Diagnoses and all orders for this visit:    1. Occipital neuralgia of right side (Primary)    2. Chronic neck pain           Arnulfo Judd II, MD

## 2022-07-14 ENCOUNTER — TRANSCRIBE ORDERS (OUTPATIENT)
Dept: LAB | Facility: HOSPITAL | Age: 42
End: 2022-07-14

## 2022-07-14 ENCOUNTER — LAB (OUTPATIENT)
Dept: LAB | Facility: HOSPITAL | Age: 42
End: 2022-07-14

## 2022-07-14 DIAGNOSIS — D64.9 ANEMIA, UNSPECIFIED TYPE: ICD-10-CM

## 2022-07-14 DIAGNOSIS — E55.9 VITAMIN D2 DEFICIENCY: ICD-10-CM

## 2022-07-14 DIAGNOSIS — D64.9 ANEMIA, UNSPECIFIED TYPE: Primary | ICD-10-CM

## 2022-07-14 LAB
25(OH)D3 SERPL-MCNC: 78.9 NG/ML (ref 30–100)
ALBUMIN SERPL-MCNC: 4.2 G/DL (ref 3.5–5.2)
ALBUMIN/GLOB SERPL: 1.5 G/DL
ALP SERPL-CCNC: 38 U/L (ref 39–117)
ALT SERPL W P-5'-P-CCNC: 11 U/L (ref 1–33)
ANION GAP SERPL CALCULATED.3IONS-SCNC: 12.3 MMOL/L (ref 5–15)
AST SERPL-CCNC: 14 U/L (ref 1–32)
BASOPHILS # BLD AUTO: 0.05 10*3/MM3 (ref 0–0.2)
BASOPHILS NFR BLD AUTO: 0.9 % (ref 0–1.5)
BILIRUB SERPL-MCNC: 0.5 MG/DL (ref 0–1.2)
BUN SERPL-MCNC: 6 MG/DL (ref 6–20)
BUN/CREAT SERPL: 6.8 (ref 7–25)
CALCIUM SPEC-SCNC: 9.2 MG/DL (ref 8.6–10.5)
CHLORIDE SERPL-SCNC: 106 MMOL/L (ref 98–107)
CHOLEST SERPL-MCNC: 136 MG/DL (ref 0–200)
CO2 SERPL-SCNC: 21.7 MMOL/L (ref 22–29)
CREAT SERPL-MCNC: 0.88 MG/DL (ref 0.57–1)
DEPRECATED RDW RBC AUTO: 41.6 FL (ref 37–54)
EGFRCR SERPLBLD CKD-EPI 2021: 83.2 ML/MIN/1.73
EOSINOPHIL # BLD AUTO: 0.09 10*3/MM3 (ref 0–0.4)
EOSINOPHIL NFR BLD AUTO: 1.6 % (ref 0.3–6.2)
ERYTHROCYTE [DISTWIDTH] IN BLOOD BY AUTOMATED COUNT: 12.8 % (ref 12.3–15.4)
GLOBULIN UR ELPH-MCNC: 2.8 GM/DL
GLUCOSE SERPL-MCNC: 71 MG/DL (ref 65–99)
HCT VFR BLD AUTO: 41.3 % (ref 34–46.6)
HDLC SERPL-MCNC: 42 MG/DL (ref 40–60)
HGB BLD-MCNC: 13.9 G/DL (ref 12–15.9)
IMM GRANULOCYTES # BLD AUTO: 0.01 10*3/MM3 (ref 0–0.05)
IMM GRANULOCYTES NFR BLD AUTO: 0.2 % (ref 0–0.5)
LDLC SERPL CALC-MCNC: 83 MG/DL (ref 0–100)
LDLC/HDLC SERPL: 2 {RATIO}
LYMPHOCYTES # BLD AUTO: 1.87 10*3/MM3 (ref 0.7–3.1)
LYMPHOCYTES NFR BLD AUTO: 34.2 % (ref 19.6–45.3)
MCH RBC QN AUTO: 30.2 PG (ref 26.6–33)
MCHC RBC AUTO-ENTMCNC: 33.7 G/DL (ref 31.5–35.7)
MCV RBC AUTO: 89.6 FL (ref 79–97)
MONOCYTES # BLD AUTO: 0.39 10*3/MM3 (ref 0.1–0.9)
MONOCYTES NFR BLD AUTO: 7.1 % (ref 5–12)
NEUTROPHILS NFR BLD AUTO: 3.05 10*3/MM3 (ref 1.7–7)
NEUTROPHILS NFR BLD AUTO: 56 % (ref 42.7–76)
NRBC BLD AUTO-RTO: 0 /100 WBC (ref 0–0.2)
PLATELET # BLD AUTO: 298 10*3/MM3 (ref 140–450)
PMV BLD AUTO: 10.5 FL (ref 6–12)
POTASSIUM SERPL-SCNC: 3.8 MMOL/L (ref 3.5–5.2)
PROT SERPL-MCNC: 7 G/DL (ref 6–8.5)
RBC # BLD AUTO: 4.61 10*6/MM3 (ref 3.77–5.28)
SODIUM SERPL-SCNC: 140 MMOL/L (ref 136–145)
T4 FREE SERPL-MCNC: 1.82 NG/DL (ref 0.93–1.7)
TRIGL SERPL-MCNC: 51 MG/DL (ref 0–150)
TSH SERPL DL<=0.05 MIU/L-ACNC: 1.66 UIU/ML (ref 0.27–4.2)
VIT B12 BLD-MCNC: 676 PG/ML (ref 211–946)
VLDLC SERPL-MCNC: 11 MG/DL (ref 5–40)
WBC NRBC COR # BLD: 5.46 10*3/MM3 (ref 3.4–10.8)

## 2022-07-14 PROCEDURE — 82607 VITAMIN B-12: CPT | Performed by: PSYCHIATRY & NEUROLOGY

## 2022-07-14 PROCEDURE — 80061 LIPID PANEL: CPT

## 2022-07-14 PROCEDURE — 80050 GENERAL HEALTH PANEL: CPT

## 2022-07-14 PROCEDURE — 84439 ASSAY OF FREE THYROXINE: CPT

## 2022-07-14 PROCEDURE — 36415 COLL VENOUS BLD VENIPUNCTURE: CPT | Performed by: PSYCHIATRY & NEUROLOGY

## 2022-07-14 PROCEDURE — 82306 VITAMIN D 25 HYDROXY: CPT

## 2022-07-15 ENCOUNTER — OFFICE VISIT (OUTPATIENT)
Dept: BARIATRICS/WEIGHT MGMT | Facility: CLINIC | Age: 42
End: 2022-07-15

## 2022-07-15 VITALS
RESPIRATION RATE: 17 BRPM | BODY MASS INDEX: 29.88 KG/M2 | HEIGHT: 67 IN | WEIGHT: 190.4 LBS | SYSTOLIC BLOOD PRESSURE: 112 MMHG | HEART RATE: 92 BPM | DIASTOLIC BLOOD PRESSURE: 75 MMHG | TEMPERATURE: 98.4 F

## 2022-07-15 DIAGNOSIS — K21.9 CHRONIC GERD: ICD-10-CM

## 2022-07-15 DIAGNOSIS — E66.3 OVERWEIGHT (BMI 25.0-29.9): ICD-10-CM

## 2022-07-15 DIAGNOSIS — F41.9 ANXIETY AND DEPRESSION: ICD-10-CM

## 2022-07-15 DIAGNOSIS — Z98.84 S/P LAPAROSCOPIC SLEEVE GASTRECTOMY: Primary | ICD-10-CM

## 2022-07-15 DIAGNOSIS — F32.A ANXIETY AND DEPRESSION: ICD-10-CM

## 2022-07-15 PROBLEM — E66.9 OBESITY, CLASS I, BMI 30-34.9: Status: RESOLVED | Noted: 2021-02-19 | Resolved: 2022-07-15

## 2022-07-15 PROBLEM — E66.811 OBESITY, CLASS I, BMI 30-34.9: Status: RESOLVED | Noted: 2021-02-19 | Resolved: 2022-07-15

## 2022-07-15 PROCEDURE — 99213 OFFICE O/P EST LOW 20 MIN: CPT | Performed by: NURSE PRACTITIONER

## 2022-07-15 RX ORDER — SEMAGLUTIDE 2.4 MG/.75ML
2.4 INJECTION, SOLUTION SUBCUTANEOUS WEEKLY
Qty: 3 ML | Refills: 0 | Status: SHIPPED | OUTPATIENT
Start: 2022-07-15 | End: 2022-07-15 | Stop reason: SDUPTHER

## 2022-07-15 RX ORDER — OMEPRAZOLE 20 MG/1
20 CAPSULE, DELAYED RELEASE ORAL DAILY
COMMUNITY

## 2022-07-15 RX ORDER — SEMAGLUTIDE 2.4 MG/.75ML
2.4 INJECTION, SOLUTION SUBCUTANEOUS WEEKLY
Qty: 9 ML | Refills: 1 | Status: SHIPPED | OUTPATIENT
Start: 2022-07-15 | End: 2023-01-05 | Stop reason: SDUPTHER

## 2022-07-15 NOTE — PROGRESS NOTES
[facility] BARIATRIC SURGERY  [address] [facility] [address]  [facility] [address]  [phone]  [address] [id number] [phone]  Dept: [phone]  7/15/2022      Valentina Anderson.  [id number]  [id number]  1980  female      Chief Complaint   Patient presents with   • Follow-up     1 yr  sleeve f/u       BH Post-Op Bariatric Surgery:   Valentina Anderson is status post Laparoscopic Sleeve/PEH procedure, performed on 11/4/20 who has been taking wegovy since 3/16/22 and has lost 24lb since starting the medication. She denies headaches. She has seen mild heartburn and nausea after increasing her dose but reports none this last week..     HPI:   Today's weight is 86.4 kg (190 lb 6.4 oz) pounds, today's BMI is Body mass index is 29.43 kg/m².,@ has a  loss of 17 pounds since the last visit and@ weight loss since surgery is 113 pounds. The patient reports a decreased portion size and loss of appetite.      Valentina Anderson denies nausea, vomiting, reflux and reports that she is doing well     Diet and Exercise: Diet history reviewed and discussed with the patient. Weight loss/gains to date discussed with the patient. The patient states they are eating 60 grams of protein per day. She reports eating 3 meals per day, a typical portion size of 1/2 cup, eating 1-2 snacks per day, drinking 5-6 or more 8-oz. glasses of water per day, no carbonated beverage consumption and exercising regularly- walking most days    Review of Systems   Constitutional: Positive for appetite change. Negative for fatigue and unexpected weight change.   HENT: Negative.    Eyes: Negative.    Respiratory: Negative.    Cardiovascular: Negative.  Negative for leg swelling.   Gastrointestinal: Negative for abdominal distention, abdominal pain, constipation, diarrhea, nausea and vomiting.   Genitourinary: Negative for difficulty urinating, frequency and urgency.   Musculoskeletal:  Negative for back pain.   Skin: Negative.    Psychiatric/Behavioral: Negative.    All other systems reviewed and are negative.      Patient Active Problem List   Diagnosis   • Chiari malformation type I (HCC)   • Dizziness   • New daily persistent headache   • History of brain surgery   • Degeneration of intervertebral disc at C6-C7 level   • Bilateral occipital neuralgia   • Chronic fatigue   • Multiple joint pain   • Anxiety and depression   • Hypothyroid   • PCOS (polycystic ovarian syndrome)   • Endometriosis   • Multiple gastric polyps   • S/P laparoscopic sleeve gastrectomy   • Dietary counseling   • Abnormal electrocardiogram   • Anxiety   • Vitamin D deficiency   • Overweight (BMI 25.0-29.9)       Past Medical History:   Diagnosis Date   • Chiari I malformation (HCC)    • DDD (degenerative disc disease), cervical    • DDD (degenerative disc disease), cervical    • Disease of thyroid gland    • Dizziness    • Endometriosis    • GERD (gastroesophageal reflux disease)    • Headache    • Heart palpitations    • Hiatal hernia    • PONV (postoperative nausea and vomiting)        The following portions of the patient's history were reviewed and updated as appropriate: allergies, current medications, past family history, past medical history, past social history, past surgical history and problem list.    Vitals:    07/15/22 1502   BP: 112/75   Pulse: 92   Resp: 17   Temp: 98.4 °F (36.9 °C)       Physical Exam  Vitals and nursing note reviewed.   Constitutional:       Appearance: She is well-developed.   Neck:      Thyroid: No thyromegaly.   Cardiovascular:      Rate and Rhythm: Normal rate.   Pulmonary:      Effort: Pulmonary effort is normal. No respiratory distress.   Abdominal:      Palpations: Abdomen is soft.   Musculoskeletal:         General: No tenderness.   Skin:     General: Skin is warm and dry.   Neurological:      Mental Status: She is alert.   Psychiatric:         Behavior: Behavior normal.          Assessment:   Post-op, the patient is doing well.     Encounter Diagnoses   Name Primary?   • S/P laparoscopic sleeve gastrectomy Yes   • Chronic GERD    • Anxiety and depression    • Overweight (BMI 25.0-29.9)        Plan:   We will continue wegovy.  Encouraged patient to be sure to get plenty of lean protein per day through small frequent meals all with a protein source.   Activity restrictions: none.   Recommended patient be sure to get at least 70 grams of protein per day by eating small, frequent meals all with high lean protein choices. Be sure to limit/cut back on daily carbohydrate intake. Discussed with the patient the recommended amount of water per day to intake- half of body weight in ounces. Reviewed vitamin requirements. Be sure to do routine exercise, 150 minutes per week minimum, including both cardio and strength training.     Instructions / Recommendations: dietary counseling recommended, recommended a daily protein intake of  grams, vitamin supplement(s) recommended, recommended exercising at least 150 minutes per week, behavior modifications recommended and instructed to call the office for concerns, questions, or problems.     The patient was instructed to follow up in 6 months .      Total time spent during this encounter today was 25 minutes

## 2022-07-18 ENCOUNTER — OFFICE VISIT (OUTPATIENT)
Dept: OTOLARYNGOLOGY | Facility: CLINIC | Age: 42
End: 2022-07-18

## 2022-07-18 ENCOUNTER — TREATMENT (OUTPATIENT)
Dept: PHYSICAL THERAPY | Facility: CLINIC | Age: 42
End: 2022-07-18

## 2022-07-18 VITALS — TEMPERATURE: 97.1 F | BODY MASS INDEX: 29.76 KG/M2 | WEIGHT: 189.6 LBS | HEIGHT: 67 IN

## 2022-07-18 DIAGNOSIS — R29.3 POOR POSTURE: ICD-10-CM

## 2022-07-18 DIAGNOSIS — M54.2 CHRONIC NECK PAIN: Primary | ICD-10-CM

## 2022-07-18 DIAGNOSIS — R29.898 NECK TIGHTNESS: ICD-10-CM

## 2022-07-18 DIAGNOSIS — T16.2XXD FOREIGN BODY OF LEFT EAR, SUBSEQUENT ENCOUNTER: Primary | ICD-10-CM

## 2022-07-18 DIAGNOSIS — G89.29 CHRONIC NECK PAIN: Primary | ICD-10-CM

## 2022-07-18 PROCEDURE — 97161 PT EVAL LOW COMPLEX 20 MIN: CPT | Performed by: PHYSICAL THERAPIST

## 2022-07-18 PROCEDURE — 20561 NDL INSJ W/O NJX 3+ MUSC: CPT | Performed by: PHYSICAL THERAPIST

## 2022-07-18 PROCEDURE — 92504 EAR MICROSCOPY EXAMINATION: CPT | Performed by: OTOLARYNGOLOGY

## 2022-07-18 PROCEDURE — 99212 OFFICE O/P EST SF 10 MIN: CPT | Performed by: OTOLARYNGOLOGY

## 2022-07-18 NOTE — PROGRESS NOTES
"  Physical Therapy Initial Evaluation and Plan of Care    Patient: Valentina Anderson   : 1980  Diagnosis/ICD-10 Code:  Chronic neck pain [M54.2, G89.29]  Referring practitioner: Arnulfo Judd II, MD  Date of Initial Visit: 2022  Today's Date: 2022  Patient seen for 1 sessions           Subjective Questionnaire: NDI:16/50 = 32% Disability      Subjective Evaluation    History of Present Illness  Mechanism of injury: The patient presents to physical therapy with complaints of right sided neck pain that has been present for about 3 years without a specific [place]. Her pain is located in the right side of her neck. Her pain does radiate down to mid humerus level on the right. Her pain is increased with sleeping, with repetitive movement of her head/neck, and driving. She has benefited from occipital nerve blocks (last one in 2022) and dry needling in the past. She would like to try dry needling in conjunction with her nerve block. She experiences occipital headaches daily that is primarily a \"dull ache\".    MRI from 2019:  CONCLUSION:     1. No evidence of cerebellar tonsillar ectopia.  Expected postsurgical changes from Chiari     decompression surgery.    2. C6-7:  Broad disc bulge and central protrusion effacing the ventral thecal sac with a small     portion of disc abutting the central cord and a small amount of inferior disc extrusion effacing     the ventral thecal sac.    3. Please see above detailed comment.        Patient Occupation: Data abstraction for [employer]  Current pain ratin  At best pain ratin  At worst pain rating: 10    Patient Goals  Patient goal: The patient would like to have less pain and be able to swim.           Objective          Static Posture     Head  Forward.    Shoulders  Rounded.    Tenderness     Additional Tenderness Details  Tenderness in right upper trapezius, right levator scapulae, and right sided cervical paraspinals    Neurological " Testing     Additional Neurological Details  Sensation to light touch intact and equal bilaterally from C2-T1 dermatomal level.    Active Range of Motion   Cervical/Thoracic Spine   Cervical    Flexion: 45 degrees   Extension: 45 degrees   Left lateral flexion: 25 degrees with pain  Right lateral flexion: 28 degrees   Left rotation: 58 degrees with pain  Right rotation: 55 degrees     Strength/Myotome Testing     Left Shoulder     Planes of Motion   Flexion: 4+   Abduction: 4+   External rotation at 0°: 5     Right Shoulder     Planes of Motion   Flexion: 4+   Abduction: 4+   External rotation at 0°: 5     Left Elbow   Flexion: 5    Right Elbow   Flexion: 5    Left Wrist/Hand   Wrist extension: 5    Right Wrist/Hand   Wrist extension: 5      See Exercise, Manual, and Modality Logs for complete treatment.     Assessment & Plan     Assessment  Impairments: abnormal muscle tone, abnormal or restricted ROM, activity intolerance, impaired physical strength, lacks appropriate home exercise program and pain with function  Functional Limitations: carrying objects, lifting, pulling, pushing, uncomfortable because of pain and unable to perform repetitive tasks  Assessment details: The patient presents to physical therapy with complaints of right sided neck pain with previous MRI (2019) of her cervical spine which revealed a C6-7 disc extrusion. She presents with associated cervical stiffness, tenderness to palpation, and functional deficits (NDI).  She would benefit from skilled physical therapy as described below to address these limitations and allow the patient to return to her prior level of function.  Prognosis: good    Goals  Plan Goals: CERVICAL PROBLEMS    1. The patient has limited ROM.    LTG 1: 12 weeks:  The patient will demonstrate 65° of bilateral cervical spine rotation and 40° of bilateral cervical side bending in order to adequately monitor blind spots while driving and improve ability to perform activities  of daily living.    STATUS:  New   TREATMENT:  Manual therapy, therapeutic exercise, home exercise instruction, cervical traction, and modalities as needed to include: moist heat, electrical stimulation, and ultrasound.     2. The patient has complaints of pain.   LTG 2: 12 weeks:  The patient will report 1/10 pain in order to more easily tolerate activities of daily living and improve sleep quality.    STATUS:  New   STG 2a: 6 weeks:  The patient will report 3/10 pain.    STATUS:  New   TREATMENT: Manual therapy, therapeutic exercise, home exercise instruction, cervical traction, and modalities as needed to include: moist heat, electrical stimulation, and ultrasound.    3. The patient reports radicular symptoms in the right upper extremity.   LTG 3: 12 weeks:  The patient will report a decrease in radicular symptoms in the right upper extremity by 75%.    STATUS:  New   STG 3a: 6 weeks:  The patient will report a decrease in radicular symptoms in the right upper extremity by 50%.    STATUS:  New   TREATMENT: Manual therapy, therapeutic exercise, home exercise instruction, cervical traction, and modalities as needed to include: moist heat, electrical stimulation, and ultrasound.    4. Carrying, Moving, and Handling Objects Functional Limitation     LTG 4: 12 weeks:  The patient will demonstrate 10% limitation by achieving a score of 5/50 on the Neck Disability Index.    STATUS:  New   STG 4a: 6 weeks:  The patient will demonstrate 20% limitation by achieving a score of 10/50 on the Neck Disability Index.      STATUS:  New   TREATMENT:  Manual therapy, therapeutic exercise, home exercise instruction, and modalities as needed to include: moist heat, electrical stimulation, and ultrasound.    Plan  Therapy options: will be seen for skilled therapy services  Planned modality interventions: cryotherapy, electrical stimulation/Russian stimulation, TENS, traction, ultrasound and dry needling  Planned therapy interventions:  ADL retraining, soft tissue mobilization, strengthening, stretching, therapeutic activities, joint mobilization, home exercise program, functional ROM exercises, flexibility, body mechanics training, postural training, neuromuscular re-education, manual therapy, motor coordination training, spinal/joint mobilization and IADL retraining  Frequency: 1x week  Duration in weeks: 12  Treatment plan discussed with: patient        Visit Diagnoses:    ICD-10-CM ICD-9-CM   1. Chronic neck pain  M54.2 723.1    G89.29 338.29   2. Poor posture  R29.3 781.92   3. Neck tightness  R29.898 723.9       History # of Personal Factors and/or Comorbidities: LOW (0)  Examination of Body System(s): # of elements: LOW (1-2)  Clinical Presentation: STABLE   Clinical Decision Making: LOW       Timed:         Manual Therapy:    0     mins  64406;     Therapeutic Exercise:    0     mins  49595;     Neuromuscular Geoff:    0    mins  17305;    Therapeutic Activity:     0     mins  80417;     Gait Trainin     mins  01959;     Ultrasound:     0     mins  57311;    Ionto                               0    mins   79306  Self Care                       0     mins   94936  Canalith Repos    0     mins 72594      Un-Timed:  Electrical Stimulation:    0     mins  06871 ( );  Dry Needling     10     mins self-pay  Traction     0     mins 90332  Low Eval     25     Mins  62732  Mod Eval     0     Mins  56381  High Eval                       0     Mins  00494  Re-Eval                           0    mins  32127    Timed Treatment:   0   mins   Total Treatment:     35   mins    PT SIGNATURE: Efrain Leiva PT    Electronically signed 2022    [place] License: PT - [id number]      Initial Certification  Certification Period: 2022 thru 10/15/2022  I certify that the therapy services are furnished while this patient is under my care.  The services outlined above are required by this patient, and will be reviewed every 90 days.     PHYSICIAN:  Arnulfo Judd II, MD  NPI: [id number]      DATE:     Please sign and return via fax to [phone]. Thank you, [facility].

## 2022-07-18 NOTE — PROGRESS NOTES
Patient Name: Valentina Anderson   Visit Date: 07/18/2022   Patient ID: [id number]  Provider: Tahir Dunn MD    Sex: female  Location: [facility]   YOB: 1980  Location Address: [address] [place] [address], [address] [place] [address],?[place]?[phone]    Primary Care Provider Anna Carias APRN  Location Phone: [phone]    Referring Provider: No ref. provider found        Chief Complaint  Hair in ear?    History of Present Illness  Valentina Anderson is a 42 y.o. female who presents to [facility] today as a consult from No ref. provider found for evaluation of her ears.  She was last seen on 3/29/2019 at which time she had been experiencing severe right-sided otalgia that radiated to her maxillary dentition and was concerning for myofascial muscle pain/TMJ.  She tells me that sometime last week she felt as though she had something in her left ear.  She noticed a crunching sound when she would move her ear or ear canal and also with chewing.  It has not been associated with any otalgia, hearing loss, tinnitus, or vertigo.  She was seen by her PCP who flushed the left ear canal after seeing hair present in the medial canal.    Past Medical History:   Diagnosis Date   • Chiari I malformation (HCC)    • DDD (degenerative disc disease), cervical    • DDD (degenerative disc disease), cervical    • Disease of thyroid gland    • Dizziness    • Endometriosis    • GERD (gastroesophageal reflux disease)    • Headache    • Heart palpitations    • Hiatal hernia    • PONV (postoperative nausea and vomiting)        Past Surgical History:   Procedure Laterality Date   • ANKLE SURGERY Right 2014    X2   • CERVICAL CHIARI DECOMPRESSION POSTERIOR N/A 10/25/2016    Procedure: CERVICAL CHIARI DECOMPRESSION POSTERIOR, posterior fossa decompression, C1, C2 laminectomy and expansive duraplasty.;  Surgeon: Jim Snatos MD;  Location: [facility] OR;  Service:    •   SECTION  ,   • COLONOSCOPY     • DIAGNOSTIC LAPAROSCOPY  2008    X2   • ENDOSCOPY  2018   • ENDOSCOPY N/A 2020    Procedure: ESOPHAGOGASTRODUODENOSCOPY WITH BIOPSY;  Surgeon: Marc Ramirez Jr., MD;  Location: [facility] ENDOSCOPY;  Service: General;  Laterality: N/A;  PRE-GERD, H/O GASTRIC POLYPS  POST- GASTRIC POLYPS, HIATAL HERNIA, GASTRITIS   • GASTRIC SLEEVE LAPAROSCOPIC N/A 2020    Procedure: GASTRIC SLEEVE LAPAROSCOPIC With PARAESOPHAGEAL Hernia Repair;  Surgeon: Marc Ramirez Jr., MD;  [place]  [facility] [place] [facility];  Service: Bariatric;  Laterality: N/A;   • KNEE SURGERY      X6, before age 19         Current Outpatient Medications:   •  hydrOXYzine (ATARAX) 25 MG tablet, 1/2 to 1 tablet daily at bedtime (Patient taking differently: As Needed.), Disp: 30 tablet, Rfl: 2  •  Levonorgest-Eth Est & Eth Est 42-21-21-7 DAYS tablet, Take 1 tablet by mouth Daily., Disp: 91 tablet, Rfl: 6  •  levothyroxine (SYNTHROID, LEVOTHROID) 75 MCG tablet, TAKE 1 TABLET DAILY AS DIRECTED., Disp: 90 tablet, Rfl: 1  •  Multiple Vitamins-Minerals (MULTIVITAMIN ADULT PO), Take 1 tablet by mouth Daily., Disp: , Rfl:   •  omeprazole (priLOSEC) 20 MG capsule, Take 20 mg by mouth Daily., Disp: , Rfl:   •  Semaglutide-Weight Management (Wegovy) 2.4 MG/0.75ML solution auto-injector, Inject 2.4 mg under the skin into the appropriate area as directed 1 (One) Time Per Week for 84 days., Disp: 9 mL, Rfl: 1  •  TiZANidine (Zanaflex) 6 MG capsule, Take 1 capsule by mouth 3 (Three) Times a Day As Needed for Muscle Spasms for up to 30 days., Disp: 20 capsule, Rfl: 3  •  TiZANidine (ZANAFLEX) 6 MG capsule, TAKE 1 CAPSULE TWICE DAILY AS NEEDED., Disp: 40 capsule, Rfl: 0     Allergies   Allergen Reactions   • Penicillins Hives and Rash   • Sulfa Antibiotics Hives and Rash       Social History     Tobacco Use   • Smoking status: Former Smoker     Years: 5.00     Types: Cigarettes     Quit date:      Years since  "quitting: 15.5   • Smokeless tobacco: Never Used   • Tobacco comment: QUIT 2007   Vaping Use   • Vaping Use: Never used   Substance Use Topics   • Alcohol use: Yes     Comment: social drinker   • Drug use: No        Objective     Vital Signs:   Temp 97.1 °F (36.2 °C) (Temporal)   Ht 170.2 cm (67\")   Wt 86 kg (189 lb 9.6 oz)   BMI 29.70 kg/m²       Physical Exam    General: Well developed, well nourished patient of stated age in no acute distress. Voice is strong and clear.   Head: Normocephalic and atraumatic.  Face: No lesions.  Bilateral parotid and submandibular glands are unremarkable.  Stensen's and Warthin's ducts are productive of clear saliva bilaterally.  House-Brackmann I/VI     bilaterally.   muscles and temporomandibular joint nontender to palpation.  No TMJ crepitus.  Eyes: PERRLA, sclerae anicteric, no conjunctival injection. Extraocular movements are intact and full. No nystagmus.   Ears: Auricles are normal in appearance. Bilateral external auditory canals are unremarkable aside from a small hair present in the medial portion of the left external auditory canal.  This was examined under the operating microscope and removed using alligator forceps. Bilateral tympanic membranes are clear and without effusion. Hearing normal to conversational voice.   Nose: External nose is normal in appearance. Bilateral nares are patent with normal appearing mucosa. Septum midline. Turbinates are unremarkable. No lesions.   Oral Cavity: Lips are normal in appearance. Oral mucosa is unremarkable. Gingiva is unremarkable. Normal dentition for age. Tongue is unremarkable with good movement. Hard palate is unremarkable.   Oropharynx: Soft palate is unremarkable with full movement. Uvula is unremarkable. Bilateral tonsils are unremarkable. Posterior oropharynx is unremarkable.    Larynx and hypopharynx: Deferred secondary to gag reflex.  Neck: Supple.  No mass.  Nontender to palpation.  Trachea midline. " Thyroid normal size and without nodules to palpation.   Lymphatic: No lymphadenopathy upon palpation.   Psychiatric: Appropriate affect, cooperative   Neurologic: Oriented x 3, strength symmetric in all extremities, Cranial Nerves II-XII are grossly intact to confrontation   Skin: Warm and dry. No rashes.    Procedures           Result Review :               Assessment and Plan    Diagnoses and all orders for this visit:    1. Foreign body of left ear, subsequent encounter (Primary)      Impressions and findings were discussed at great length.  Currently, she has evidence of a small hair present in the medial portion of the left external auditory canal which appears very close to the tympanic membrane.  This was successfully removed today in clinic.  She may follow-up with me on an as-needed basis.      Follow Up   No follow-ups on file.  Patient was given instructions and counseling regarding her condition or for health maintenance advice. Please see specific information pulled into the AVS if appropriate.

## 2022-07-25 ENCOUNTER — TREATMENT (OUTPATIENT)
Dept: PHYSICAL THERAPY | Facility: CLINIC | Age: 42
End: 2022-07-25

## 2022-07-25 DIAGNOSIS — R29.898 NECK TIGHTNESS: ICD-10-CM

## 2022-07-25 DIAGNOSIS — M54.2 CHRONIC NECK PAIN: Primary | ICD-10-CM

## 2022-07-25 DIAGNOSIS — G89.29 CHRONIC NECK PAIN: Primary | ICD-10-CM

## 2022-07-25 DIAGNOSIS — R29.3 POOR POSTURE: ICD-10-CM

## 2022-07-25 PROCEDURE — 97014 ELECTRIC STIMULATION THERAPY: CPT | Performed by: PHYSICAL THERAPIST

## 2022-07-25 PROCEDURE — 97140 MANUAL THERAPY 1/> REGIONS: CPT | Performed by: PHYSICAL THERAPIST

## 2022-07-25 NOTE — PROGRESS NOTES
Physical Therapy Daily Treatment Note    Patient: Valentina Anderson   : 1980  Diagnosis/ICD-10 Code:  Chronic neck pain [M54.2, G89.29]  Referring practitioner: Arnulfo Judd II, MD  Date of Initial Visit: Type: THERAPY  Noted: 2022  Today's Date: 2022  Patient seen for 2 sessions           Subjective   The patient reported that her neck was really sore after dry needling last visit. It took about 48 hours for the symptoms to start to improve. She would like to hold off on needling today, as she has a trip to [place] on Friday and doesn't want to risk being that sore again.    Objective   See Exercise, Manual, and Modality Logs for complete treatment.     Assessment/Plan  The patient demonstrated good tolerance to manual therapy and IFC with moist heat. Her pain was improved following treatment today. Consider resuming dry needling next visit.       Timed:  Manual Therapy:    15     mins  15739;  Therapeutic Exercise:    0     mins  76815;     Neuromuscular Geoff:   0    mins  93630;    Therapeutic Activity:     0     mins  82895;     Gait Trainin     mins  47492;     Aquatics                         0      mins  30169    Un-timed:  Mechanical Traction      0     mins  64432  Dry Needling     0     mins self-pay  Electrical Stimulation:    10     mins  22982 ( );      Timed Treatment:   15   mins   Total Treatment:     25   mins    Efrain Leiva PT  Physical Therapist    Electronically signed 2022    [place] License: PT - [id number]

## 2022-08-03 ENCOUNTER — TREATMENT (OUTPATIENT)
Dept: PHYSICAL THERAPY | Facility: CLINIC | Age: 42
End: 2022-08-03

## 2022-08-03 DIAGNOSIS — M77.11 LATERAL EPICONDYLITIS OF RIGHT ELBOW: ICD-10-CM

## 2022-08-03 DIAGNOSIS — M54.2 CHRONIC NECK PAIN: Primary | ICD-10-CM

## 2022-08-03 DIAGNOSIS — R29.898 NECK TIGHTNESS: ICD-10-CM

## 2022-08-03 DIAGNOSIS — G89.29 CHRONIC NECK PAIN: Primary | ICD-10-CM

## 2022-08-03 DIAGNOSIS — R29.3 POOR POSTURE: ICD-10-CM

## 2022-08-03 PROCEDURE — 97140 MANUAL THERAPY 1/> REGIONS: CPT | Performed by: PHYSICAL THERAPIST

## 2022-08-03 PROCEDURE — 97014 ELECTRIC STIMULATION THERAPY: CPT | Performed by: PHYSICAL THERAPIST

## 2022-08-03 NOTE — PROGRESS NOTES
Physical Therapy Daily Treatment Note    Patient: Valentina Anderson   : 1980  Diagnosis/ICD-10 Code:  Chronic neck pain [M54.2, G89.29]  Referring practitioner: Arnulfo Judd II, MD  Date of Initial Visit: Type: THERAPY  Noted: 2022  Today's Date: 8/3/2022  Patient seen for 3 sessions           Subjective   The patient reported that her pain is worse today that at her previous visit. She just got back from a trip to [place] at 1 am and the plane ride was hard on her neck/upper back. She is experiencing more radiating pain into her right arm today.     Objective   See Exercise, Manual, and Modality Logs for complete treatment.     Assessment/Plan  The patient demonstrated good tolerance to manual therapy and IFC with moist heat. She noted a reduction in pain after treatment. Continue to progress per patient tolerance.       Timed:  Manual Therapy:    15     mins  99988;  Therapeutic Exercise:    0     mins  65727;     Neuromuscular Geoff:   0    mins  03016;    Therapeutic Activity:     0     mins  25943;     Gait Trainin     mins  13219;     Aquatics                         0      mins  08077    Un-timed:  Mechanical Traction      0     mins  01751  Dry Needling     0     mins self-pay  Electrical Stimulation:    15     mins  90118 ( );      Timed Treatment:   15   mins   Total Treatment:     30   mins    Efrain Leiva PT  Physical Therapist    Electronically signed 8/3/2022    [place] License: PT - [id number]

## 2022-08-10 ENCOUNTER — HOSPITAL ENCOUNTER (OUTPATIENT)
Dept: MAMMOGRAPHY | Facility: HOSPITAL | Age: 42
Discharge: HOME | End: 2022-08-10
Admitting: OBSTETRICS & GYNECOLOGY

## 2022-08-10 DIAGNOSIS — Z12.31 ENCOUNTER FOR SCREENING MAMMOGRAM FOR BREAST CANCER: ICD-10-CM

## 2022-08-10 PROCEDURE — 77063 BREAST TOMOSYNTHESIS BI: CPT

## 2022-08-10 PROCEDURE — 77067 SCR MAMMO BI INCL CAD: CPT

## 2022-08-11 ENCOUNTER — TREATMENT (OUTPATIENT)
Dept: PHYSICAL THERAPY | Facility: CLINIC | Age: 42
End: 2022-08-11

## 2022-08-11 DIAGNOSIS — G89.29 CHRONIC NECK PAIN: Primary | ICD-10-CM

## 2022-08-11 DIAGNOSIS — R29.898 NECK TIGHTNESS: ICD-10-CM

## 2022-08-11 DIAGNOSIS — M54.2 CHRONIC NECK PAIN: Primary | ICD-10-CM

## 2022-08-11 DIAGNOSIS — R29.3 POOR POSTURE: ICD-10-CM

## 2022-08-11 PROCEDURE — 97140 MANUAL THERAPY 1/> REGIONS: CPT | Performed by: PHYSICAL THERAPIST

## 2022-08-11 PROCEDURE — 97014 ELECTRIC STIMULATION THERAPY: CPT | Performed by: PHYSICAL THERAPIST

## 2022-08-11 NOTE — PROGRESS NOTES
Physical Therapy Daily Treatment Note    Patient: Valentina Anderson   : 1980  Diagnosis/ICD-10 Code:  Chronic neck pain [M54.2, G89.29]  Referring practitioner: Arnulfo Judd II, MD  Date of Initial Visit: Type: THERAPY  Noted: 2022  Today's Date: 2022  Patient seen for 4 sessions           Subjective   The patient reported that her pain seems to be getting worse. It has become more lateral into her right arm and is still radiating around her right shoulder. She would like to hold off on PT for now. She is following up with a neurosurgeon next week.    Objective   See Exercise, Manual, and Modality Logs for complete treatment.     Assessment/Plan  The patient demonstrated good tolerance to manual therapy interventions today. Her pain was reduced some with manual therapy and IFC with cryotherapy. She will be placed on hold from PT at this time.       Timed:  Manual Therapy:    15     mins  52840;  Therapeutic Exercise:    0     mins  83158;     Neuromuscular Geoff:   0    mins  90687;    Therapeutic Activity:     0     mins  60286;     Gait Trainin     mins  62474;     Aquatics                         0      mins  08541    Un-timed:  Mechanical Traction      0     mins  66879  Dry Needling     0     mins self-pay  Electrical Stimulation:    10     mins  17758 ( );      Timed Treatment:   15   mins   Total Treatment:     25   mins    Efrain Leiva PT  Physical Therapist    Electronically signed 2022    [place] License: PT - [id number]

## 2022-08-19 ENCOUNTER — TELEMEDICINE (OUTPATIENT)
Dept: NEUROLOGY | Facility: CLINIC | Age: 42
End: 2022-08-19

## 2022-08-19 DIAGNOSIS — M54.81 OCCIPITAL NEURALGIA OF RIGHT SIDE: Primary | ICD-10-CM

## 2022-08-19 DIAGNOSIS — M54.6 CHRONIC RIGHT-SIDED THORACIC BACK PAIN: ICD-10-CM

## 2022-08-19 DIAGNOSIS — G89.29 CHRONIC NECK PAIN: ICD-10-CM

## 2022-08-19 DIAGNOSIS — R20.2 PARESTHESIAS: ICD-10-CM

## 2022-08-19 DIAGNOSIS — G89.29 CHRONIC RIGHT-SIDED THORACIC BACK PAIN: ICD-10-CM

## 2022-08-19 DIAGNOSIS — M54.2 CHRONIC NECK PAIN: ICD-10-CM

## 2022-08-19 PROCEDURE — 99213 OFFICE O/P EST LOW 20 MIN: CPT | Performed by: PSYCHIATRY & NEUROLOGY

## 2022-08-19 RX ORDER — GABAPENTIN 100 MG/1
100 CAPSULE ORAL 3 TIMES DAILY
Qty: 90 CAPSULE | Refills: 2 | Status: SHIPPED | OUTPATIENT
Start: 2022-08-19 | End: 2022-11-01 | Stop reason: SDUPTHER

## 2022-08-19 NOTE — PROGRESS NOTES
Chief complaint: History of chronic neck pain, occipital neuralgia    Patient ID: Valentina Anderson is a 42 y.o. female.    HPI:This was an audio and video enabled telemedicine encounter.  The patient did consent to this type of encounter..  The patient is at home and I am here in the neurology clinic.  I had the pleasure of seeing your patient today.  As you may know she is a 42-year-old female here for the management of occipital neuralgia, chronic neck pain and headaches.  She has had some progression of her symptoms.  She has more neck pain that radiates into her right arm laterally.  She is also experiencing significant back pain at the thoracic region.  She points to her bra strap region as the originator of this type of pain.  She has not noted any significant weakness in that arm.  No loss of sensation.  No bowel or bladder changes.  She is having a significant decrease in her quality of life with respect to the pain symptoms.    The following portions of the patient's history were reviewed and updated as appropriate: allergies, current medications, past family history, past medical history, past social history, past surgical history and problem list.    Review of Systems   I have reviewed the review of systems above performed by my medical assistant.      There were no vitals filed for this visit.    Neurologic Exam    Physical Exam    Procedures    Assessment/Plan: I am going to schedule her for a follow-up MRI of the cervical spine as well as the thoracic spine.  We are also going to start gabapentin 100 mg titration to 3 times daily dosing.  We will see her back  in 3 months or sooner if needed.  A total of 30 minutes was spent during this visit discussing signs and symptoms of chronic neck pain, back pain, patient education, plan of care and prognosis.       Diagnoses and all orders for this visit:    1. Occipital neuralgia of right side (Primary)  -     gabapentin (NEURONTIN) 100 MG capsule; Take 1  capsule by mouth 3 (Three) Times a Day for 30 days.  Dispense: 90 capsule; Refill: 2    2. Chronic neck pain  -     gabapentin (NEURONTIN) 100 MG capsule; Take 1 capsule by mouth 3 (Three) Times a Day for 30 days.  Dispense: 90 capsule; Refill: 2  -     MRI Cervical Spine With & Without Contrast; Future    3. Paresthesias  -     MRI Cervical Spine With & Without Contrast; Future    4. Chronic right-sided thoracic back pain  -     gabapentin (NEURONTIN) 100 MG capsule; Take 1 capsule by mouth 3 (Three) Times a Day for 30 days.  Dispense: 90 capsule; Refill: 2  -     MRI Thoracic Spine With & Without Contrast; Future           Arnulfo Judd II, MD

## 2022-08-30 DIAGNOSIS — G43.109 MIGRAINE WITH AURA AND WITHOUT STATUS MIGRAINOSUS, NOT INTRACTABLE: Primary | ICD-10-CM

## 2022-08-30 NOTE — TELEPHONE ENCOUNTER
Pt is getting MRI completed 09/13/2022 and needs vallium in order to complete. Please advise and approve. Thank you.

## 2022-08-31 RX ORDER — DIAZEPAM 10 MG/1
TABLET ORAL
Qty: 1 TABLET | Refills: 0 | Status: SHIPPED | OUTPATIENT
Start: 2022-08-31 | End: 2023-01-16

## 2022-09-13 ENCOUNTER — HOSPITAL ENCOUNTER (OUTPATIENT)
Dept: MRI IMAGING | Facility: HOSPITAL | Age: 42
Discharge: HOME | End: 2022-09-13

## 2022-09-13 DIAGNOSIS — M54.6 CHRONIC RIGHT-SIDED THORACIC BACK PAIN: ICD-10-CM

## 2022-09-13 DIAGNOSIS — G89.29 CHRONIC RIGHT-SIDED THORACIC BACK PAIN: ICD-10-CM

## 2022-09-13 DIAGNOSIS — R20.2 PARESTHESIAS: ICD-10-CM

## 2022-09-13 DIAGNOSIS — M54.2 CHRONIC NECK PAIN: ICD-10-CM

## 2022-09-13 DIAGNOSIS — G89.29 CHRONIC NECK PAIN: ICD-10-CM

## 2022-09-13 PROCEDURE — 72156 MRI NECK SPINE W/O & W/DYE: CPT

## 2022-09-13 PROCEDURE — 0 GADOBENATE DIMEGLUMINE 529 MG/ML SOLUTION: Performed by: PSYCHIATRY & NEUROLOGY

## 2022-09-13 PROCEDURE — 72157 MRI CHEST SPINE W/O & W/DYE: CPT

## 2022-09-13 PROCEDURE — A9577 INJ MULTIHANCE: HCPCS | Performed by: PSYCHIATRY & NEUROLOGY

## 2022-09-13 RX ADMIN — GADOBENATE DIMEGLUMINE 15 ML: 529 INJECTION, SOLUTION INTRAVENOUS at 08:03

## 2022-09-20 ENCOUNTER — TELEPHONE (OUTPATIENT)
Dept: NEUROLOGY | Facility: CLINIC | Age: 42
End: 2022-09-20

## 2022-09-20 NOTE — TELEPHONE ENCOUNTER
Caller: Valentina Anderson    Relationship: Self    Best call back number: [phone]    Who are you requesting to speak with (clinical staff, provider,  specific staff member):  DR ISLAS    What was the call regarding:   MRIs   DONE ON 9-13-22  AT [facility]    PT IS NOT SCHEDULED FOR F/U UNTIL 5-30-22. PT HAS SEEN RESULTS ON MY CHART AND IS WANTING TO BE SEEN SOONER AND WOULD BE AVAILABLE FOR A MY CHART VIDEO VISIT OR A PHONE CALL TO DISCUSS RESULTS AND POSSIBLE INCREASE OF   gabapentin (NEURONTIN) 100 MG capsule    Do you require a callback:   YES, PLEASE.

## 2022-11-01 ENCOUNTER — PATIENT MESSAGE (OUTPATIENT)
Dept: NEUROLOGY | Facility: CLINIC | Age: 42
End: 2022-11-01

## 2022-11-01 DIAGNOSIS — M54.6 CHRONIC RIGHT-SIDED THORACIC BACK PAIN: ICD-10-CM

## 2022-11-01 DIAGNOSIS — G89.29 CHRONIC RIGHT-SIDED THORACIC BACK PAIN: ICD-10-CM

## 2022-11-01 DIAGNOSIS — M54.2 CHRONIC NECK PAIN: ICD-10-CM

## 2022-11-01 DIAGNOSIS — M54.81 OCCIPITAL NEURALGIA OF RIGHT SIDE: ICD-10-CM

## 2022-11-01 DIAGNOSIS — G89.29 CHRONIC NECK PAIN: ICD-10-CM

## 2022-11-01 RX ORDER — GABAPENTIN 100 MG/1
100 CAPSULE ORAL 3 TIMES DAILY
Qty: 90 CAPSULE | Refills: 2 | Status: SHIPPED | OUTPATIENT
Start: 2022-11-01 | End: 2022-11-02 | Stop reason: SDUPTHER

## 2022-11-02 DIAGNOSIS — G89.29 CHRONIC RIGHT-SIDED THORACIC BACK PAIN: ICD-10-CM

## 2022-11-02 DIAGNOSIS — M54.81 OCCIPITAL NEURALGIA OF RIGHT SIDE: ICD-10-CM

## 2022-11-02 DIAGNOSIS — M54.6 CHRONIC RIGHT-SIDED THORACIC BACK PAIN: ICD-10-CM

## 2022-11-02 DIAGNOSIS — G89.29 CHRONIC NECK PAIN: ICD-10-CM

## 2022-11-02 DIAGNOSIS — M54.2 CHRONIC NECK PAIN: ICD-10-CM

## 2022-11-02 PROCEDURE — U0004 COV-19 TEST NON-CDC HGH THRU: HCPCS | Performed by: EMERGENCY MEDICINE

## 2022-11-02 RX ORDER — GABAPENTIN 100 MG/1
200 CAPSULE ORAL 3 TIMES DAILY
Qty: 180 CAPSULE | Refills: 2 | Status: SHIPPED | OUTPATIENT
Start: 2022-11-02 | End: 2023-02-28 | Stop reason: SDUPTHER

## 2023-01-06 RX ORDER — SEMAGLUTIDE 2.4 MG/.75ML
2.4 INJECTION, SOLUTION SUBCUTANEOUS WEEKLY
Qty: 9 ML | Refills: 1 | Status: SHIPPED | OUTPATIENT
Start: 2023-01-06 | End: 2023-01-16 | Stop reason: SDUPTHER

## 2023-01-16 ENCOUNTER — OFFICE VISIT (OUTPATIENT)
Dept: NEUROLOGY | Facility: CLINIC | Age: 43
End: 2023-01-16
Payer: COMMERCIAL

## 2023-01-16 ENCOUNTER — OFFICE VISIT (OUTPATIENT)
Dept: BARIATRICS/WEIGHT MGMT | Facility: CLINIC | Age: 43
End: 2023-01-16
Payer: COMMERCIAL

## 2023-01-16 VITALS
BODY MASS INDEX: 24.8 KG/M2 | DIASTOLIC BLOOD PRESSURE: 82 MMHG | WEIGHT: 158 LBS | SYSTOLIC BLOOD PRESSURE: 110 MMHG | HEIGHT: 67 IN

## 2023-01-16 VITALS
WEIGHT: 159 LBS | BODY MASS INDEX: 24.96 KG/M2 | DIASTOLIC BLOOD PRESSURE: 78 MMHG | HEIGHT: 67 IN | HEART RATE: 81 BPM | SYSTOLIC BLOOD PRESSURE: 119 MMHG | TEMPERATURE: 97.2 F

## 2023-01-16 DIAGNOSIS — M25.50 MULTIPLE JOINT PAIN: ICD-10-CM

## 2023-01-16 DIAGNOSIS — Z71.3 DIETARY COUNSELING: ICD-10-CM

## 2023-01-16 DIAGNOSIS — R42 DIZZINESS: ICD-10-CM

## 2023-01-16 DIAGNOSIS — B37.2 INTERTRIGINOUS CANDIDIASIS: ICD-10-CM

## 2023-01-16 DIAGNOSIS — R53.82 CHRONIC FATIGUE: ICD-10-CM

## 2023-01-16 DIAGNOSIS — M54.81 BILATERAL OCCIPITAL NEURALGIA: ICD-10-CM

## 2023-01-16 DIAGNOSIS — E55.9 VITAMIN D DEFICIENCY: ICD-10-CM

## 2023-01-16 DIAGNOSIS — Z98.84 S/P LAPAROSCOPIC SLEEVE GASTRECTOMY: ICD-10-CM

## 2023-01-16 DIAGNOSIS — E03.9 HYPOTHYROIDISM, UNSPECIFIED TYPE: ICD-10-CM

## 2023-01-16 DIAGNOSIS — M54.81 OCCIPITAL NEURALGIA OF RIGHT SIDE: Primary | ICD-10-CM

## 2023-01-16 DIAGNOSIS — G93.5 CHIARI MALFORMATION TYPE I: ICD-10-CM

## 2023-01-16 PROBLEM — E66.3 OVERWEIGHT (BMI 25.0-29.9): Status: RESOLVED | Noted: 2022-07-15 | Resolved: 2023-01-16

## 2023-01-16 PROCEDURE — 99213 OFFICE O/P EST LOW 20 MIN: CPT | Performed by: PSYCHIATRY & NEUROLOGY

## 2023-01-16 PROCEDURE — 99213 OFFICE O/P EST LOW 20 MIN: CPT | Performed by: NURSE PRACTITIONER

## 2023-01-16 RX ORDER — SEMAGLUTIDE 2.4 MG/.75ML
2.4 INJECTION, SOLUTION SUBCUTANEOUS WEEKLY
Qty: 9 ML | Refills: 1 | Status: SHIPPED | OUTPATIENT
Start: 2023-01-16 | End: 2023-05-02

## 2023-01-16 NOTE — PROGRESS NOTES
Chief Complaint   Patient presents with   • occipital neuralgia       Patient ID: Valentina Anderson is a 42 y.o. female.    HPI: I had the pleasure of seeing your patient again today.  As you know she is a 42-year-old female here for the management of occipital neuralgia, right.  She has been doing well since the increase of the gabapentin.  She is currently taking 200 mg 3 times daily.  She occasionally will feel some of the pain symptoms in the neck region however it typically does not cause significant head pain for her.  She denies any new symptoms neurologically.    The following portions of the patient's history were reviewed and updated as appropriate: allergies, current medications, past family history, past medical history, past social history, past surgical history and problem list.    Review of Systems   Neurological: Positive for dizziness. Negative for tremors, seizures, syncope, speech difficulty, weakness, light-headedness, numbness and headaches.   Hematological: Bruises/bleeds easily.   Psychiatric/Behavioral: Positive for sleep disturbance. Negative for agitation, behavioral problems, confusion, decreased concentration, hallucinations, self-injury and suicidal ideas. The patient is nervous/anxious.       I have reviewed the review of systems above performed by my medical assistant.      Vitals:    01/16/23 1113   BP: 110/82       Neurologic Exam     Mental Status   Oriented to person, place, and time.   Concentration: normal.   Level of consciousness: alert  Knowledge: consistent with education (No deficits found.).     Cranial Nerves     CN II   Visual fields full to confrontation.     CN III, IV, VI   Pupils are equal, round, and reactive to light.  Extraocular motions are normal.   CN III: no CN III palsy  CN VI: no CN VI palsy    CN V   Facial sensation intact.     CN VII   Facial expression full, symmetric.     CN VIII   CN VIII normal.     CN IX, X   CN IX normal.   CN X normal.     CN XI   CN  XI normal.     CN XII   CN XII normal.     Motor Exam     Strength   Right neck flexion: 5/5  Left neck flexion: 5/5  Right neck extension: 5/5  Left neck extension: 5/5  Right deltoid: 5/5  Left deltoid: 5/5  Right biceps: 5/5  Left biceps: 5/5  Right triceps: 5/5  Left triceps: 5/5  Right wrist flexion: 5/5  Left wrist flexion: 5/5  Right wrist extension: 5/5  Left wrist extension: 5/5  Right interossei: 5/5  Left interossei: 5/5  Right abdominals: 5/5  Left abdominals: 5/5  Right iliopsoas: 5/5  Left iliopsoas: 5/5  Right quadriceps: 5/5  Left quadriceps: 5/5  Right hamstrin/5  Left hamstrin/5  Right glutei: 5/5  Left glutei: 5/5  Right anterior tibial: 5/5  Left anterior tibial: 5/5  Right posterior tibial: 5/5  Left posterior tibial: 5/5  Right peroneal: 5/5  Left peroneal: 5/5  Right gastroc: 5/5  Left gastroc: 5/5    Sensory Exam   Light touch normal.   Vibration normal.     Gait, Coordination, and Reflexes     Gait  Gait: normal    Reflexes   Right brachioradialis: 2+  Left brachioradialis: 2+  Right biceps: 2+  Left biceps: 2+  Right triceps: 2+  Left triceps: 2+  Right patellar: 2+  Left patellar: 2+  Right achilles: 2+  Left achilles: 2+  Right : 2+  Left : 2+Station is normal.       Physical Exam  Vitals reviewed.   Constitutional:       Appearance: She is well-developed.   HENT:      Head: Normocephalic and atraumatic.   Eyes:      Extraocular Movements: EOM normal.      Pupils: Pupils are equal, round, and reactive to light.   Cardiovascular:      Rate and Rhythm: Normal rate and regular rhythm.   Pulmonary:      Breath sounds: Normal breath sounds.   Musculoskeletal:         General: Normal range of motion.   Skin:     General: Skin is warm.   Neurological:      Mental Status: She is oriented to person, place, and time.      Gait: Gait is intact.      Deep Tendon Reflexes:      Reflex Scores:       Tricep reflexes are 2+ on the right side and 2+ on the left side.       Bicep reflexes  are 2+ on the right side and 2+ on the left side.       Brachioradialis reflexes are 2+ on the right side and 2+ on the left side.       Patellar reflexes are 2+ on the right side and 2+ on the left side.       Achilles reflexes are 2+ on the right side and 2+ on the left side.        Procedures    Assessment/Plan: We are going to continue her current dosing of gabapentin.  I did mention she can try to wean off of some of the medication in about 3 months.  That weaning process was discussed.  We will see her back in 4 months or sooner if needed.  A total of 20 minutes was spent face-to-face with patient today.  Of that greater than 50% of this time spent discussing signs and symptoms of occipital neuralgia, patient education, plan of care and prognosis.       Diagnoses and all orders for this visit:    1. Occipital neuralgia of right side (Primary)           Arnulfo Judd II, MD

## 2023-01-16 NOTE — PROGRESS NOTES
[facility]  [address] [facility] [address]  [facility] [address]  [phone]  [address] [facility] [address]  [facility] [id number] [phone]  Dept: [phone]  1/16/2023      Valentina Anderson.  [id number]  [id number]  1980  female      Chief Complaint   Patient presents with   • Follow-up     p wegovy        Post-Op Bariatric Surgery:   Valentina Anderson is status post Laparoscopic Sleeve with PEHR procedure, performed on 11/4/2020     HPI:   Today's weight is 72.1 kg (159 lb) pounds, today's BMI is Body mass index is 24.58 kg/m².,has a  loss of 31 pounds since the last visit and weight loss since surgery is 152 pounds. The patient reports a decreased portion size and loss of appetite.      Valentina Anderson denies nausea, vomiting, reflux, dysphagia and reports tolerating a regular diet.  Was started on Wegovy on 3/16/22 and has tolerated well.  Sometimes has some reflux and she takes omeprazole otc for this.    She has noticed a rash under her excess skin/pannus on her abdomen and in her umbilicus.  She has tried deodarant-Lume, athletes foot spray and some powder to try and get rid of it.  States itches and has an odor.       Diet and Exercise: Diet history reviewed and discussed with the patient. Weight loss/gains to date discussed with the patient. The patient states they are eating  grams of protein per day. She reports eating 3 meals per day, a typical portion size of 1/2 cup, eating 1 snacks per day, drinking 5 or more 8-oz. glasses of water per day, no carbonated beverage consumption and exercising regularly.     Supplements: none.     Review of Systems   Constitutional: Positive for appetite change.   Skin: Positive for rash.   All other systems reviewed and are negative.      Patient Active Problem List   Diagnosis   • Chiari malformation type I (HCC)   • Dizziness   • New daily persistent headache   • History of brain surgery   •  Degeneration of intervertebral disc at C6-C7 level   • Bilateral occipital neuralgia   • Chronic fatigue   • Multiple joint pain   • Anxiety and depression   • Hypothyroid   • PCOS (polycystic ovarian syndrome)   • Endometriosis   • Multiple gastric polyps   • S/P laparoscopic sleeve gastrectomy   • Dietary counseling   • Abnormal electrocardiogram   • Vitamin D deficiency   • Intertriginous candidiasis   • Body mass index (BMI) 24.0-24.9, adult       Past Medical History:   Diagnosis Date   • Chiari I malformation (HCC)    • DDD (degenerative disc disease), cervical    • DDD (degenerative disc disease), cervical    • Disease of thyroid gland    • Dizziness    • Endometriosis    • GERD (gastroesophageal reflux disease)    • Headache    • Heart palpitations    • Hiatal hernia    • PONV (postoperative nausea and vomiting)        The following portions of the patient's history were reviewed and updated as appropriate: allergies, current medications, past medical history, past surgical history and problem list.    Vitals:    01/16/23 1501   BP: 119/78   Pulse: 81   Temp: 97.2 °F (36.2 °C)       Physical Exam  Vitals reviewed.   Constitutional:       General: She is not in acute distress.     Appearance: Normal appearance. She is well-developed, well-groomed and normal weight.   HENT:      Head: Normocephalic and atraumatic.      Mouth/Throat:      Mouth: Mucous membranes are moist.      Pharynx: Oropharynx is clear.   Eyes:      General: No scleral icterus.     Extraocular Movements: Extraocular movements intact.      Conjunctiva/sclera: Conjunctivae normal.      Pupils: Pupils are equal, round, and reactive to light.   Cardiovascular:      Rate and Rhythm: Normal rate and regular rhythm.      Heart sounds: Normal heart sounds. No murmur heard.    No gallop.   Pulmonary:      Effort: Pulmonary effort is normal. No respiratory distress.   Abdominal:      General: Bowel sounds are normal.      Palpations: Abdomen is soft.    Musculoskeletal:         General: Normal range of motion.      Cervical back: Normal range of motion and neck supple.   Skin:     General: Skin is warm and dry.   Neurological:      General: No focal deficit present.      Mental Status: She is alert and oriented to person, place, and time.   Psychiatric:         Mood and Affect: Mood normal.         Behavior: Behavior normal.         Thought Content: Thought content normal.         Judgment: Judgment normal.         Assessment:   Post-op, the patient is doing well.     Encounter Diagnoses   Name Primary?   • Body mass index (BMI) 24.0-24.9, adult Yes   • S/P laparoscopic sleeve gastrectomy    • Hypothyroidism, unspecified type    • Vitamin D deficiency    • Bilateral occipital neuralgia    • Multiple joint pain    • Chiari malformation type I (HCC)    • Dizziness    • Chronic fatigue    • Dietary counseling    • Intertriginous candidiasis        Plan:   Will put in labs for her to have drawn in March.    Will refill Wegovy.    Patient has lost 55#s or 36% weight loss.    Nystatin powder.     Encouraged patient to be sure to get plenty of lean protein per day through small frequent meals all with a protein source.   Activity restrictions: none.   Recommended patient be sure to get at least 70 grams of protein per day by eating small, frequent meals all with high lean protein choices. Be sure to limit/cut back on daily carbohydrate intake. Discussed with the patient the recommended amount of water per day to intake- half of body weight in ounces. Reviewed vitamin requirements. Be sure to do routine exercise, 150 minutes per week minimum, including both cardio and strength training.     Instructions / Recommendations: dietary counseling recommended, recommended a daily protein intake of  grams, vitamin supplement(s) recommended, recommended exercising at least 150 minutes per week, behavior modifications recommended and instructed to call the office for  concerns, questions, or problems.     The patient was instructed to follow up in 6 months .     Total time spent during this encounter today was 25 minutes.

## 2023-01-17 RX ORDER — NYSTATIN 100000 [USP'U]/G
1 POWDER TOPICAL 3 TIMES DAILY
Qty: 60 G | Refills: 3 | Status: SHIPPED | OUTPATIENT
Start: 2023-01-17

## 2023-02-16 ENCOUNTER — TRANSCRIBE ORDERS (OUTPATIENT)
Dept: ADMINISTRATIVE | Facility: HOSPITAL | Age: 43
End: 2023-02-16
Payer: COMMERCIAL

## 2023-02-16 DIAGNOSIS — M54.89 INFLAMMATORY BACK PAIN: Primary | ICD-10-CM

## 2023-02-17 DIAGNOSIS — Z98.84 S/P LAPAROSCOPIC SLEEVE GASTRECTOMY: ICD-10-CM

## 2023-02-28 DIAGNOSIS — G89.29 CHRONIC RIGHT-SIDED THORACIC BACK PAIN: ICD-10-CM

## 2023-02-28 DIAGNOSIS — M54.6 CHRONIC RIGHT-SIDED THORACIC BACK PAIN: ICD-10-CM

## 2023-02-28 DIAGNOSIS — G89.29 CHRONIC NECK PAIN: ICD-10-CM

## 2023-02-28 DIAGNOSIS — M54.81 OCCIPITAL NEURALGIA OF RIGHT SIDE: ICD-10-CM

## 2023-02-28 DIAGNOSIS — M54.2 CHRONIC NECK PAIN: ICD-10-CM

## 2023-02-28 RX ORDER — GABAPENTIN 100 MG/1
200 CAPSULE ORAL 3 TIMES DAILY
Qty: 180 CAPSULE | Refills: 2 | Status: SHIPPED | OUTPATIENT
Start: 2023-02-28 | End: 2023-03-30

## 2023-02-28 RX ORDER — GABAPENTIN 100 MG/1
200 CAPSULE ORAL 3 TIMES DAILY
Qty: 180 CAPSULE | Refills: 2 | Status: CANCELLED | OUTPATIENT
Start: 2023-02-28 | End: 2023-03-30

## 2023-03-03 ENCOUNTER — LAB (OUTPATIENT)
Dept: LAB | Facility: HOSPITAL | Age: 43
End: 2023-03-03
Payer: COMMERCIAL

## 2023-03-03 DIAGNOSIS — M54.81 BILATERAL OCCIPITAL NEURALGIA: ICD-10-CM

## 2023-03-03 DIAGNOSIS — R53.82 CHRONIC FATIGUE: ICD-10-CM

## 2023-03-03 DIAGNOSIS — G93.5 CHIARI MALFORMATION TYPE I: ICD-10-CM

## 2023-03-03 DIAGNOSIS — E03.9 HYPOTHYROIDISM, UNSPECIFIED TYPE: ICD-10-CM

## 2023-03-03 DIAGNOSIS — Z71.3 DIETARY COUNSELING: ICD-10-CM

## 2023-03-03 DIAGNOSIS — R42 DIZZINESS: ICD-10-CM

## 2023-03-03 DIAGNOSIS — M25.50 MULTIPLE JOINT PAIN: ICD-10-CM

## 2023-03-03 DIAGNOSIS — Z98.84 S/P LAPAROSCOPIC SLEEVE GASTRECTOMY: ICD-10-CM

## 2023-03-03 DIAGNOSIS — E55.9 VITAMIN D DEFICIENCY: ICD-10-CM

## 2023-03-03 LAB
25(OH)D3 SERPL-MCNC: 52.4 NG/ML (ref 30–100)
ALBUMIN SERPL-MCNC: 4.1 G/DL (ref 3.5–5.2)
ALBUMIN/GLOB SERPL: 1.6 G/DL
ALP SERPL-CCNC: 38 U/L (ref 39–117)
ALT SERPL W P-5'-P-CCNC: 14 U/L (ref 1–33)
ANION GAP SERPL CALCULATED.3IONS-SCNC: 6.1 MMOL/L (ref 5–15)
AST SERPL-CCNC: 23 U/L (ref 1–32)
BASOPHILS # BLD AUTO: 0.03 10*3/MM3 (ref 0–0.2)
BASOPHILS NFR BLD AUTO: 0.9 % (ref 0–1.5)
BILIRUB SERPL-MCNC: 0.5 MG/DL (ref 0–1.2)
BUN SERPL-MCNC: 10 MG/DL (ref 6–20)
BUN/CREAT SERPL: 13 (ref 7–25)
CALCIUM SPEC-SCNC: 9 MG/DL (ref 8.6–10.5)
CHLORIDE SERPL-SCNC: 107 MMOL/L (ref 98–107)
CO2 SERPL-SCNC: 24.9 MMOL/L (ref 22–29)
CREAT SERPL-MCNC: 0.77 MG/DL (ref 0.57–1)
DEPRECATED RDW RBC AUTO: 43.6 FL (ref 37–54)
EGFRCR SERPLBLD CKD-EPI 2021: 97.1 ML/MIN/1.73
EOSINOPHIL # BLD AUTO: 0.1 10*3/MM3 (ref 0–0.4)
EOSINOPHIL NFR BLD AUTO: 3.1 % (ref 0.3–6.2)
ERYTHROCYTE [DISTWIDTH] IN BLOOD BY AUTOMATED COUNT: 13.5 % (ref 12.3–15.4)
FERRITIN SERPL-MCNC: 97.8 NG/ML (ref 13–150)
FOLATE SERPL-MCNC: 7.25 NG/ML (ref 4.78–24.2)
GLOBULIN UR ELPH-MCNC: 2.5 GM/DL
GLUCOSE SERPL-MCNC: 77 MG/DL (ref 65–99)
HCT VFR BLD AUTO: 39.6 % (ref 34–46.6)
HGB BLD-MCNC: 13.5 G/DL (ref 12–15.9)
IMM GRANULOCYTES # BLD AUTO: 0 10*3/MM3 (ref 0–0.05)
IMM GRANULOCYTES NFR BLD AUTO: 0 % (ref 0–0.5)
IRON 24H UR-MRATE: 122 MCG/DL (ref 37–145)
LYMPHOCYTES # BLD AUTO: 1.46 10*3/MM3 (ref 0.7–3.1)
LYMPHOCYTES NFR BLD AUTO: 44.8 % (ref 19.6–45.3)
MAGNESIUM SERPL-MCNC: 2.3 MG/DL (ref 1.6–2.6)
MCH RBC QN AUTO: 30.3 PG (ref 26.6–33)
MCHC RBC AUTO-ENTMCNC: 34.1 G/DL (ref 31.5–35.7)
MCV RBC AUTO: 89 FL (ref 79–97)
MONOCYTES # BLD AUTO: 0.3 10*3/MM3 (ref 0.1–0.9)
MONOCYTES NFR BLD AUTO: 9.2 % (ref 5–12)
NEUTROPHILS NFR BLD AUTO: 1.37 10*3/MM3 (ref 1.7–7)
NEUTROPHILS NFR BLD AUTO: 42 % (ref 42.7–76)
NRBC BLD AUTO-RTO: 0 /100 WBC (ref 0–0.2)
PHOSPHATE SERPL-MCNC: 3.4 MG/DL (ref 2.5–4.5)
PLATELET # BLD AUTO: 229 10*3/MM3 (ref 140–450)
PMV BLD AUTO: 10.2 FL (ref 6–12)
POTASSIUM SERPL-SCNC: 4 MMOL/L (ref 3.5–5.2)
PREALB SERPL-MCNC: 23.4 MG/DL (ref 20–40)
PROT SERPL-MCNC: 6.6 G/DL (ref 6–8.5)
PTH-INTACT SERPL-MCNC: 38.1 PG/ML (ref 15–65)
RBC # BLD AUTO: 4.45 10*6/MM3 (ref 3.77–5.28)
SODIUM SERPL-SCNC: 138 MMOL/L (ref 136–145)
WBC NRBC COR # BLD: 3.26 10*3/MM3 (ref 3.4–10.8)

## 2023-03-03 PROCEDURE — 82306 VITAMIN D 25 HYDROXY: CPT

## 2023-03-03 PROCEDURE — 83540 ASSAY OF IRON: CPT

## 2023-03-03 PROCEDURE — 84597 ASSAY OF VITAMIN K: CPT

## 2023-03-03 PROCEDURE — 83970 ASSAY OF PARATHORMONE: CPT

## 2023-03-03 PROCEDURE — 84134 ASSAY OF PREALBUMIN: CPT

## 2023-03-03 PROCEDURE — 83921 ORGANIC ACID SINGLE QUANT: CPT

## 2023-03-03 PROCEDURE — 82728 ASSAY OF FERRITIN: CPT

## 2023-03-03 PROCEDURE — 82746 ASSAY OF FOLIC ACID SERUM: CPT

## 2023-03-03 PROCEDURE — 36415 COLL VENOUS BLD VENIPUNCTURE: CPT

## 2023-03-03 PROCEDURE — 83735 ASSAY OF MAGNESIUM: CPT

## 2023-03-03 PROCEDURE — 85025 COMPLETE CBC W/AUTO DIFF WBC: CPT

## 2023-03-03 PROCEDURE — 84425 ASSAY OF VITAMIN B-1: CPT

## 2023-03-03 PROCEDURE — 80053 COMPREHEN METABOLIC PANEL: CPT

## 2023-03-03 PROCEDURE — 84100 ASSAY OF PHOSPHORUS: CPT

## 2023-03-03 PROCEDURE — 84446 ASSAY OF VITAMIN E: CPT

## 2023-03-03 PROCEDURE — 84630 ASSAY OF ZINC: CPT

## 2023-03-03 PROCEDURE — 84590 ASSAY OF VITAMIN A: CPT

## 2023-03-05 LAB
VIT B1 BLD-SCNC: 95.9 NMOL/L (ref 66.5–200)
ZINC SERPL-MCNC: 60 UG/DL (ref 44–115)

## 2023-03-09 LAB — METHYLMALONATE SERPL-SCNC: 135 NMOL/L (ref 0–378)

## 2023-03-10 LAB — PHYTONADIONE SERPL-MCNC: 0.25 NG/ML (ref 0.1–2.2)

## 2023-03-13 ENCOUNTER — HOSPITAL ENCOUNTER (OUTPATIENT)
Dept: MRI IMAGING | Facility: HOSPITAL | Age: 43
Discharge: HOME | End: 2023-03-13
Admitting: NURSE PRACTITIONER
Payer: COMMERCIAL

## 2023-03-13 DIAGNOSIS — M54.89 INFLAMMATORY BACK PAIN: ICD-10-CM

## 2023-03-13 LAB
A-TOCOPHEROL VIT E SERPL-MCNC: 7.5 MG/L (ref 7–25.1)
GAMMA TOCOPHEROL SERPL-MCNC: 0.8 MG/L (ref 0.5–5.5)
VIT A SERPL-MCNC: 46.7 UG/DL (ref 20.1–62)

## 2023-03-13 PROCEDURE — 72195 MRI PELVIS W/O DYE: CPT

## 2023-04-24 RX ORDER — SEMAGLUTIDE 2.4 MG/.75ML
2.4 INJECTION, SOLUTION SUBCUTANEOUS WEEKLY
Qty: 9 ML | Refills: 1 | Status: SHIPPED | OUTPATIENT
Start: 2023-04-24 | End: 2023-07-24

## 2023-05-09 ENCOUNTER — TELEPHONE (OUTPATIENT)
Dept: NEUROLOGY | Facility: CLINIC | Age: 43
End: 2023-05-09
Payer: COMMERCIAL

## 2023-05-30 ENCOUNTER — OFFICE VISIT (OUTPATIENT)
Dept: NEUROLOGY | Facility: CLINIC | Age: 43
End: 2023-05-30

## 2023-05-30 VITALS
HEIGHT: 67 IN | WEIGHT: 150.2 LBS | SYSTOLIC BLOOD PRESSURE: 116 MMHG | BODY MASS INDEX: 23.57 KG/M2 | OXYGEN SATURATION: 100 % | HEART RATE: 88 BPM | DIASTOLIC BLOOD PRESSURE: 78 MMHG

## 2023-05-30 DIAGNOSIS — M54.2 CHRONIC NECK PAIN: ICD-10-CM

## 2023-05-30 DIAGNOSIS — M54.81 OCCIPITAL NEURALGIA OF RIGHT SIDE: Primary | ICD-10-CM

## 2023-05-30 DIAGNOSIS — G89.29 CHRONIC NECK PAIN: ICD-10-CM

## 2023-05-30 PROCEDURE — 99212 OFFICE O/P EST SF 10 MIN: CPT | Performed by: PSYCHIATRY & NEUROLOGY

## 2023-05-30 NOTE — PROGRESS NOTES
Chief Complaint   Patient presents with   • Occipital Neuralgia     Rt Eye       Patient ID: Valentina Anderson is a 43 y.o. female.    HPI: I had the pleasure of seeing your patient again today.  As you may know she is a 43-year-old female here for the management of occipital neuralgia.  She has been doing very well.  She is currently taking gabapentin 100 mg, 2 tablets in the morning and 2 tablets at night.  This is a wean down from 3 times daily.  She denies any new symptoms neurologically.  She says for the most part it has been helpful.  She rarely experiences any symptoms of right occipital nerve pain.  She does still have occasional neck pain however she is able to do the therapy herself and is able to abort that.      The following portions of the patient's history were reviewed and updated as appropriate: allergies, current medications, past family history, past medical history, past social history, past surgical history and problem list.    Review of Systems   Constitutional: Negative for activity change, appetite change, fatigue and unexpected weight change.   HENT: Positive for postnasal drip, sinus pressure, sinus pain and voice change. Negative for congestion, ear discharge and ear pain.    Eyes: Positive for redness and itching. Negative for pain and visual disturbance.   Respiratory: Negative for cough, chest tightness, shortness of breath and wheezing.    Cardiovascular: Negative for chest pain, palpitations and leg swelling.   Gastrointestinal: Negative.    Endocrine: Positive for cold intolerance. Negative for heat intolerance.   Genitourinary: Negative.    Musculoskeletal: Positive for back pain and neck pain. Negative for gait problem, joint swelling and neck stiffness.   Skin: Negative for color change, rash and wound.   Allergic/Immunologic: Negative.    Neurological: Positive for dizziness and numbness. Negative for tremors, weakness, light-headedness and headaches. Seizures: rt calf.    Hematological: Does not bruise/bleed easily.   Psychiatric/Behavioral: Negative for agitation, behavioral problems, confusion, decreased concentration and suicidal ideas. The patient is hyperactive. The patient is not nervous/anxious.       I have reviewed the review of systems above performed by my medical assistant.      Vitals:    23 1113   BP: 116/78   Pulse: 88   SpO2: 100%       Neurologic Exam     Mental Status   Oriented to person, place, and time.   Concentration: normal.   Level of consciousness: alert  Knowledge: consistent with education (No deficits found.).     Cranial Nerves     CN II   Visual fields full to confrontation.     CN III, IV, VI   Pupils are equal, round, and reactive to light.  Extraocular motions are normal.   CN III: no CN III palsy  CN VI: no CN VI palsy    CN V   Facial sensation intact.     CN VII   Facial expression full, symmetric.     CN VIII   CN VIII normal.     CN IX, X   CN IX normal.   CN X normal.     CN XI   CN XI normal.     CN XII   CN XII normal.     Motor Exam     Strength   Right neck flexion: 5/5  Left neck flexion: 5/5  Right neck extension: 5/5  Left neck extension: 5/5  Right deltoid: 5/5  Left deltoid: 5/5  Right biceps: 5/5  Left biceps: 5/5  Right triceps: 5/5  Left triceps: 5/5  Right wrist flexion: 5/5  Left wrist flexion: 5/5  Right wrist extension: 5/5  Left wrist extension: 5/5  Right interossei: 5/5  Left interossei: 5/5  Right abdominals: 5/5  Left abdominals: 5/5  Right iliopsoas: 5/5  Left iliopsoas: 5/5  Right quadriceps: 5/5  Left quadriceps: 5/5  Right hamstrin/5  Left hamstrin/5  Right glutei: 5/5  Left glutei: 5/5  Right anterior tibial: 5/5  Left anterior tibial: 5/5  Right posterior tibial: 5/5  Left posterior tibial: 5/5  Right peroneal: 5/5  Left peroneal: 5/5  Right gastroc: 5/5  Left gastroc: 5/5    Sensory Exam   Light touch normal.   Vibration normal.     Gait, Coordination, and Reflexes     Gait  Gait: normal    Reflexes    Right brachioradialis: 2+  Left brachioradialis: 2+  Right biceps: 2+  Left biceps: 2+  Right triceps: 2+  Left triceps: 2+  Right patellar: 2+  Left patellar: 2+  Right achilles: 2+  Left achilles: 2+  Right : 2+  Left : 2+Station is normal.       Physical Exam  Vitals reviewed.   Constitutional:       Appearance: She is well-developed.   HENT:      Head: Normocephalic and atraumatic.   Eyes:      Extraocular Movements: EOM normal.      Pupils: Pupils are equal, round, and reactive to light.   Cardiovascular:      Rate and Rhythm: Normal rate and regular rhythm.   Pulmonary:      Breath sounds: Normal breath sounds.   Musculoskeletal:         General: Normal range of motion.   Skin:     General: Skin is warm.   Neurological:      Mental Status: She is oriented to person, place, and time.      Gait: Gait is intact.      Deep Tendon Reflexes:      Reflex Scores:       Tricep reflexes are 2+ on the right side and 2+ on the left side.       Bicep reflexes are 2+ on the right side and 2+ on the left side.       Brachioradialis reflexes are 2+ on the right side and 2+ on the left side.       Patellar reflexes are 2+ on the right side and 2+ on the left side.       Achilles reflexes are 2+ on the right side and 2+ on the left side.        Procedures    Assessment/Plan: We are going to continue her current dosing of the gabapentin and see her back in 6 months or sooner if needed.  A total of 15 minutes was spent face-to-face with the patient today.  Of that greater than 50% of this time was spent discussing signs and symptoms of occipital neuralgia, patient education, plan of care and prognosis.         Diagnoses and all orders for this visit:    1. Occipital neuralgia of right side (Primary)    2. Chronic neck pain           Arnulfo Judd II, MD

## 2023-06-15 ENCOUNTER — LAB (OUTPATIENT)
Dept: LAB | Facility: HOSPITAL | Age: 43
End: 2023-06-15
Payer: COMMERCIAL

## 2023-06-15 ENCOUNTER — TRANSCRIBE ORDERS (OUTPATIENT)
Dept: URGENT CARE | Facility: CLINIC | Age: 43
End: 2023-06-15
Payer: COMMERCIAL

## 2023-06-15 DIAGNOSIS — E78.2 MIXED HYPERLIPIDEMIA: ICD-10-CM

## 2023-06-15 DIAGNOSIS — K52.9 GASTROENTERITIS: ICD-10-CM

## 2023-06-15 DIAGNOSIS — E05.90 HYPERTHYROIDISM: Primary | ICD-10-CM

## 2023-06-15 DIAGNOSIS — E78.5 HYPERLIPIDEMIA, UNSPECIFIED HYPERLIPIDEMIA TYPE: ICD-10-CM

## 2023-06-15 DIAGNOSIS — E05.90 HYPERTHYROIDISM: ICD-10-CM

## 2023-06-15 LAB
25(OH)D3 SERPL-MCNC: 46.8 NG/ML (ref 30–100)
ALBUMIN SERPL-MCNC: 4 G/DL (ref 3.5–5.2)
ALBUMIN/GLOB SERPL: 1.4 G/DL
ALP SERPL-CCNC: 37 U/L (ref 39–117)
ALT SERPL W P-5'-P-CCNC: 9 U/L (ref 1–33)
ANION GAP SERPL CALCULATED.3IONS-SCNC: 7.6 MMOL/L (ref 5–15)
AST SERPL-CCNC: 16 U/L (ref 1–32)
BASOPHILS # BLD AUTO: 0.03 10*3/MM3 (ref 0–0.2)
BASOPHILS NFR BLD AUTO: 1 % (ref 0–1.5)
BILIRUB SERPL-MCNC: 0.4 MG/DL (ref 0–1.2)
BUN SERPL-MCNC: 11 MG/DL (ref 6–20)
BUN/CREAT SERPL: 13.6 (ref 7–25)
CALCIUM SPEC-SCNC: 8.9 MG/DL (ref 8.6–10.5)
CHLORIDE SERPL-SCNC: 106 MMOL/L (ref 98–107)
CHOLEST SERPL-MCNC: 119 MG/DL (ref 0–200)
CO2 SERPL-SCNC: 26.4 MMOL/L (ref 22–29)
CREAT SERPL-MCNC: 0.81 MG/DL (ref 0.57–1)
DEPRECATED RDW RBC AUTO: 42.1 FL (ref 37–54)
EGFRCR SERPLBLD CKD-EPI 2021: 91.4 ML/MIN/1.73
EOSINOPHIL # BLD AUTO: 0.07 10*3/MM3 (ref 0–0.4)
EOSINOPHIL NFR BLD AUTO: 2.2 % (ref 0.3–6.2)
ERYTHROCYTE [DISTWIDTH] IN BLOOD BY AUTOMATED COUNT: 12.7 % (ref 12.3–15.4)
GLOBULIN UR ELPH-MCNC: 2.8 GM/DL
GLUCOSE SERPL-MCNC: 79 MG/DL (ref 65–99)
HCT VFR BLD AUTO: 39.8 % (ref 34–46.6)
HDLC SERPL-MCNC: 54 MG/DL (ref 40–60)
HGB BLD-MCNC: 13.2 G/DL (ref 12–15.9)
IMM GRANULOCYTES # BLD AUTO: 0 10*3/MM3 (ref 0–0.05)
IMM GRANULOCYTES NFR BLD AUTO: 0 % (ref 0–0.5)
LDLC SERPL CALC-MCNC: 52 MG/DL (ref 0–100)
LDLC/HDLC SERPL: 0.99 {RATIO}
LYMPHOCYTES # BLD AUTO: 1.21 10*3/MM3 (ref 0.7–3.1)
LYMPHOCYTES NFR BLD AUTO: 38.4 % (ref 19.6–45.3)
MCH RBC QN AUTO: 29.8 PG (ref 26.6–33)
MCHC RBC AUTO-ENTMCNC: 33.2 G/DL (ref 31.5–35.7)
MCV RBC AUTO: 89.8 FL (ref 79–97)
MONOCYTES # BLD AUTO: 0.37 10*3/MM3 (ref 0.1–0.9)
MONOCYTES NFR BLD AUTO: 11.7 % (ref 5–12)
NEUTROPHILS NFR BLD AUTO: 1.47 10*3/MM3 (ref 1.7–7)
NEUTROPHILS NFR BLD AUTO: 46.7 % (ref 42.7–76)
NRBC BLD AUTO-RTO: 0 /100 WBC (ref 0–0.2)
PLATELET # BLD AUTO: 225 10*3/MM3 (ref 140–450)
PMV BLD AUTO: 10.4 FL (ref 6–12)
POTASSIUM SERPL-SCNC: 4.1 MMOL/L (ref 3.5–5.2)
PROT SERPL-MCNC: 6.8 G/DL (ref 6–8.5)
RBC # BLD AUTO: 4.43 10*6/MM3 (ref 3.77–5.28)
SODIUM SERPL-SCNC: 140 MMOL/L (ref 136–145)
T4 FREE SERPL-MCNC: 1.57 NG/DL (ref 0.93–1.7)
TRIGL SERPL-MCNC: 58 MG/DL (ref 0–150)
TSH SERPL DL<=0.05 MIU/L-ACNC: 1.72 UIU/ML (ref 0.27–4.2)
VLDLC SERPL-MCNC: 13 MG/DL (ref 5–40)
WBC NRBC COR # BLD: 3.15 10*3/MM3 (ref 3.4–10.8)

## 2023-06-15 PROCEDURE — 80050 GENERAL HEALTH PANEL: CPT

## 2023-06-15 PROCEDURE — 36415 COLL VENOUS BLD VENIPUNCTURE: CPT

## 2023-06-15 PROCEDURE — 84439 ASSAY OF FREE THYROXINE: CPT

## 2023-06-15 PROCEDURE — 80061 LIPID PANEL: CPT

## 2023-06-15 PROCEDURE — 82306 VITAMIN D 25 HYDROXY: CPT

## 2023-07-03 NOTE — PROGRESS NOTES
Chief complaint: Occipital neuralgia, neck pain    Patient ID: Valentina Anderson is a 40 y.o. female.    HPI:   This was an audio and video enabled telemedicine encounter.  The patient did consent to this type of encounter.  She is in her vehicle and I am here at the neurology clinic.  I had the pleasure of seeing Valentina in having a visit via video today.  As you may know she is a 40-year-old female with a history of neck pain and occipital neuralgia with chronic headache.  We did perform an occipital nerve block for her which significantly improved her headaches.  She also received physical therapy which was helpful.  She denies any further headache or chronic neck issues.  She has been doing well otherwise.  No new symptoms neurologically.    The following portions of the patient's history were reviewed and updated as appropriate: allergies, current medications, past family history, past medical history, past social history, past surgical history and problem list.    Review of Systems   I have reviewed the review of systems above performed by my medical assistant.    There were no vitals filed for this visit.    Neurologic Exam patient is alert and oriented x3.  No facial droop.  No aphasia.    Physical Exam    Procedures    Assessment/Plan: At this point we will see her here on an as-needed basis since she has no significant pain.  May consider occipital nerve block in the future if needed.  A total of 15 minutes was spent during this encounter today.  Of that greater than 50% of this time was spent discussing signs and symptoms of chronic neck pain, occipital neuralgia, patient education, plan of care and prognosis.       Diagnoses and all orders for this visit:    1. Occipital neuralgia of right side (Primary)    2. Chronic neck pain           Arnulfo Judd II, MD           U as planned

## 2023-07-20 ENCOUNTER — TRANSCRIBE ORDERS (OUTPATIENT)
Dept: ADMINISTRATIVE | Facility: HOSPITAL | Age: 43
End: 2023-07-20
Payer: COMMERCIAL

## 2023-07-20 ENCOUNTER — LAB (OUTPATIENT)
Dept: LAB | Facility: HOSPITAL | Age: 43
End: 2023-07-20
Payer: COMMERCIAL

## 2023-07-20 DIAGNOSIS — E03.9 HYPOTHYROIDISM, UNSPECIFIED TYPE: Primary | ICD-10-CM

## 2023-07-20 DIAGNOSIS — R63.4 WEIGHT LOSS: ICD-10-CM

## 2023-07-20 DIAGNOSIS — Z90.3 H/O GASTRIC SLEEVE: ICD-10-CM

## 2023-07-20 DIAGNOSIS — D72.10 EOSINOPHILIA, UNSPECIFIED TYPE: ICD-10-CM

## 2023-07-20 DIAGNOSIS — E03.9 HYPOTHYROIDISM, UNSPECIFIED TYPE: ICD-10-CM

## 2023-07-20 PROCEDURE — 83550 IRON BINDING TEST: CPT

## 2023-07-20 PROCEDURE — 82746 ASSAY OF FOLIC ACID SERUM: CPT

## 2023-07-20 PROCEDURE — 85045 AUTOMATED RETICULOCYTE COUNT: CPT

## 2023-07-20 PROCEDURE — 85025 COMPLETE CBC W/AUTO DIFF WBC: CPT

## 2023-07-20 PROCEDURE — 82607 VITAMIN B-12: CPT

## 2023-07-20 PROCEDURE — 86376 MICROSOMAL ANTIBODY EACH: CPT

## 2023-07-20 PROCEDURE — 83540 ASSAY OF IRON: CPT

## 2023-07-20 PROCEDURE — 36415 COLL VENOUS BLD VENIPUNCTURE: CPT

## 2023-07-20 PROCEDURE — 82728 ASSAY OF FERRITIN: CPT

## 2023-07-21 LAB
BASOPHILS # BLD AUTO: 0.1 X10E3/UL (ref 0–0.2)
BASOPHILS NFR BLD AUTO: 1 %
EOSINOPHIL # BLD AUTO: 0.1 X10E3/UL (ref 0–0.4)
EOSINOPHIL NFR BLD AUTO: 2 %
ERYTHROCYTE [DISTWIDTH] IN BLOOD BY AUTOMATED COUNT: 13.6 % (ref 11.7–15.4)
FERRITIN SERPL-MCNC: 130 NG/ML (ref 15–150)
FOLATE SERPL-MCNC: 7 NG/ML
HCT VFR BLD AUTO: 42.4 % (ref 34–46.6)
HGB BLD-MCNC: 13.4 G/DL (ref 11.1–15.9)
IMM GRANULOCYTES # BLD AUTO: 0 X10E3/UL (ref 0–0.1)
IMM GRANULOCYTES NFR BLD AUTO: 0 %
IRON SATN MFR SERPL: 39 % (ref 15–55)
IRON SERPL-MCNC: 112 UG/DL (ref 27–159)
LYMPHOCYTES # BLD AUTO: 2.2 X10E3/UL (ref 0.7–3.1)
LYMPHOCYTES NFR BLD AUTO: 43 %
MCH RBC QN AUTO: 29.8 PG (ref 26.6–33)
MCHC RBC AUTO-ENTMCNC: 31.6 G/DL (ref 31.5–35.7)
MCV RBC AUTO: 94 FL (ref 79–97)
MONOCYTES # BLD AUTO: 0.5 X10E3/UL (ref 0.1–0.9)
MONOCYTES NFR BLD AUTO: 9 %
NEUTROPHILS # BLD AUTO: 2.4 X10E3/UL (ref 1.4–7)
NEUTROPHILS NFR BLD AUTO: 45 %
PLATELET # BLD AUTO: 216 X10E3/UL (ref 150–450)
RBC # BLD AUTO: 4.5 X10E6/UL (ref 3.77–5.28)
RETICS/RBC NFR AUTO: 0.6 % (ref 0.6–2.6)
THYROPEROXIDASE AB SERPL-ACNC: <9 IU/ML (ref 0–34)
TIBC SERPL-MCNC: 288 UG/DL (ref 250–450)
UIBC SERPL-MCNC: 176 UG/DL (ref 131–425)
VIT B12 SERPL-MCNC: 219 PG/ML (ref 232–1245)
WBC # BLD AUTO: 5.2 X10E3/UL (ref 3.4–10.8)

## 2023-07-25 ENCOUNTER — OFFICE VISIT (OUTPATIENT)
Dept: BARIATRICS/WEIGHT MGMT | Facility: CLINIC | Age: 43
End: 2023-07-25
Payer: COMMERCIAL

## 2023-07-25 VITALS
HEART RATE: 74 BPM | HEIGHT: 67 IN | TEMPERATURE: 98.2 F | BODY MASS INDEX: 23.07 KG/M2 | WEIGHT: 147 LBS | DIASTOLIC BLOOD PRESSURE: 76 MMHG | SYSTOLIC BLOOD PRESSURE: 110 MMHG

## 2023-07-25 DIAGNOSIS — Z98.84 S/P LAPAROSCOPIC SLEEVE GASTRECTOMY: Primary | ICD-10-CM

## 2023-07-25 DIAGNOSIS — E55.9 VITAMIN D DEFICIENCY: ICD-10-CM

## 2023-07-25 DIAGNOSIS — B37.2 INTERTRIGINOUS CANDIDIASIS: ICD-10-CM

## 2023-07-25 DIAGNOSIS — Z71.3 DIETARY COUNSELING: ICD-10-CM

## 2023-07-25 PROCEDURE — 99213 OFFICE O/P EST LOW 20 MIN: CPT | Performed by: NURSE PRACTITIONER

## 2023-07-25 NOTE — PROGRESS NOTES
[facility]ATR[facility] BARIATRIC SURGERY  [address] [address]  [phone]  [address] [id number] [phone]  Dept: [phone]  7/25/2023      Valentina Anderson.  [id number]  [id number]  1980  female      Chief Complaint   Patient presents with    Follow-up     Follow up sleeve / wegovy        BH Post-Op Bariatric Surgery:   Valentina Anderson is status post Laparoscopic Sleeve with PEHR procedure, performed on 11/4/2020     HPI:   Today's weight is 66.7 kg (147 lb) pounds, today's BMI is Body mass index is 23.02 kg/m²., has a  loss of 12 pounds since the last visit and weight loss since surgery is 156 pounds. The patient reports a decreased portion size and loss of appetite.      Valentina Anderson denies nausea, vomiting, reflux, dysphagia and reports tolerating regular diet.  Tolerating Wegovy 2.4 mg.  Haivng some constipation/diarrhea issues.  Has appt with GI.    Continuing to use Nystatin powder between skin folds.      Diet and Exercise: Diet history reviewed and discussed with the patient. Weight loss/gains to date discussed with the patient. The patient states they are eating 80 grams of protein per day. She reports eating 3 meals per day, a typical portion size of 1/2 cup, eating 1 snacks per day, drinking 5+ or more 8-oz. glasses of water per day, no carbonated beverage consumption and exercising regularly.     Supplements: none.     Review of Systems   Constitutional:  Positive for activity change.   Respiratory:  Negative for shortness of breath.    Cardiovascular:  Negative for chest pain.   All other systems reviewed and are negative.    Patient Active Problem List   Diagnosis    Chiari malformation type I    Dizziness    New daily persistent headache    History of brain surgery    Degeneration of intervertebral disc at C6-C7 level    Bilateral occipital neuralgia    Chronic fatigue    Multiple joint pain     Anxiety and depression    Hypothyroid    PCOS (polycystic ovarian syndrome)    Endometriosis    Multiple gastric polyps    S/P laparoscopic sleeve gastrectomy    Dietary counseling    Abnormal electrocardiogram    Vitamin D deficiency    Intertriginous candidiasis    Body mass index (BMI) 24.0-24.9, adult       Past Medical History:   Diagnosis Date    Chiari I malformation     DDD (degenerative disc disease), cervical     DDD (degenerative disc disease), cervical     Disease of thyroid gland     Dizziness     Endometriosis     Fibromyalgia, primary Nov 2022    GERD (gastroesophageal reflux disease)     Headache     Heart palpitations     Hiatal hernia     Migraine Aug 2019    Peripheral neuropathy July 2019    Right greater than left arm/hands    PONV (postoperative nausea and vomiting)        The following portions of the patient's history were reviewed and updated as appropriate: allergies, current medications, past medical history, past surgical history, and problem list.    Vitals:    07/25/23 1534   BP: 110/76   Pulse: 74   Temp: 98.2 °F (36.8 °C)       Physical Exam  Vitals reviewed.   Constitutional:       General: She is not in acute distress.     Appearance: Normal appearance. She is well-developed, well-groomed and normal weight.   HENT:      Head: Normocephalic and atraumatic.      Mouth/Throat:      Mouth: Mucous membranes are moist.      Pharynx: Oropharynx is clear.   Eyes:      General: No scleral icterus.     Extraocular Movements: Extraocular movements intact.      Conjunctiva/sclera: Conjunctivae normal.      Pupils: Pupils are equal, round, and reactive to light.   Cardiovascular:      Rate and Rhythm: Normal rate and regular rhythm.   Pulmonary:      Effort: Pulmonary effort is normal. No respiratory distress.   Abdominal:      General: Bowel sounds are normal.      Palpations: Abdomen is soft.   Musculoskeletal:         General: Normal range of motion.      Cervical back: Normal range of motion  and neck supple.   Skin:     General: Skin is warm and dry.   Neurological:      General: No focal deficit present.      Mental Status: She is alert and oriented to person, place, and time.   Psychiatric:         Mood and Affect: Mood normal.         Behavior: Behavior normal. Behavior is cooperative.         Thought Content: Thought content normal.         Judgment: Judgment normal.       Assessment:   Post-op, the patient is doing well.     Encounter Diagnoses   Name Primary?    S/P laparoscopic sleeve gastrectomy Yes    Vitamin D deficiency     Dietary counseling        Plan:   Discussed fiber supplementation  Discussed could go to taking wegovy every 10-14 days vs every 7.  Could go down in dose if continues to lose weight.  Wants to maintain 150-153#  Plastic surgery consult in December  Graduates with MSN next spring  Encouraged patient to be sure to get plenty of lean protein per day through small frequent meals all with a protein source.   Activity restrictions: none.   Recommended patient be sure to get at least 70 grams of protein per day by eating small, frequent meals all with high lean protein choices. Be sure to limit/cut back on daily carbohydrate intake. Discussed with the patient the recommended amount of water per day to intake- half of body weight in ounces. Reviewed vitamin requirements. Be sure to do routine exercise, 150 minutes per week minimum, including both cardio and strength training.     Instructions / Recommendations: dietary counseling recommended, recommended a daily protein intake of  grams, vitamin supplement(s) recommended, recommended exercising at least 150 minutes per week, behavior modifications recommended and instructed to call the office for concerns, questions, or problems.     The patient was instructed to follow up in 6 months.     Total time spent during this encounter today was 25 minutes

## 2023-08-11 DIAGNOSIS — M54.6 CHRONIC RIGHT-SIDED THORACIC BACK PAIN: ICD-10-CM

## 2023-08-11 DIAGNOSIS — G89.29 CHRONIC NECK PAIN: ICD-10-CM

## 2023-08-11 DIAGNOSIS — G89.29 CHRONIC RIGHT-SIDED THORACIC BACK PAIN: ICD-10-CM

## 2023-08-11 DIAGNOSIS — M54.81 OCCIPITAL NEURALGIA OF RIGHT SIDE: ICD-10-CM

## 2023-08-11 DIAGNOSIS — M54.2 CHRONIC NECK PAIN: ICD-10-CM

## 2023-08-11 RX ORDER — GABAPENTIN 100 MG/1
200 CAPSULE ORAL 3 TIMES DAILY
Qty: 180 CAPSULE | Refills: 2 | Status: SHIPPED | OUTPATIENT
Start: 2023-08-11 | End: 2023-09-13

## 2023-09-12 ENCOUNTER — HOSPITAL ENCOUNTER (OUTPATIENT)
Dept: MAMMOGRAPHY | Facility: HOSPITAL | Age: 43
Discharge: HOME | End: 2023-09-12
Admitting: OBSTETRICS & GYNECOLOGY
Payer: COMMERCIAL

## 2023-09-12 DIAGNOSIS — Z12.31 ENCOUNTER FOR SCREENING MAMMOGRAM FOR MALIGNANT NEOPLASM OF BREAST: ICD-10-CM

## 2023-09-12 PROCEDURE — 77063 BREAST TOMOSYNTHESIS BI: CPT

## 2023-09-12 PROCEDURE — 77067 SCR MAMMO BI INCL CAD: CPT

## 2023-10-03 RX ORDER — SEMAGLUTIDE 2.4 MG/.75ML
2.4 INJECTION, SOLUTION SUBCUTANEOUS WEEKLY
Qty: 9 ML | Refills: 1 | Status: SHIPPED | OUTPATIENT
Start: 2023-10-03 | End: 2023-12-26

## 2023-11-17 ENCOUNTER — TELEPHONE (OUTPATIENT)
Dept: GASTROENTEROLOGY | Facility: CLINIC | Age: 43
End: 2023-11-17

## 2023-11-17 ENCOUNTER — OFFICE VISIT (OUTPATIENT)
Dept: GASTROENTEROLOGY | Facility: CLINIC | Age: 43
End: 2023-11-17
Payer: COMMERCIAL

## 2023-11-17 VITALS
DIASTOLIC BLOOD PRESSURE: 77 MMHG | SYSTOLIC BLOOD PRESSURE: 111 MMHG | BODY MASS INDEX: 23.7 KG/M2 | WEIGHT: 151 LBS | HEART RATE: 81 BPM | HEIGHT: 67 IN

## 2023-11-17 DIAGNOSIS — K59.00 CONSTIPATION, UNSPECIFIED CONSTIPATION TYPE: ICD-10-CM

## 2023-11-17 DIAGNOSIS — R19.4 ALTERED BOWEL HABITS: Primary | ICD-10-CM

## 2023-11-17 DIAGNOSIS — R19.7 DIARRHEA, UNSPECIFIED TYPE: ICD-10-CM

## 2023-11-17 RX ORDER — SODIUM PICOSULFATE, MAGNESIUM OXIDE, AND ANHYDROUS CITRIC ACID 10; 3.5; 12 MG/160ML; G/160ML; G/160ML
1 LIQUID ORAL TAKE AS DIRECTED
Qty: 350 ML | Refills: 0 | COMMUNITY
Start: 2023-11-17

## 2023-11-17 RX ORDER — SODIUM PICOSULFATE, MAGNESIUM OXIDE, AND ANHYDROUS CITRIC ACID 12; 3.5; 1 G/175ML; G/175ML; MG/175ML
175 LIQUID ORAL TAKE AS DIRECTED
Qty: 175 ML | Refills: 0 | Status: SHIPPED | OUTPATIENT
Start: 2023-11-17

## 2023-11-17 NOTE — PROGRESS NOTES
Chief Complaint  No chief complaint on file.    Valentina Anderson is a 43 y.o. female who presents to [facility] GASTROENTEROLOGY- [facility] on referral from SAL Matthews for a gastroenterology evaluation of diarrhea and LLQ pain.      History of Present Illness  New patient presents to the office for diarrhea and LLQ pain. Patient has history of gastric sleeve 2020 reports 176 weight loss since surgery. She did reach a plateau in weight loss therefor Wegovy was added. She has been struggling with constipation followed by episodes of diarrhea. She will go 2-3 days without a bowel movement. Can have some intermittent LLQ pain prior to bowel movement. Has noticed blood when wiping but not in the stool. Denies melena. Denies family history of colon cancer. Reflux symptoms are well controlled with Omeprazole 20mg daily. Denies breakthrough heartburn, nausea, vomiting, epigastric pain, and dysphagia.     Past Medical History:   Diagnosis Date    Chiari I malformation     DDD (degenerative disc disease), cervical     DDD (degenerative disc disease), cervical     Disease of thyroid gland     Dizziness     Endometriosis     Fibromyalgia, primary 2022    GERD (gastroesophageal reflux disease)     Headache     Heart palpitations     Hiatal hernia     Migraine Aug 2019    Peripheral neuropathy 2019    Right greater than left arm/hands    PONV (postoperative nausea and vomiting)        Past Surgical History:   Procedure Laterality Date    ANKLE SURGERY Right 2014    X2    CERVICAL CHIARI DECOMPRESSION POSTERIOR N/A 10/25/2016    Procedure: CERVICAL CHIARI DECOMPRESSION POSTERIOR, posterior fossa decompression, C1, C2 laminectomy and expansive duraplasty.;  Surgeon: Jim Santos MD;  Location: [facility] OR;  Service:      SECTION  ,    COLONOSCOPY      CRANIOTOMY  posterior fossa decompression with duraplasy    Oct 2016    DIAGNOSTIC LAPAROSCOPY  2008    X2     "ENDOSCOPY  04/2018    ENDOSCOPY N/A 09/29/2020    Procedure: ESOPHAGOGASTRODUODENOSCOPY WITH BIOPSY;  Surgeon: Marc Ramirez Jr., MD;  Location: [facility] ENDOSCOPY;  Service: General;  Laterality: N/A;  PRE-GERD, H/O GASTRIC POLYPS  POST- GASTRIC POLYPS, HIATAL HERNIA, GASTRITIS    EYE SURGERY  12/05/2022    PRK    GASTRIC SLEEVE LAPAROSCOPIC N/A 11/04/2020    Procedure: GASTRIC SLEEVE LAPAROSCOPIC With PARAESOPHAGEAL Hernia Repair;  Surgeon: Marc Ramirez Jr., MD;  Location:  [facility];  Service: Bariatric;  Laterality: N/A;    KNEE SURGERY      X6, before age 19    LAMINECTOMY  Oct 2016    C1 laminectomy    UPPER GASTROINTESTINAL ENDOSCOPY           Current Outpatient Medications:     cyanocobalamin 1000 MCG/ML injection, Inject 1ml into the muscle each week x 4 weeks then 1ml every 14 days x 2 weeks, then 1ml every 30 days, Disp: 10 mL, Rfl: 2    DULoxetine (CYMBALTA) 30 MG capsule, Take 1 capsule by mouth Daily., Disp: 30 capsule, Rfl: 5    gabapentin (NEURONTIN) 100 MG capsule, Take 2 capsules by mouth 3 (Three) Times a Day for 30 days., Disp: 180 capsule, Rfl: 2    levothyroxine (SYNTHROID, LEVOTHROID) 75 MCG tablet, TAKE 1 TABLET DAILY AS DIRECTED., Disp: 90 tablet, Rfl: 1    Norethin-Eth Estrad-Fe Biphas (LO LOESTRIN FE) 1 MG-10 MCG / 10 MCG tablet, Take 1 tablet by mouth Daily., Disp: 84 tablet, Rfl: 3    nystatin (MYCOSTATIN) 190714 UNIT/GM powder, Apply 1 application topically to the appropriate area as directed 3 (Three) Times a Day., Disp: 60 g, Rfl: 3    omeprazole (priLOSEC) 20 MG capsule, Take 1 capsule by mouth Daily., Disp: , Rfl:     Semaglutide-Weight Management (Wegovy) 2.4 MG/0.75ML solution auto-injector, Inject 2.4 mg under the skin into the appropriate area as directed 1 (One) Time Per Week for 84 days., Disp: 9 mL, Rfl: 1    Syringe/Needle, Disp, (Luer Lock Safety Syringes) 22G X 1-1/2\" 3 ML misc, use as directed once weekly, Disp: 100 each, Rfl: 1    Alcohol Swabs pads, Apply " 1 application topically once a week. (Patient not taking: Reported on 11/17/2023), Disp: 50 each, Rfl: 2    DULoxetine (CYMBALTA) 30 MG capsule, Take 1 capsule by mouth daily., Disp: 90 capsule, Rfl: 0    meloxicam (MOBIC) 15 MG tablet, Take 1 tablet by mouth daily., Disp: 90 tablet, Rfl: 1    Norethin-Eth Estrad-Fe Biphas (Lo Loestrin Fe) 1 MG-10 MCG / 10 MCG tablet, Take 1 tablet by mouth Daily., Disp: 84 tablet, Rfl: 0    Sod Picosulfate-Mag Ox-Cit Acd (Clenpiq) 10-3.5-12 MG-GM -GM/175ML solution, Take 175 mL by mouth Take As Directed. Please follow colon prep instructions provided by office., Disp: 175 mL, Rfl: 0     Allergies   Allergen Reactions    Penicillins Hives and Rash    Sulfa Antibiotics Hives and Rash       Family History   Problem Relation Age of Onset    Hypertension Mother     Obesity Mother     Cardiomyopathy Father     Obesity Father     Hypertension Father     Heart disease Father     Sleep apnea Father     Obesity Brother     Sleep apnea Brother     Mental retardation Maternal Aunt     Parkinsonism Paternal Uncle     Obesity Maternal Grandmother     Stroke Maternal Grandmother     Hypertension Paternal Grandmother     Heart attack Paternal Grandmother     Heart disease Paternal Grandmother     Parkinsonism Paternal Grandmother     Hypertension Paternal Grandfather     Heart attack Paternal Grandfather     Heart disease Paternal Grandfather     Malig Hyperthermia Neg Hx     Colon cancer Neg Hx         Social History     Social History Narrative    Not on file       Immunization:  Immunization History   Administered Date(s) Administered    COVID-19 (MODERNA) 1st,2nd,3rd Dose Monovalent 12/28/2020, 01/21/2021    COVID-19 (MODERNA) Monovalent Original Booster 12/06/2021    Flu Vaccine Quad PF >36MO 10/01/2018    Fluzone (or Fluarix & Flulaval for VFC) >6mos 10/02/2021    Influenza, Unspecified 10/07/2022        Objective     Vital Signs:   /77 (BP Location: Left arm, Patient Position:  "Sitting, Cuff Size: Adult)   Pulse 81   Ht 170.2 cm (67.01\")   Wt 68.5 kg (151 lb)   BMI 23.64 kg/m²       Physical Exam  Constitutional:       Appearance: Normal appearance.   HENT:      Head: Normocephalic.   Cardiovascular:      Rate and Rhythm: Normal rate and regular rhythm.      Heart sounds: Normal heart sounds.   Pulmonary:      Effort: Pulmonary effort is normal.      Breath sounds: Normal breath sounds.   Abdominal:      General: Bowel sounds are normal.      Palpations: Abdomen is soft.   Skin:     General: Skin is warm and dry.   Neurological:      Mental Status: She is alert and oriented to person, place, and time. Mental status is at baseline.   Psychiatric:         Mood and Affect: Mood normal.         Behavior: Behavior normal.         Thought Content: Thought content normal.         Judgment: Judgment normal.         Result Review :     CBC w/diff          3/3/2023    08:36 6/15/2023    09:21 7/20/2023    07:51   CBC w/Diff   WBC 3.26  3.15  5.2    RBC 4.45  4.43  4.50    Hemoglobin 13.5  13.2  13.4    Hematocrit 39.6  39.8  42.4    MCV 89.0  89.8  94    MCH 30.3  29.8  29.8    MCHC 34.1  33.2  31.6    RDW 13.5  12.7  13.6    Platelets 229  225  216    Neutrophil Rel % 42.0  46.7  45    Immature Granulocyte Rel % 0.0  0.0     Lymphocyte Rel % 44.8  38.4  43    Monocyte Rel % 9.2  11.7  9    Eosinophil Rel % 3.1  2.2  2    Basophil Rel % 0.9  1.0  1      CMP          3/3/2023    08:36 6/15/2023    09:21   CMP   Glucose 77  79    BUN 10  11    Creatinine 0.77  0.81    EGFR 97.1  91.4    Sodium 138  140    Potassium 4.0  4.1    Chloride 107  106    Calcium 9.0  8.9    Total Protein 6.6  6.8    Albumin 4.1  4.0    Globulin 2.5  2.8    Total Bilirubin 0.5  0.4    Alkaline Phosphatase 38  37    AST (SGOT) 23  16    ALT (SGPT) 14  9    Albumin/Globulin Ratio 1.6  1.4    BUN/Creatinine Ratio 13.0  13.6    Anion Gap 6.1  7.6                Assessment and Plan    Diagnoses and all orders for this " visit:    1. Altered bowel habits (Primary)  -     Case Request; Standing  -     Follow Anesthesia Guidelines / Protocol; Standing  -     Obtain Informed Consent; Standing  -     Verify NPO; Standing  -     Case Request    2. Constipation, unspecified constipation type  -     Case Request; Standing  -     Follow Anesthesia Guidelines / Protocol; Standing  -     Obtain Informed Consent; Standing  -     Verify NPO; Standing  -     Case Request    3. Diarrhea, unspecified type  -     Case Request; Standing  -     Follow Anesthesia Guidelines / Protocol; Standing  -     Obtain Informed Consent; Standing  -     Verify NPO; Standing  -     Case Request    Other orders  -     Sod Picosulfate-Mag Ox-Cit Acd (Clenpiq) 10-3.5-12 MG-GM -GM/175ML solution; Take 175 mL by mouth Take As Directed. Please follow colon prep instructions provided by office.  Dispense: 175 mL; Refill: 0    Add daily fiber and probiotic - if hard stool still persist add Miralax, educated patient on how to titrate.   Denies cardiopulmonary history  COLONOSCOPY Surgical Risk and Benefits: Possible risk/complications, benefits, and alternatives to surgical or invasive procedure have been explained to patient and/or legal guardian. Risks include bleeding, infection, and perforation. Patient has been evaluated and can tolerate anesthesia and/or sedation. Risk, benefits, and alternatives to anesthesia and sedation have been explained to patient and/or legal guardian.     Follow Up   No follow-ups on file.  Patient was given instructions and counseling regarding her condition or for health maintenance advice. Please see specific information pulled into the AVS if appropriate.

## 2023-11-17 NOTE — TELEPHONE ENCOUNTER
Patient called and stated bowel prep sent to pharmacy was not covered by insurance. Unable to use 4L due to gastric sleeve. Will provide patient with clenpiq sample. Patient will  today.

## 2023-11-20 ENCOUNTER — ANESTHESIA EVENT (OUTPATIENT)
Dept: GASTROENTEROLOGY | Facility: HOSPITAL | Age: 43
End: 2023-11-20
Payer: COMMERCIAL

## 2023-11-20 NOTE — ANESTHESIA PREPROCEDURE EVALUATION
" Anesthesia Evaluation     history of anesthetic complications (states \"only after GA, not after MAC\"):  PONV  NPO Solid Status: > 8 hours  NPO Liquid Status: > 2 hours           Airway   Mallampati: I  TM distance: >3 FB  No difficulty expected  Dental - normal exam     Pulmonary - normal exam   Cardiovascular - negative cardio ROS and normal exam  Exercise tolerance: good (4-7 METS)        Neuro/Psych  (+) headaches (migraine, occipital neuralgia), dizziness/light headedness, psychiatric history Anxiety and Depression  GI/Hepatic/Renal/Endo    (+) hiatal hernia, GERD, thyroid problem hypothyroidism    Musculoskeletal     (+) myalgias  Abdominal    Substance History      OB/GYN          Other   arthritis,     ROS/Med Hx Other: Last dose Wegovy 11/11       Phys Exam Other: Hx of chiari 1 malformation            Anesthesia Plan    ASA 2     general   total IV anesthesia  intravenous induction     Anesthetic plan, risks, benefits, and alternatives have been provided, discussed and informed consent has been obtained with: patient.  Pre-procedure education provided  Plan discussed with CRNA.    CODE STATUS:         "

## 2023-11-21 ENCOUNTER — ANESTHESIA (OUTPATIENT)
Dept: GASTROENTEROLOGY | Facility: HOSPITAL | Age: 43
End: 2023-11-21
Payer: COMMERCIAL

## 2023-11-21 ENCOUNTER — HOSPITAL ENCOUNTER (OUTPATIENT)
Facility: HOSPITAL | Age: 43
Setting detail: HOSPITAL OUTPATIENT SURGERY
Discharge: HOME | End: 2023-11-21
Attending: INTERNAL MEDICINE | Admitting: INTERNAL MEDICINE
Payer: COMMERCIAL

## 2023-11-21 VITALS
HEART RATE: 83 BPM | DIASTOLIC BLOOD PRESSURE: 78 MMHG | SYSTOLIC BLOOD PRESSURE: 110 MMHG | RESPIRATION RATE: 13 BRPM | BODY MASS INDEX: 23.47 KG/M2 | TEMPERATURE: 97.3 F | OXYGEN SATURATION: 97 % | WEIGHT: 149.91 LBS

## 2023-11-21 DIAGNOSIS — R19.7 DIARRHEA, UNSPECIFIED TYPE: ICD-10-CM

## 2023-11-21 DIAGNOSIS — K59.00 CONSTIPATION, UNSPECIFIED CONSTIPATION TYPE: ICD-10-CM

## 2023-11-21 DIAGNOSIS — R19.4 ALTERED BOWEL HABITS: ICD-10-CM

## 2023-11-21 LAB — B-HCG UR QL: NEGATIVE

## 2023-11-21 PROCEDURE — 81025 URINE PREGNANCY TEST: CPT

## 2023-11-21 PROCEDURE — 45380 COLONOSCOPY AND BIOPSY: CPT | Performed by: INTERNAL MEDICINE

## 2023-11-21 PROCEDURE — 25010000002 PROPOFOL 10 MG/ML EMULSION: Performed by: NURSE ANESTHETIST, CERTIFIED REGISTERED

## 2023-11-21 PROCEDURE — 25810000003 LACTATED RINGERS PER 1000 ML

## 2023-11-21 PROCEDURE — 88305 TISSUE EXAM BY PATHOLOGIST: CPT | Performed by: INTERNAL MEDICINE

## 2023-11-21 RX ORDER — LIDOCAINE HYDROCHLORIDE 20 MG/ML
INJECTION, SOLUTION EPIDURAL; INFILTRATION; INTRACAUDAL; PERINEURAL AS NEEDED
Status: DISCONTINUED | OUTPATIENT
Start: 2023-11-21 | End: 2023-11-21 | Stop reason: SURG

## 2023-11-21 RX ORDER — SODIUM CHLORIDE, SODIUM LACTATE, POTASSIUM CHLORIDE, CALCIUM CHLORIDE 600; 310; 30; 20 MG/100ML; MG/100ML; MG/100ML; MG/100ML
30 INJECTION, SOLUTION INTRAVENOUS CONTINUOUS
Status: DISCONTINUED | OUTPATIENT
Start: 2023-11-21 | End: 2023-11-21 | Stop reason: HOSPADM

## 2023-11-21 RX ADMIN — LIDOCAINE HYDROCHLORIDE 40 MG: 20 INJECTION, SOLUTION EPIDURAL; INFILTRATION; INTRACAUDAL; PERINEURAL at 13:44

## 2023-11-21 RX ADMIN — PROPOFOL 250 MCG/KG/MIN: 10 INJECTION, EMULSION INTRAVENOUS at 13:44

## 2023-11-21 RX ADMIN — SODIUM CHLORIDE, POTASSIUM CHLORIDE, SODIUM LACTATE AND CALCIUM CHLORIDE 30 ML/HR: 600; 310; 30; 20 INJECTION, SOLUTION INTRAVENOUS at 12:37

## 2023-11-21 NOTE — ANESTHESIA POSTPROCEDURE EVALUATION
Patient: Valentina Anderson    Procedure Summary       Date: 11/21/23 Room / Location: [facility] ENDOSCOPY 4 / [facility]    Anesthesia Start: 1343 Anesthesia Stop: 1411    Procedure: COLONOSCOPY WITH RANDOM COLON BIOPSIES Diagnosis:       Altered bowel habits      Constipation, unspecified constipation type      Diarrhea, unspecified type      (Altered bowel habits [R19.4])      (Constipation, unspecified constipation type [K59.00])      (Diarrhea, unspecified type [R19.7])    Surgeons: Major Mary MD Provider: Yoselin Sterling CRNA    Anesthesia Type: general ASA Status: 2            Anesthesia Type: general    Vitals  Vitals Value Taken Time   BP 95/66 11/21/23 1408   Temp 36.1 °C (97 °F) 11/21/23 1408   Pulse 89 11/21/23 1412   Resp 16 11/21/23 1408   SpO2 95 % 11/21/23 1412   Vitals shown include unfiled device data.        Post Anesthesia Care and Evaluation    Patient location during evaluation: bedside  Patient participation: complete - patient participated  Level of consciousness: awake  Pain management: adequate    Airway patency: patent  Anesthetic complications: No anesthetic complications  PONV Status: controlled  Cardiovascular status: acceptable and stable  Respiratory status: acceptable

## 2023-11-21 NOTE — H&P
Pre Procedure History & Physical    Chief Complaint:   Diarrhea    Subjective     HPI:   Diarrhea and change in bowel habits    Past Medical History:   Past Medical History:   Diagnosis Date    Chiari I malformation     DDD (degenerative disc disease), cervical     DDD (degenerative disc disease), cervical     Disease of thyroid gland     Dizziness     Endometriosis     Fibromyalgia, primary 2022    GERD (gastroesophageal reflux disease)     Headache     Heart palpitations     Hiatal hernia     Migraine Aug 2019    Peripheral neuropathy 2019    Right greater than left arm/hands    PONV (postoperative nausea and vomiting)        Past Surgical History:  Past Surgical History:   Procedure Laterality Date    ANKLE SURGERY Right 2014    X2    CERVICAL CHIARI DECOMPRESSION POSTERIOR N/A 10/25/2016    Procedure: CERVICAL CHIARI DECOMPRESSION POSTERIOR, posterior fossa decompression, C1, C2 laminectomy and expansive duraplasty.;  Surgeon: Jim Santos MD;  Location: [facility] MAIN OR;  Service:      SECTION  ,    COLONOSCOPY      CRANIOTOMY  posterior fossa decompression with duraplasy    Oct 2016    DIAGNOSTIC LAPAROSCOPY  2008    X2    ENDOSCOPY  2018    ENDOSCOPY N/A 2020    Procedure: ESOPHAGOGASTRODUODENOSCOPY WITH BIOPSY;  Surgeon: Marc Ramirez Jr., MD;  Location: [facility] ENDOSCOPY;  Service: General;  Laterality: N/A;  PRE-GERD, H/O GASTRIC POLYPS  POST- GASTRIC POLYPS, HIATAL HERNIA, GASTRITIS    EYE SURGERY  2022    PRK    GASTRIC SLEEVE LAPAROSCOPIC N/A 2020    Procedure: GASTRIC SLEEVE LAPAROSCOPIC With PARAESOPHAGEAL Hernia Repair;  Surgeon: Marc Ramirez Jr., MD;  Location: [facility];  Service: Bariatric;  Laterality: N/A;    KNEE SURGERY      X6, before age 19    LAMINECTOMY  Oct 2016    C1 laminectomy    UPPER GASTROINTESTINAL ENDOSCOPY         Family History:  Family History   Problem Relation Age of Onset    Hypertension Mother     Obesity  Mother     Cardiomyopathy Father     Obesity Father     Hypertension Father     Heart disease Father     Sleep apnea Father     Obesity Brother     Sleep apnea Brother     Mental retardation Maternal Aunt     Parkinsonism Paternal Uncle     Obesity Maternal Grandmother     Stroke Maternal Grandmother     Hypertension Paternal Grandmother     Heart attack Paternal Grandmother     Heart disease Paternal Grandmother     Parkinsonism Paternal Grandmother     Hypertension Paternal Grandfather     Heart attack Paternal Grandfather     Heart disease Paternal Grandfather     Malig Hyperthermia Neg Hx     Colon cancer Neg Hx        Social History:   reports that she quit smoking about 16 years ago. Her smoking use included cigarettes. She has been exposed to tobacco smoke. She has never used smokeless tobacco. She reports current alcohol use of about 1.0 standard drink of alcohol per week. She reports that she does not use drugs.    Medications:   Medications Prior to Admission   Medication Sig Dispense Refill Last Dose    cyanocobalamin 1000 MCG/ML injection Inject 1ml into the muscle each week x 4 weeks then 1ml every 14 days x 2 weeks, then 1ml every 30 days 10 mL 2 11/20/2023    DULoxetine (CYMBALTA) 30 MG capsule Take 1 capsule by mouth Daily. 30 capsule 5 11/20/2023    gabapentin (NEURONTIN) 100 MG capsule Take 2 capsules by mouth 3 (Three) Times a Day for 30 days. 180 capsule 2 11/20/2023    levothyroxine (SYNTHROID, LEVOTHROID) 75 MCG tablet TAKE 1 TABLET DAILY AS DIRECTED. 90 tablet 1 11/20/2023    Norethin-Eth Estrad-Fe Biphas (LO LOESTRIN FE) 1 MG-10 MCG / 10 MCG tablet Take 1 tablet by mouth Daily. 84 tablet 3 11/20/2023    nystatin (MYCOSTATIN) 826149 UNIT/GM powder Apply 1 application topically to the appropriate area as directed 3 (Three) Times a Day. 60 g 3 11/20/2023    omeprazole (priLOSEC) 20 MG capsule Take 1 capsule by mouth Daily.   11/20/2023    Syringe/Needle, Disp, (Luer Lock Safety Syringes) 22G X  "1-1/2\" 3 ML misc use as directed once weekly 100 each 1 11/20/2023    Semaglutide-Weight Management (Wegovy) 2.4 MG/0.75ML solution auto-injector Inject 2.4 mg under the skin into the appropriate area as directed 1 (One) Time Per Week for 84 days. 9 mL 1 11/11/2023       Allergies:  Penicillins and Sulfa antibiotics        Objective     Weight 68 kg (149 lb 14.6 oz).    Physical Exam   Constitutional: Pt is oriented to person, place, and time and well-developed, well-nourished, and in no distress.   Mouth/Throat: Oropharynx is clear and moist.   Neck: Normal range of motion.   Cardiovascular: Normal rate, regular rhythm and normal heart sounds.    Pulmonary/Chest: Effort normal and breath sounds normal.   Abdominal: Soft. Nontender  Skin: Skin is warm and dry.   Psychiatric: Mood, memory, affect and judgment normal.     Assessment & Plan     Diagnosis:  Diarrhea    Anticipated Surgical Procedure:  Colonoscopy    The risks, benefits, and alternatives of this procedure have been discussed with the patient or the responsible party- the patient understands and agrees to proceed.            "

## 2023-11-24 LAB
CYTO UR: NORMAL
LAB AP CASE REPORT: NORMAL
LAB AP CLINICAL INFORMATION: NORMAL
PATH REPORT.FINAL DX SPEC: NORMAL
PATH REPORT.GROSS SPEC: NORMAL

## 2023-12-06 ENCOUNTER — TELEPHONE (OUTPATIENT)
Dept: GASTROENTEROLOGY | Facility: CLINIC | Age: 43
End: 2023-12-06
Payer: COMMERCIAL

## 2023-12-06 NOTE — TELEPHONE ENCOUNTER
----- Message from Scarlett Overton sent at 12/4/2023  3:50 PM EST -----    ----- Message -----  From: Carolee Rob APRN  Sent: 11/28/2023   2:30 PM EST  To: [facility] [place] [facility] Clinical Pool    I have reviewed the patients colonoscopy report. The report showed a normal colonoscopy with internal hemorrhoids.  Random colon biopsies are normal, negative for microscopic colitis. Repeat colonoscopy in 10 years. Please place in recall. Send letter.     Altered bowel habits likely related to medication. Please arrange follow up appointment if having persistent GI symptoms.

## 2023-12-06 NOTE — TELEPHONE ENCOUNTER
10yr recall and care gap placed.  Spoke with pt. Notified of results.  Pt stated she started taking probiotic and fiber. She would like to give it time to work. If GI symptoms persist, she will call us.  Voiced understanding.  Mailed post colon letter.  marianne

## 2023-12-11 NOTE — PROGRESS NOTES
"Chief Complaint  Consult (Excess skin removal)    Subjective               HPI   Valentina Anderson is a 43 y.o. female who presents to [facility] as a consult  for panniculitis.  Referred by SANDRA Julian RN.   Patient has had gastric sleeve. Starting weight is 297 lbs, current weight is 149 lbs, goal weight 149 lbs. Patient weight has been stable at current weight for 1 years. Patient has rash and moisture under abdominal fold. Treats rash with Nystatin powder, athletes foot spray, gold bond powder, lume deodorant. History of surgeries: gastric sleeve, lap gallbladder, 2 c sections, 2 laparoscopic's for endometriosis.   Patient has 2 children, is done having children. Patient has issues with activities of daily living. This issues are: finding clothes to fit right, itching and daily hygiene.             Allergies: Penicillins and Sulfa antibiotics  Allergies Reconciled.    Review of Systems  All system were reviewed and were negative, except the ones noted above.     Objective     /76 (BP Location: Left arm, Patient Position: Sitting, Cuff Size: Adult)   Pulse 92   Temp 98 °F (36.7 °C) (Temporal)   Ht 170.2 cm (67.01\")   Wt 67.8 kg (149 lb 6.4 oz)   SpO2 98%   BMI 23.39 kg/m²     Body mass index is 23.39 kg/m².    Physical Exam   Cardiovascular: Normal rate.     Pulmonary/Chest  Effort normal.      Patient is not tachycardic   Respiration is non elaborated.   Excess skin above and below umbilicus with grade 3 panniculus. Significant moisture, hyperpigmentation, and irritation over lower abdominal fold, has palpable hernia, and no open areas. Lipodystrophy of abdomen.      Result Review :       Procedures              Diagnoses and all orders for this visit:    1. Pannus, abdominal (Primary)              Plan:   - I had a long discussion with the patient regarding their panniculitis. I have explained that I believe they would benefit from panniculectomy " to address excess hanging skin. I explained that this would involve a low transverse incision and would only address the excess skin below the umbilicus. Patient verbalizes understands that this is not a cosmetic body contouring procedure.  Scar position was discussed, including long transverse incision. We discussed recovery time which would include no heavy lifting for 4-6 weeks, drains, and abdominal binder. We discussed risks including but not limited to bleeding, infection, pain, wound healing problems, and blood clot.     In case she opts for surgery, she would s with drains and abdominal binder Photographs were obtained today. I will write to the insurance company to obtain insurance coverage for this procedure.    69257 abdominoplasty   50901 umbilectomy  12553 umbilical hernia repair  60768 umbilicoplasty  07730 panniculectomy  15734 x2 fasciocutaneous flap trunk    88700- Smoking and tobacco use cessation counseling visit; intensive, greater than 10 minutes    Scribed by Kavya Linda, acting as a scribe for Liliana Gallego MD, 12/21/23 13:15 EST.  Liliana Gallego MD's signature on the note affirms that the note adequately documents the care provided.           Follow Up     Return RTC for pre op once surgery is scheduled.    Patient was given instructions and counseling regarding her condition. Please see specific information pulled into the AVS if appropriate.     Liliana Gallego MD  12/21/2023

## 2023-12-18 DIAGNOSIS — G89.29 CHRONIC NECK PAIN: ICD-10-CM

## 2023-12-18 DIAGNOSIS — G89.29 CHRONIC RIGHT-SIDED THORACIC BACK PAIN: ICD-10-CM

## 2023-12-18 DIAGNOSIS — M54.2 CHRONIC NECK PAIN: ICD-10-CM

## 2023-12-18 DIAGNOSIS — M54.6 CHRONIC RIGHT-SIDED THORACIC BACK PAIN: ICD-10-CM

## 2023-12-18 DIAGNOSIS — M54.81 OCCIPITAL NEURALGIA OF RIGHT SIDE: ICD-10-CM

## 2023-12-18 RX ORDER — GABAPENTIN 100 MG/1
200 CAPSULE ORAL 3 TIMES DAILY
Qty: 180 CAPSULE | Refills: 2 | Status: SHIPPED | OUTPATIENT
Start: 2023-12-18 | End: 2024-01-19

## 2023-12-20 ENCOUNTER — TELEPHONE (OUTPATIENT)
Dept: NEUROLOGY | Facility: CLINIC | Age: 43
End: 2023-12-20
Payer: COMMERCIAL

## 2023-12-20 NOTE — TELEPHONE ENCOUNTER
Spoke w/ pt, scheduled ONB for 1st available on Feb. 1. Pt states that she has missed work due to the pain & is willing to do what is needed to facilitate the appt. Informed pt that a message will be sent to [employer] any earlier dates are available.

## 2023-12-21 ENCOUNTER — OFFICE VISIT (OUTPATIENT)
Dept: PLASTIC SURGERY | Facility: CLINIC | Age: 43
End: 2023-12-21
Payer: COMMERCIAL

## 2023-12-21 VITALS
SYSTOLIC BLOOD PRESSURE: 107 MMHG | TEMPERATURE: 98 F | DIASTOLIC BLOOD PRESSURE: 76 MMHG | HEART RATE: 92 BPM | WEIGHT: 149.4 LBS | HEIGHT: 67 IN | OXYGEN SATURATION: 98 % | BODY MASS INDEX: 23.45 KG/M2

## 2023-12-21 DIAGNOSIS — E65 PANNUS, ABDOMINAL: Primary | ICD-10-CM

## 2023-12-22 ENCOUNTER — PREP FOR SURGERY (OUTPATIENT)
Dept: OTHER | Facility: HOSPITAL | Age: 43
End: 2023-12-22
Payer: COMMERCIAL

## 2023-12-22 DIAGNOSIS — E65 PANNUS, ABDOMINAL: Primary | ICD-10-CM

## 2023-12-22 RX ORDER — CEFAZOLIN SODIUM 2 G/100ML
2000 INJECTION, SOLUTION INTRAVENOUS ONCE
OUTPATIENT
Start: 2023-12-22 | End: 2023-12-22

## 2023-12-22 RX ORDER — ACETAMINOPHEN 500 MG
1000 TABLET ORAL ONCE
OUTPATIENT
Start: 2023-12-22 | End: 2023-12-22

## 2023-12-22 RX ORDER — SCOLOPAMINE TRANSDERMAL SYSTEM 1 MG/1
1 PATCH, EXTENDED RELEASE TRANSDERMAL CONTINUOUS
OUTPATIENT
Start: 2023-12-22 | End: 2023-12-25

## 2023-12-27 ENCOUNTER — PATIENT MESSAGE (OUTPATIENT)
Dept: PLASTIC SURGERY | Facility: CLINIC | Age: 43
End: 2023-12-27
Payer: COMMERCIAL

## 2023-12-27 ENCOUNTER — PATIENT ROUNDING (BHMG ONLY) (OUTPATIENT)
Dept: PLASTIC SURGERY | Facility: CLINIC | Age: 43
End: 2023-12-27
Payer: COMMERCIAL

## 2023-12-27 NOTE — PROGRESS NOTES
[employer] message has been sent to the patient for PATIENT ROUNDING with [facility].

## 2024-01-18 RX ORDER — NYSTATIN 100000 [USP'U]/G
1 POWDER TOPICAL 3 TIMES DAILY
Qty: 60 G | Refills: 3 | Status: SHIPPED | OUTPATIENT
Start: 2024-01-18

## 2024-01-23 ENCOUNTER — TRANSCRIBE ORDERS (OUTPATIENT)
Dept: URGENT CARE | Facility: CLINIC | Age: 44
End: 2024-01-23
Payer: COMMERCIAL

## 2024-01-23 DIAGNOSIS — Z98.84 H/O BARIATRIC SURGERY: ICD-10-CM

## 2024-01-23 DIAGNOSIS — E05.90 HYPERTHYROIDISM: ICD-10-CM

## 2024-01-23 DIAGNOSIS — E78.2 MIXED HYPERLIPIDEMIA: ICD-10-CM

## 2024-01-23 DIAGNOSIS — Z98.84 STATUS POST BARIATRIC SURGERY: ICD-10-CM

## 2024-01-23 DIAGNOSIS — E03.9 HYPOTHYROIDISM, UNSPECIFIED TYPE: Primary | ICD-10-CM

## 2024-01-23 DIAGNOSIS — R63.4 WEIGHT LOSS: ICD-10-CM

## 2024-01-23 DIAGNOSIS — E53.8 BIOTIN-(PROPIONYL-COA-CARBOXYLASE) LIGASE DEFICIENCY: ICD-10-CM

## 2024-01-23 DIAGNOSIS — E53.8 B12 DEFICIENCY: ICD-10-CM

## 2024-01-24 ENCOUNTER — TRANSCRIBE ORDERS (OUTPATIENT)
Dept: ADMINISTRATIVE | Facility: HOSPITAL | Age: 44
End: 2024-01-24
Payer: COMMERCIAL

## 2024-01-24 ENCOUNTER — LAB (OUTPATIENT)
Dept: LAB | Facility: HOSPITAL | Age: 44
End: 2024-01-24
Payer: COMMERCIAL

## 2024-01-24 DIAGNOSIS — Z98.84 H/O BARIATRIC SURGERY: ICD-10-CM

## 2024-01-24 DIAGNOSIS — E66.3 OVER WEIGHT: ICD-10-CM

## 2024-01-24 DIAGNOSIS — E03.9 HYPOTHYROIDISM, UNSPECIFIED TYPE: ICD-10-CM

## 2024-01-24 DIAGNOSIS — Z98.84 STATUS POST BARIATRIC SURGERY: ICD-10-CM

## 2024-01-24 DIAGNOSIS — E66.3 OVER WEIGHT: Primary | ICD-10-CM

## 2024-01-24 DIAGNOSIS — E78.2 MIXED HYPERLIPIDEMIA: ICD-10-CM

## 2024-01-24 DIAGNOSIS — E53.8 BIOTIN-(PROPIONYL-COA-CARBOXYLASE) LIGASE DEFICIENCY: ICD-10-CM

## 2024-01-24 DIAGNOSIS — E05.90 HYPERTHYROIDISM: ICD-10-CM

## 2024-01-24 DIAGNOSIS — R63.4 WEIGHT LOSS: ICD-10-CM

## 2024-01-24 DIAGNOSIS — E53.8 B12 DEFICIENCY: ICD-10-CM

## 2024-01-24 LAB
25(OH)D3 SERPL-MCNC: 45 NG/ML (ref 30–100)
ALBUMIN SERPL-MCNC: 4.1 G/DL (ref 3.5–5.2)
ALBUMIN/GLOB SERPL: 1.5 G/DL
ALP SERPL-CCNC: 45 U/L (ref 39–117)
ALT SERPL W P-5'-P-CCNC: 10 U/L (ref 1–33)
ANION GAP SERPL CALCULATED.3IONS-SCNC: 10.4 MMOL/L (ref 5–15)
AST SERPL-CCNC: 20 U/L (ref 1–32)
BILIRUB SERPL-MCNC: 0.5 MG/DL (ref 0–1.2)
BUN SERPL-MCNC: 9 MG/DL (ref 6–20)
BUN/CREAT SERPL: 11.3 (ref 7–25)
CALCIUM SPEC-SCNC: 8.9 MG/DL (ref 8.6–10.5)
CHLORIDE SERPL-SCNC: 106 MMOL/L (ref 98–107)
CHOLEST SERPL-MCNC: 127 MG/DL (ref 0–200)
CO2 SERPL-SCNC: 23.6 MMOL/L (ref 22–29)
CREAT SERPL-MCNC: 0.8 MG/DL (ref 0.57–1)
EGFRCR SERPLBLD CKD-EPI 2021: 92.7 ML/MIN/1.73
FOLATE SERPL-MCNC: 9.89 NG/ML (ref 4.78–24.2)
GLOBULIN UR ELPH-MCNC: 2.8 GM/DL
GLUCOSE SERPL-MCNC: 81 MG/DL (ref 65–99)
HBA1C MFR BLD: 5 % (ref 4.8–5.6)
HDLC SERPL-MCNC: 56 MG/DL (ref 40–60)
LDLC SERPL CALC-MCNC: 59 MG/DL (ref 0–100)
LDLC/HDLC SERPL: 1.07 {RATIO}
POTASSIUM SERPL-SCNC: 3.5 MMOL/L (ref 3.5–5.2)
PROT SERPL-MCNC: 6.9 G/DL (ref 6–8.5)
SODIUM SERPL-SCNC: 140 MMOL/L (ref 136–145)
TRIGL SERPL-MCNC: 55 MG/DL (ref 0–150)
TSH SERPL DL<=0.05 MIU/L-ACNC: 1.96 UIU/ML (ref 0.27–4.2)
VIT B12 BLD-MCNC: 442 PG/ML (ref 211–946)
VLDLC SERPL-MCNC: 12 MG/DL (ref 5–40)

## 2024-01-24 PROCEDURE — 83550 IRON BINDING TEST: CPT

## 2024-01-24 PROCEDURE — 82607 VITAMIN B-12: CPT

## 2024-01-24 PROCEDURE — 83540 ASSAY OF IRON: CPT

## 2024-01-24 PROCEDURE — 80050 GENERAL HEALTH PANEL: CPT

## 2024-01-24 PROCEDURE — 82306 VITAMIN D 25 HYDROXY: CPT

## 2024-01-24 PROCEDURE — 36415 COLL VENOUS BLD VENIPUNCTURE: CPT

## 2024-01-24 PROCEDURE — 82746 ASSAY OF FOLIC ACID SERUM: CPT

## 2024-01-24 PROCEDURE — 85045 AUTOMATED RETICULOCYTE COUNT: CPT

## 2024-01-24 PROCEDURE — 82728 ASSAY OF FERRITIN: CPT

## 2024-01-24 PROCEDURE — 80061 LIPID PANEL: CPT

## 2024-01-24 PROCEDURE — 83036 HEMOGLOBIN GLYCOSYLATED A1C: CPT

## 2024-01-25 LAB
BASOPHILS # BLD AUTO: 0 X10E3/UL (ref 0–0.2)
BASOPHILS NFR BLD AUTO: 1 %
EOSINOPHIL # BLD AUTO: 0.1 X10E3/UL (ref 0–0.4)
EOSINOPHIL NFR BLD AUTO: 4 %
ERYTHROCYTE [DISTWIDTH] IN BLOOD BY AUTOMATED COUNT: 13.2 % (ref 11.7–15.4)
FERRITIN SERPL-MCNC: 72 NG/ML (ref 15–150)
FOLATE SERPL-MCNC: 8.3 NG/ML
HCT VFR BLD AUTO: 41.3 % (ref 34–46.6)
HGB BLD-MCNC: 13.9 G/DL (ref 11.1–15.9)
IMM GRANULOCYTES # BLD AUTO: 0 X10E3/UL (ref 0–0.1)
IMM GRANULOCYTES NFR BLD AUTO: 0 %
IRON SATN MFR SERPL: 47 % (ref 15–55)
IRON SERPL-MCNC: 142 UG/DL (ref 27–159)
LYMPHOCYTES # BLD AUTO: 1.7 X10E3/UL (ref 0.7–3.1)
LYMPHOCYTES NFR BLD AUTO: 45 %
MCH RBC QN AUTO: 30.4 PG (ref 26.6–33)
MCHC RBC AUTO-ENTMCNC: 33.7 G/DL (ref 31.5–35.7)
MCV RBC AUTO: 90 FL (ref 79–97)
MONOCYTES # BLD AUTO: 0.3 X10E3/UL (ref 0.1–0.9)
MONOCYTES NFR BLD AUTO: 7 %
NEUTROPHILS # BLD AUTO: 1.6 X10E3/UL (ref 1.4–7)
NEUTROPHILS NFR BLD AUTO: 43 %
PLATELET # BLD AUTO: 228 X10E3/UL (ref 150–450)
RBC # BLD AUTO: 4.57 X10E6/UL (ref 3.77–5.28)
RETICS/RBC NFR AUTO: 0.8 % (ref 0.6–2.6)
TIBC SERPL-MCNC: 301 UG/DL (ref 250–450)
UIBC SERPL-MCNC: 159 UG/DL (ref 131–425)
VIT B12 SERPL-MCNC: 433 PG/ML (ref 232–1245)
WBC # BLD AUTO: 3.7 X10E3/UL (ref 3.4–10.8)

## 2024-01-31 ENCOUNTER — OFFICE VISIT (OUTPATIENT)
Dept: BARIATRICS/WEIGHT MGMT | Facility: CLINIC | Age: 44
End: 2024-01-31
Payer: COMMERCIAL

## 2024-01-31 VITALS
WEIGHT: 156.5 LBS | HEART RATE: 76 BPM | HEIGHT: 67 IN | SYSTOLIC BLOOD PRESSURE: 108 MMHG | DIASTOLIC BLOOD PRESSURE: 64 MMHG | TEMPERATURE: 98.2 F | BODY MASS INDEX: 24.56 KG/M2

## 2024-01-31 DIAGNOSIS — Z98.84 S/P LAPAROSCOPIC SLEEVE GASTRECTOMY: ICD-10-CM

## 2024-01-31 DIAGNOSIS — F41.9 ANXIETY AND DEPRESSION: ICD-10-CM

## 2024-01-31 DIAGNOSIS — E55.9 VITAMIN D DEFICIENCY: ICD-10-CM

## 2024-01-31 DIAGNOSIS — F32.A ANXIETY AND DEPRESSION: ICD-10-CM

## 2024-01-31 DIAGNOSIS — M25.50 MULTIPLE JOINT PAIN: ICD-10-CM

## 2024-01-31 DIAGNOSIS — E28.2 PCOS (POLYCYSTIC OVARIAN SYNDROME): ICD-10-CM

## 2024-01-31 PROBLEM — B37.2 INTERTRIGINOUS CANDIDIASIS: Status: RESOLVED | Noted: 2023-01-16 | Resolved: 2024-01-31

## 2024-01-31 NOTE — PROGRESS NOTES
[facility]  [address] [facility] [address]  [facility] [id number] [phone]  [address] [id number] [phone]  Dept: [phone]  1/31/2024      Valenitna Anderson.  [id number]  [id number]  1980  female      Chief Complaint   Patient presents with    Follow-up     Follow up sleeve       [facility] Post-Op Bariatric Surgery:   Valentina Anderson is status post Laparoscopic Sleeve/PEH procedure, performed on 11/4/2020     HPI:     Today's weight is 71 kg (156 lb 8 oz) pounds, today's BMI is Body mass index is 24.51 kg/m²., she has a gain of 7pounds since the last visit and her weight loss since surgery is 147 pounds. The patient reports a decreased portion size and loss of appetite.    Valentina Anderson denies nausea, vomiting, reflux and reports that she is getting all of  her protein from food over shakes. She is eating every 4-5 hours     Diet and Exercise: Diet history reviewed and discussed with the patient. Weight loss/gains to date discussed with the patient. The patient states they are eating 60 grams of protein per day. She reports eating 3 meals per day, a typical portion size of 1/2 cup, eating 1-2 snacks per day, drinking 100 or more 8-oz. glasses of water per day, no carbonated beverage consumption and exercising regularly- walking most days.     Supplements: OTC MTV with iron     Review of Systems   Constitutional:  Positive for appetite change. Negative for fatigue and unexpected weight change.   HENT: Negative.     Eyes: Negative.    Respiratory: Negative.     Cardiovascular: Negative.  Negative for leg swelling.   Gastrointestinal:  Negative for abdominal distention, abdominal pain, constipation, diarrhea, nausea and vomiting.   Genitourinary:  Negative for difficulty urinating, frequency and urgency.   Musculoskeletal:  Negative for back pain.   Skin: Negative.    Psychiatric/Behavioral: Negative.     All other systems  reviewed and are negative.      Patient Active Problem List   Diagnosis    Chiari malformation type I    Dizziness    New daily persistent headache    History of brain surgery    Degeneration of intervertebral disc at C6-C7 level    Bilateral occipital neuralgia    Chronic fatigue    Multiple joint pain    Anxiety and depression    Hypothyroid    PCOS (polycystic ovarian syndrome)    Endometriosis    Multiple gastric polyps    S/P laparoscopic sleeve gastrectomy    Dietary counseling    Abnormal electrocardiogram    Vitamin D deficiency    Body mass index (BMI) of 24.0 to 24.9 in adult    Altered bowel habits    Constipation    Diarrhea    Pannus, abdominal       Past Medical History:   Diagnosis Date    Chiari I malformation     DDD (degenerative disc disease), cervical     DDD (degenerative disc disease), cervical     Disease of thyroid gland     Dizziness     Endometriosis     Fibromyalgia, primary Nov 2022    GERD (gastroesophageal reflux disease)     Headache     Heart palpitations     Hiatal hernia     Migraine Aug 2019    Peripheral neuropathy July 2019    Right greater than left arm/hands    PONV (postoperative nausea and vomiting)        The following portions of the patient's history were reviewed and updated as appropriate: allergies, current medications, past family history, past medical history, past social history, past surgical history, and problem list.    Vitals:    01/31/24 1557   BP: 108/64   Pulse: 76   Temp: 98.2 °F (36.8 °C)       Physical Exam  Vitals and nursing note reviewed.   Constitutional:       Appearance: She is well-developed.   Neck:      Thyroid: No thyromegaly.   Cardiovascular:      Rate and Rhythm: Normal rate.   Pulmonary:      Effort: Pulmonary effort is normal. No respiratory distress.   Abdominal:      Palpations: Abdomen is soft.   Musculoskeletal:         General: No tenderness.   Skin:     General: Skin is warm and dry.   Neurological:      Mental Status: She is alert.    Psychiatric:         Behavior: Behavior normal.         Assessment:   Post-op, the patient is doing well.     Encounter Diagnoses   Name Primary?    Body mass index (BMI) of 24.0 to 24.9 in adult Yes    S/P laparoscopic sleeve gastrectomy     Anxiety and depression     Vitamin D deficiency     PCOS (polycystic ovarian syndrome)     Multiple joint pain        Plan:   Patient continues to tolerate wegovy well and has lost and maintained over 58lb on the drug. Will continue wegovy to support healthy weight.  Encouraged patient to be sure to get plenty of lean protein per day through small frequent meals all with a protein source.   Activity restrictions: none.   Recommended patient be sure to get at least 70 grams of protein per day by eating small, frequent meals all with high lean protein choices. Be sure to limit/cut back on daily carbohydrate intake. Discussed with the patient the recommended amount of water per day to intake- half of body weight in ounces. Reviewed vitamin requirements. Be sure to do routine exercise, 150 minutes per week minimum, including both cardio and strength training.     Instructions / Recommendations: dietary counseling recommended, recommended a daily protein intake of  grams, vitamin supplement(s) recommended, recommended exercising at least 150 minutes per week, behavior modifications recommended and instructed to call the office for concerns, questions, or problems.     The patient was instructed to follow up in 3 months .     Total time spent during this encounter today was 35 minutes

## 2024-02-01 ENCOUNTER — PROCEDURE VISIT (OUTPATIENT)
Dept: NEUROLOGY | Facility: CLINIC | Age: 44
End: 2024-02-01
Payer: COMMERCIAL

## 2024-02-01 DIAGNOSIS — M54.81 OCCIPITAL NEURALGIA OF RIGHT SIDE: Primary | ICD-10-CM

## 2024-02-01 DIAGNOSIS — M54.81 BILATERAL OCCIPITAL NEURALGIA: ICD-10-CM

## 2024-02-01 RX ORDER — SEMAGLUTIDE 2.4 MG/.75ML
2.4 INJECTION, SOLUTION SUBCUTANEOUS WEEKLY
Qty: 9 ML | Refills: 1 | Status: SHIPPED | OUTPATIENT
Start: 2024-02-01 | End: 2024-04-25

## 2024-02-01 RX ORDER — METHYLPREDNISOLONE ACETATE 40 MG/ML
40 INJECTION, SUSPENSION INTRA-ARTICULAR; INTRALESIONAL; INTRAMUSCULAR; SOFT TISSUE ONCE
Status: COMPLETED | OUTPATIENT
Start: 2024-02-01 | End: 2024-02-01

## 2024-02-01 RX ORDER — LIDOCAINE HYDROCHLORIDE 20 MG/ML
1 INJECTION, SOLUTION INFILTRATION; PERINEURAL ONCE
Status: COMPLETED | OUTPATIENT
Start: 2024-02-01 | End: 2024-02-01

## 2024-02-01 RX ADMIN — LIDOCAINE HYDROCHLORIDE 1 ML: 20 INJECTION, SOLUTION INFILTRATION; PERINEURAL at 13:16

## 2024-02-01 RX ADMIN — METHYLPREDNISOLONE ACETATE 40 MG: 40 INJECTION, SUSPENSION INTRA-ARTICULAR; INTRALESIONAL; INTRAMUSCULAR; SOFT TISSUE at 13:19

## 2024-02-01 NOTE — PROGRESS NOTES
"Procedure   Nerve Block    Date/Time: 2/1/2024 12:50 PM    Performed by: Arnulfo Judd II, MD  Authorized by: Arnulfo Judd II, MD  Consent: Verbal consent obtained. Written consent obtained.  Risks and benefits: risks, benefits and alternatives were discussed  Consent given by: patient  Patient understanding: patient states understanding of the procedure being performed  Patient consent: the patient's understanding of the procedure matches consent given  Procedure consent: procedure consent matches procedure scheduled  Required items: required blood products, implants, devices, and special equipment available  Patient identity confirmed: verbally with patient and provided demographic data  Time out: Immediately prior to procedure a \"time out\" was called to verify the correct patient, procedure, equipment, support staff and site/side marked as required.  Indications comments: occipital neuralgia  Body area: head  Nerve: greater occipital  Laterality: right    Sedation:  Patient sedated: no    Patient position: sitting  Needle size: 27 G  Location technique: anatomical landmarks  Patient tolerance: Patient tolerated the procedure well with no immediate complications  Comments: 0.5 cc of both Depo-Medrol and 2% lidocaine were drawn into 1, 3 cc syringe.  The full 1 cc mixture was injected at the site of the greater occipital nerve, right          Bilateral ONB     "

## 2024-06-07 ENCOUNTER — OFFICE VISIT (OUTPATIENT)
Dept: ORTHOPEDIC SURGERY | Facility: CLINIC | Age: 44
End: 2024-06-07
Payer: COMMERCIAL

## 2024-06-07 VITALS
WEIGHT: 153 LBS | HEIGHT: 67 IN | OXYGEN SATURATION: 98 % | DIASTOLIC BLOOD PRESSURE: 78 MMHG | HEART RATE: 81 BPM | SYSTOLIC BLOOD PRESSURE: 118 MMHG | BODY MASS INDEX: 24.01 KG/M2

## 2024-06-07 DIAGNOSIS — M25.562 LEFT KNEE PAIN, UNSPECIFIED CHRONICITY: Primary | ICD-10-CM

## 2024-06-07 RX ORDER — MELOXICAM 15 MG/1
15 TABLET ORAL DAILY
Qty: 30 TABLET | Refills: 2 | Status: SHIPPED | OUTPATIENT
Start: 2024-06-07

## 2024-06-07 NOTE — PROGRESS NOTES
"Chief Complaint  Pain of the Left Knee     Suresh Anderson presents to [facility] for initial evaluation of the left knee. She has had 6 surgeries in the past of the left knee. Her last surgery was in .  She has had pain for 6 months on the medial aspect of the knee and doesn't feel it is in the joint.  She has pain with sleeping and taking ibuprofen and that is not cutting it. The pain has worsened the last couple of months.      Allergies   Allergen Reactions    Penicillins Hives and Rash    Sulfa Antibiotics Hives and Rash        Social History     Socioeconomic History    Marital status:     Number of children: 2    Years of education: College   Tobacco Use    Smoking status: Former     Current packs/day: 0.00     Types: Cigarettes     Start date: 2002     Quit date: 2007     Years since quittin.9     Passive exposure: Past    Smokeless tobacco: Never    Tobacco comments:     QUIT    Vaping Use    Vaping status: Never Used   Substance and Sexual Activity    Alcohol use: Yes     Alcohol/week: 1.0 standard drink of alcohol     Types: 1 Cans of beer per week     Comment: social drinker    Drug use: No    Sexual activity: Yes     Partners: Male     Birth control/protection: OCP        I reviewed the patient's chief complaint, history of present illness, review of systems, past medical history, surgical history, family history, social history, medications, and allergy list.     Review of Systems     Constitutional: Denies fevers, chills, weight loss  Cardiovascular: Denies chest pain, shortness of breath  Skin: Denies rashes, acute skin changes  Neurologic: Denies headache, loss of consciousness      Vital Signs:   /78   Pulse 81   Ht 170.2 cm (67\")   Wt 69.4 kg (153 lb)   SpO2 98%   BMI 23.96 kg/m²          Physical Exam  General: Alert. No acute distress    Ortho Exam        LEFT KNEE Flexion 139. Extension 9. Stable to " varus/valgus stress. Stable to anterior/posterior drawer. Neurovascularly intact. Negative Dina. Negative Lachman. Positive EHL, FHL, HS and TA. Sensation intact to light touch all 5 nerves of the foot. Ambulates with Non-antalgic gait. Patella is well tracking. Calf supple, non-tender. Positive tenderness to the medial joint line. Negative tenderness to the lateral joint line. Positive Crepitus. Good strength to hamstrings, quadriceps, dorsiflexors, and plantar flexors.  Knee Extensor Mechanism intact  Well healed scar.        Procedures      Imaging Results (Most Recent)       Procedure Component Value Units Date/Time    XR Knee 3 View Left [[id number]] Resulted: 06/07/24 0805     Updated: 06/07/24 0805             Result Review :     X-Ray Report:  Left knee X-Ray  Indication: Evaluation of the left knee  AP/Lateral and Trail view(s)  Findings: Mild degenerative changes with joint space narrowing. Moderate degenerative changes to the patella femoral line.  No acute fracture or dislocation.   Prior studies available for comparison: no             Assessment and Plan     Diagnoses and all orders for this visit:    1. Left knee pain, unspecified chronicity (Primary)  -     XR Knee 3 View Left        Discussed the treatment plan with the patient. I reviewed the X-rays that were obtained today with the patient.     Prescribed anti inflammatory.  HEP exercises.       Call or return if worsening symptoms.    Follow Up     PRN      Patient was given instructions and counseling regarding her condition or for health maintenance advice. Please see specific information pulled into the AVS if appropriate.     Scribed for Donavon Jackson MD by Ngoc Winchester MA.  06/07/24   08:02 EDT    I have personally performed the services described in this document as scribed by the above individual and it is both accurate and complete. Donavon Jackson MD 06/07/24

## 2024-06-21 NOTE — H&P (VIEW-ONLY)
"Pre-op Exam (Pre op)            History of Present Illness  Valentina Anderson is a 44 y.o. female who presents to [facility] for Pre-Operative Examination for PANNICULECTOMY - jesi immanuel aman and use of spy 7/12/24.    Pt presents today for pre op.    Subjective        Penicillins and Sulfa antibiotics  Allergies Reconciled.    Review of Systems   All review of system has been reviewed and it  is negative except the ones note above.     Objective     /78 (BP Location: Left arm, Patient Position: Sitting, Cuff Size: Adult)   Pulse 80   Ht 170.2 cm (67.01\")   Wt 71.7 kg (158 lb)   SpO2 98%   BMI 24.74 kg/m²     Body mass index is 24.74 kg/m².    Physical Exam  Pannus over the pubic bone     Result Review :                Assessment and Plan      Diagnoses and all orders for this visit:    1. Pre-operative examination (Primary)  -     Nicotine Screen, Urine - Urine, Clean Catch; Future  -     clindamycin (Cleocin) 150 MG capsule; Take 1 capsule by mouth 4 (Four) Times a Day for 10 days.  Dispense: 40 capsule; Refill: 0  -     Chlorhexidine Gluconate 4 % solution; Apply 15.7667 Applications topically to the appropriate area as directed Daily. Shower daily for 7 days prior to surgery  Dispense: 473 mL; Refill: 0  -     naproxen (NAPROSYN) 250 MG tablet; Take 1 tablet by mouth 2 (Two) Times a Day.  Dispense: 12 tablet; Refill: 0  -     gabapentin (Neurontin) 300 MG capsule; Take 1 capsule by mouth 3 (Three) Times a Day for 18 doses.  Dispense: 18 capsule; Refill: 0  -     acetaminophen (TYLENOL) 500 MG tablet; Take 2 tablets by mouth Every 6 (Six) Hours As Needed for Moderate Pain for up to 18 doses.  Dispense: 18 tablet; Refill: 0  -     ondansetron (Zofran) 4 MG tablet; Take 1 tablet by mouth Daily As Needed for Nausea or Vomiting for up to 18 doses.  Dispense: 18 tablet; Refill: 0    2. Pannus, abdominal        Plan:    We have performed a peer to peer on this " patient. Insurance carries stated her pannus was not below the pubic bone level. I explained that it did and that what was seen was the excess of mons that was also ptotic. Her pannus cover entirely her pubic bone. We have repeated the pictures and I have marked with a white indicator where her actually pubic bone is located. Hopefully insurance will approve since this patient is a perfect candidate for this surgery.     Given these options, the patient has verbally expressed an understanding of the risks of surgery and finds these risks acceptable. We will proceed with surgery as soon as possible.    Medications sent to pharmacy  Urine nicotine ordered      Scribed by Kavya Linda, acting as a scribe for Liliana Gallego MD, 06/25/24 08:09 EDT.  Liliana Gallego MD's signature on the note affirms that the note adequately documents the care provided.          Follow Up     Return RTC on 7/18/24 for 1 week post op.    Patient was given instructions and counseling regarding her condition. Please see specific information pulled into the AVS if appropriate.     Liliana Gallego MD  06/25/2024

## 2024-06-21 NOTE — PROGRESS NOTES
"Pre-op Exam (Pre op)            History of Present Illness  Valentina Anderson is a 44 y.o. female who presents to [facility] for Pre-Operative Examination for PANNICULECTOMY - jesi immanuel aman and use of spy 7/12/24.    Pt presents today for pre op.    Subjective        Penicillins and Sulfa antibiotics  Allergies Reconciled.    Review of Systems   All review of system has been reviewed and it  is negative except the ones note above.     Objective     /78 (BP Location: Left arm, Patient Position: Sitting, Cuff Size: Adult)   Pulse 80   Ht 170.2 cm (67.01\")   Wt 71.7 kg (158 lb)   SpO2 98%   BMI 24.74 kg/m²     Body mass index is 24.74 kg/m².    Physical Exam  Pannus over the pubic bone     Result Review :                Assessment and Plan      Diagnoses and all orders for this visit:    1. Pre-operative examination (Primary)  -     Nicotine Screen, Urine - Urine, Clean Catch; Future  -     clindamycin (Cleocin) 150 MG capsule; Take 1 capsule by mouth 4 (Four) Times a Day for 10 days.  Dispense: 40 capsule; Refill: 0  -     Chlorhexidine Gluconate 4 % solution; Apply 15.7667 Applications topically to the appropriate area as directed Daily. Shower daily for 7 days prior to surgery  Dispense: 473 mL; Refill: 0  -     naproxen (NAPROSYN) 250 MG tablet; Take 1 tablet by mouth 2 (Two) Times a Day.  Dispense: 12 tablet; Refill: 0  -     gabapentin (Neurontin) 300 MG capsule; Take 1 capsule by mouth 3 (Three) Times a Day for 18 doses.  Dispense: 18 capsule; Refill: 0  -     acetaminophen (TYLENOL) 500 MG tablet; Take 2 tablets by mouth Every 6 (Six) Hours As Needed for Moderate Pain for up to 18 doses.  Dispense: 18 tablet; Refill: 0  -     ondansetron (Zofran) 4 MG tablet; Take 1 tablet by mouth Daily As Needed for Nausea or Vomiting for up to 18 doses.  Dispense: 18 tablet; Refill: 0    2. Pannus, abdominal        Plan:    We have performed a peer to peer on this " patient. Insurance carries stated her pannus was not below the pubic bone level. I explained that it did and that what was seen was the excess of mons that was also ptotic. Her pannus cover entirely her pubic bone. We have repeated the pictures and I have marked with a white indicator where her actually pubic bone is located. Hopefully insurance will approve since this patient is a perfect candidate for this surgery.     Given these options, the patient has verbally expressed an understanding of the risks of surgery and finds these risks acceptable. We will proceed with surgery as soon as possible.    Medications sent to pharmacy  Urine nicotine ordered      Scribed by Kavya Linda, acting as a scribe for Liliana Gallego MD, 06/25/24 08:09 EDT.  Liliana Gallego MD's signature on the note affirms that the note adequately documents the care provided.          Follow Up     Return RTC on 7/18/24 for 1 week post op.    Patient was given instructions and counseling regarding her condition. Please see specific information pulled into the AVS if appropriate.     Liliana Gallego MD  06/25/2024

## 2024-06-25 ENCOUNTER — OFFICE VISIT (OUTPATIENT)
Dept: PLASTIC SURGERY | Facility: CLINIC | Age: 44
End: 2024-06-25
Payer: COMMERCIAL

## 2024-06-25 ENCOUNTER — TELEPHONE (OUTPATIENT)
Dept: PLASTIC SURGERY | Facility: CLINIC | Age: 44
End: 2024-06-25

## 2024-06-25 VITALS
WEIGHT: 158 LBS | BODY MASS INDEX: 24.8 KG/M2 | HEIGHT: 67 IN | SYSTOLIC BLOOD PRESSURE: 133 MMHG | DIASTOLIC BLOOD PRESSURE: 78 MMHG | OXYGEN SATURATION: 98 % | HEART RATE: 80 BPM

## 2024-06-25 DIAGNOSIS — Z01.818 PRE-OPERATIVE EXAMINATION: Primary | ICD-10-CM

## 2024-06-25 DIAGNOSIS — E65 PANNUS, ABDOMINAL: ICD-10-CM

## 2024-06-25 PROCEDURE — 99212 OFFICE O/P EST SF 10 MIN: CPT | Performed by: SURGERY

## 2024-06-25 RX ORDER — CLINDAMYCIN HYDROCHLORIDE 150 MG/1
150 CAPSULE ORAL 4 TIMES DAILY
Qty: 40 CAPSULE | Refills: 0 | Status: SHIPPED | OUTPATIENT
Start: 2024-06-25 | End: 2024-07-06

## 2024-06-25 RX ORDER — ACETAMINOPHEN 500 MG
1000 TABLET ORAL EVERY 6 HOURS PRN
Qty: 18 TABLET | Refills: 0 | Status: SHIPPED | OUTPATIENT
Start: 2024-06-25

## 2024-06-25 RX ORDER — NAPROXEN 250 MG/1
250 TABLET ORAL 2 TIMES DAILY
Qty: 12 TABLET | Refills: 0 | Status: SHIPPED | OUTPATIENT
Start: 2024-06-25

## 2024-06-25 RX ORDER — GABAPENTIN 300 MG/1
300 CAPSULE ORAL 3 TIMES DAILY
Qty: 18 CAPSULE | Refills: 0 | Status: SHIPPED | OUTPATIENT
Start: 2024-06-25 | End: 2024-07-02

## 2024-06-25 RX ORDER — CHLORHEXIDINE GLUCONATE 40 MG/ML
473 SOLUTION TOPICAL DAILY
Qty: 473 ML | Refills: 0 | Status: SHIPPED | OUTPATIENT
Start: 2024-06-25

## 2024-06-25 RX ORDER — ONDANSETRON 4 MG/1
4 TABLET, FILM COATED ORAL DAILY PRN
Qty: 18 TABLET | Refills: 0 | Status: SHIPPED | OUTPATIENT
Start: 2024-06-25

## 2024-06-26 ENCOUNTER — LAB (OUTPATIENT)
Dept: LAB | Facility: HOSPITAL | Age: 44
End: 2024-06-26
Payer: COMMERCIAL

## 2024-06-26 DIAGNOSIS — Z01.818 PRE-OPERATIVE EXAMINATION: ICD-10-CM

## 2024-06-26 LAB — COTININE UR-MCNC: NEGATIVE NG/ML

## 2024-06-26 PROCEDURE — G0480 DRUG TEST DEF 1-7 CLASSES: HCPCS

## 2024-07-08 RX ORDER — SEMAGLUTIDE 2.4 MG/.75ML
2.4 INJECTION, SOLUTION SUBCUTANEOUS WEEKLY
Qty: 9 ML | Refills: 1 | Status: SHIPPED | OUTPATIENT
Start: 2024-07-08 | End: 2024-09-30

## 2024-07-09 ENCOUNTER — TRANSCRIBE ORDERS (OUTPATIENT)
Dept: ADMINISTRATIVE | Facility: HOSPITAL | Age: 44
End: 2024-07-09
Payer: COMMERCIAL

## 2024-07-09 ENCOUNTER — LAB (OUTPATIENT)
Dept: LAB | Facility: HOSPITAL | Age: 44
End: 2024-07-09
Payer: COMMERCIAL

## 2024-07-09 DIAGNOSIS — E66.3 OVERWEIGHT: ICD-10-CM

## 2024-07-09 DIAGNOSIS — E66.3 OVERWEIGHT: Primary | ICD-10-CM

## 2024-07-09 LAB
CHOLEST SERPL-MCNC: 113 MG/DL (ref 0–200)
HBA1C MFR BLD: 4.6 % (ref 4.8–5.6)
HDLC SERPL-MCNC: 52 MG/DL (ref 40–60)
LDLC SERPL CALC-MCNC: 48 MG/DL (ref 0–100)
LDLC/HDLC SERPL: 0.95 {RATIO}
TRIGL SERPL-MCNC: 58 MG/DL (ref 0–150)
VLDLC SERPL-MCNC: 13 MG/DL (ref 5–40)

## 2024-07-09 PROCEDURE — 83036 HEMOGLOBIN GLYCOSYLATED A1C: CPT

## 2024-07-09 PROCEDURE — 36415 COLL VENOUS BLD VENIPUNCTURE: CPT

## 2024-07-09 PROCEDURE — 80061 LIPID PANEL: CPT

## 2024-07-11 ENCOUNTER — ANESTHESIA EVENT (OUTPATIENT)
Dept: PERIOP | Facility: HOSPITAL | Age: 44
End: 2024-07-11
Payer: COMMERCIAL

## 2024-07-12 ENCOUNTER — HOSPITAL ENCOUNTER (OUTPATIENT)
Facility: HOSPITAL | Age: 44
Setting detail: HOSPITAL OUTPATIENT SURGERY
Discharge: HOME | End: 2024-07-12
Attending: SURGERY | Admitting: SURGERY
Payer: COMMERCIAL

## 2024-07-12 ENCOUNTER — ANESTHESIA (OUTPATIENT)
Dept: PERIOP | Facility: HOSPITAL | Age: 44
End: 2024-07-12
Payer: COMMERCIAL

## 2024-07-12 VITALS
HEART RATE: 69 BPM | OXYGEN SATURATION: 99 % | SYSTOLIC BLOOD PRESSURE: 122 MMHG | DIASTOLIC BLOOD PRESSURE: 72 MMHG | RESPIRATION RATE: 15 BRPM | HEIGHT: 67 IN | BODY MASS INDEX: 24.78 KG/M2 | WEIGHT: 157.85 LBS | TEMPERATURE: 98.9 F

## 2024-07-12 LAB
B-HCG UR QL: NEGATIVE
COTININE UR-MCNC: NEGATIVE NG/ML

## 2024-07-12 PROCEDURE — 25010000002 SUGAMMADEX 200 MG/2ML SOLUTION: Performed by: NURSE ANESTHETIST, CERTIFIED REGISTERED

## 2024-07-12 PROCEDURE — 25010000002 BUPIVACAINE (PF) 0.5 % SOLUTION: Performed by: SURGERY

## 2024-07-12 PROCEDURE — 25010000002 MIDAZOLAM PER 1MG: Performed by: ANESTHESIOLOGY

## 2024-07-12 PROCEDURE — 25010000002 DEXAMETHASONE PER 1 MG: Performed by: SURGERY

## 2024-07-12 PROCEDURE — G0480 DRUG TEST DEF 1-7 CLASSES: HCPCS | Performed by: SURGERY

## 2024-07-12 PROCEDURE — 25010000002 CLINDAMYCIN 900 MG/50ML SOLUTION: Performed by: SURGERY

## 2024-07-12 PROCEDURE — 25010000002 DEXAMETHASONE PER 1 MG: Performed by: NURSE ANESTHETIST, CERTIFIED REGISTERED

## 2024-07-12 PROCEDURE — 25010000002 FENTANYL CITRATE (PF) 50 MCG/ML SOLUTION: Performed by: NURSE ANESTHETIST, CERTIFIED REGISTERED

## 2024-07-12 PROCEDURE — 25810000003 LACTATED RINGERS PER 1000 ML: Performed by: ANESTHESIOLOGY

## 2024-07-12 PROCEDURE — 81025 URINE PREGNANCY TEST: CPT | Performed by: ANESTHESIOLOGY

## 2024-07-12 PROCEDURE — 25010000002 PROPOFOL 10 MG/ML EMULSION: Performed by: NURSE ANESTHETIST, CERTIFIED REGISTERED

## 2024-07-12 PROCEDURE — 25010000002 ONDANSETRON PER 1 MG: Performed by: NURSE ANESTHETIST, CERTIFIED REGISTERED

## 2024-07-12 PROCEDURE — 25010000002 MIDAZOLAM PER 1MG: Performed by: NURSE ANESTHETIST, CERTIFIED REGISTERED

## 2024-07-12 RX ORDER — MIDAZOLAM HYDROCHLORIDE 2 MG/2ML
2 INJECTION, SOLUTION INTRAMUSCULAR; INTRAVENOUS ONCE
Status: COMPLETED | OUTPATIENT
Start: 2024-07-12 | End: 2024-07-12

## 2024-07-12 RX ORDER — MEPERIDINE HYDROCHLORIDE 25 MG/ML
12.5 INJECTION INTRAMUSCULAR; INTRAVENOUS; SUBCUTANEOUS
Status: DISCONTINUED | OUTPATIENT
Start: 2024-07-12 | End: 2024-07-12 | Stop reason: HOSPADM

## 2024-07-12 RX ORDER — PROMETHAZINE HYDROCHLORIDE 25 MG/1
25 SUPPOSITORY RECTAL ONCE AS NEEDED
Status: DISCONTINUED | OUTPATIENT
Start: 2024-07-12 | End: 2024-07-12 | Stop reason: HOSPADM

## 2024-07-12 RX ORDER — DEXAMETHASONE SODIUM PHOSPHATE 4 MG/ML
INJECTION, SOLUTION INTRA-ARTICULAR; INTRALESIONAL; INTRAMUSCULAR; INTRAVENOUS; SOFT TISSUE AS NEEDED
Status: DISCONTINUED | OUTPATIENT
Start: 2024-07-12 | End: 2024-07-12 | Stop reason: HOSPADM

## 2024-07-12 RX ORDER — ROCURONIUM BROMIDE 10 MG/ML
INJECTION, SOLUTION INTRAVENOUS AS NEEDED
Status: DISCONTINUED | OUTPATIENT
Start: 2024-07-12 | End: 2024-07-12 | Stop reason: SURG

## 2024-07-12 RX ORDER — FENTANYL CITRATE 50 UG/ML
INJECTION, SOLUTION INTRAMUSCULAR; INTRAVENOUS AS NEEDED
Status: DISCONTINUED | OUTPATIENT
Start: 2024-07-12 | End: 2024-07-12 | Stop reason: SURG

## 2024-07-12 RX ORDER — PROMETHAZINE HYDROCHLORIDE 12.5 MG/1
25 TABLET ORAL ONCE AS NEEDED
Status: DISCONTINUED | OUTPATIENT
Start: 2024-07-12 | End: 2024-07-12 | Stop reason: HOSPADM

## 2024-07-12 RX ORDER — PROPOFOL 10 MG/ML
VIAL (ML) INTRAVENOUS AS NEEDED
Status: DISCONTINUED | OUTPATIENT
Start: 2024-07-12 | End: 2024-07-12 | Stop reason: SURG

## 2024-07-12 RX ORDER — ONDANSETRON 2 MG/ML
4 INJECTION INTRAMUSCULAR; INTRAVENOUS ONCE AS NEEDED
Status: DISCONTINUED | OUTPATIENT
Start: 2024-07-12 | End: 2024-07-12 | Stop reason: HOSPADM

## 2024-07-12 RX ORDER — CLINDAMYCIN PHOSPHATE 900 MG/50ML
900 INJECTION, SOLUTION INTRAVENOUS
Qty: 50 ML | Refills: 0 | Status: COMPLETED | OUTPATIENT
Start: 2024-07-12 | End: 2024-07-12

## 2024-07-12 RX ORDER — SCOLOPAMINE TRANSDERMAL SYSTEM 1 MG/1
1 PATCH, EXTENDED RELEASE TRANSDERMAL ONCE
Status: DISCONTINUED | OUTPATIENT
Start: 2024-07-12 | End: 2024-07-12 | Stop reason: HOSPADM

## 2024-07-12 RX ORDER — MIDAZOLAM HYDROCHLORIDE 2 MG/2ML
INJECTION, SOLUTION INTRAMUSCULAR; INTRAVENOUS AS NEEDED
Status: DISCONTINUED | OUTPATIENT
Start: 2024-07-12 | End: 2024-07-12 | Stop reason: SURG

## 2024-07-12 RX ORDER — BUPIVACAINE HYDROCHLORIDE 5 MG/ML
INJECTION, SOLUTION EPIDURAL; INTRACAUDAL AS NEEDED
Status: DISCONTINUED | OUTPATIENT
Start: 2024-07-12 | End: 2024-07-12 | Stop reason: HOSPADM

## 2024-07-12 RX ORDER — SODIUM CHLORIDE, SODIUM LACTATE, POTASSIUM CHLORIDE, CALCIUM CHLORIDE 600; 310; 30; 20 MG/100ML; MG/100ML; MG/100ML; MG/100ML
9 INJECTION, SOLUTION INTRAVENOUS CONTINUOUS PRN
Status: DISCONTINUED | OUTPATIENT
Start: 2024-07-12 | End: 2024-07-12 | Stop reason: HOSPADM

## 2024-07-12 RX ORDER — OXYCODONE HYDROCHLORIDE 5 MG/1
5 TABLET ORAL
Status: COMPLETED | OUTPATIENT
Start: 2024-07-12 | End: 2024-07-12

## 2024-07-12 RX ORDER — DEXAMETHASONE SODIUM PHOSPHATE 4 MG/ML
INJECTION, SOLUTION INTRA-ARTICULAR; INTRALESIONAL; INTRAMUSCULAR; INTRAVENOUS; SOFT TISSUE AS NEEDED
Status: DISCONTINUED | OUTPATIENT
Start: 2024-07-12 | End: 2024-07-12 | Stop reason: SURG

## 2024-07-12 RX ORDER — ACETAMINOPHEN 500 MG
1000 TABLET ORAL ONCE
Status: COMPLETED | OUTPATIENT
Start: 2024-07-12 | End: 2024-07-12

## 2024-07-12 RX ORDER — LIDOCAINE HYDROCHLORIDE 20 MG/ML
INJECTION, SOLUTION EPIDURAL; INFILTRATION; INTRACAUDAL; PERINEURAL AS NEEDED
Status: DISCONTINUED | OUTPATIENT
Start: 2024-07-12 | End: 2024-07-12 | Stop reason: SURG

## 2024-07-12 RX ORDER — ONDANSETRON 2 MG/ML
INJECTION INTRAMUSCULAR; INTRAVENOUS AS NEEDED
Status: DISCONTINUED | OUTPATIENT
Start: 2024-07-12 | End: 2024-07-12 | Stop reason: SURG

## 2024-07-12 RX ADMIN — PROPOFOL 100 MCG/KG/MIN: 10 INJECTION, EMULSION INTRAVENOUS at 07:30

## 2024-07-12 RX ADMIN — MIDAZOLAM HYDROCHLORIDE 2 MG: 1 INJECTION, SOLUTION INTRAMUSCULAR; INTRAVENOUS at 07:26

## 2024-07-12 RX ADMIN — MIDAZOLAM HYDROCHLORIDE 2 MG: 1 INJECTION, SOLUTION INTRAMUSCULAR; INTRAVENOUS at 07:22

## 2024-07-12 RX ADMIN — ROCURONIUM BROMIDE 50 MG: 10 INJECTION, SOLUTION INTRAVENOUS at 07:29

## 2024-07-12 RX ADMIN — ACETAMINOPHEN 1000 MG: 500 TABLET ORAL at 06:54

## 2024-07-12 RX ADMIN — SODIUM CHLORIDE, POTASSIUM CHLORIDE, SODIUM LACTATE AND CALCIUM CHLORIDE 9 ML/HR: 600; 310; 30; 20 INJECTION, SOLUTION INTRAVENOUS at 06:53

## 2024-07-12 RX ADMIN — PROPOFOL 150 MG: 10 INJECTION, EMULSION INTRAVENOUS at 07:29

## 2024-07-12 RX ADMIN — DEXAMETHASONE SODIUM PHOSPHATE 4 MG: 4 INJECTION, SOLUTION INTRAMUSCULAR; INTRAVENOUS at 09:45

## 2024-07-12 RX ADMIN — FENTANYL CITRATE 50 MCG: 50 INJECTION, SOLUTION INTRAMUSCULAR; INTRAVENOUS at 07:55

## 2024-07-12 RX ADMIN — LIDOCAINE HYDROCHLORIDE 60 MG: 20 INJECTION, SOLUTION INTRAVENOUS at 07:29

## 2024-07-12 RX ADMIN — SCOPALAMINE 1 PATCH: 1 PATCH, EXTENDED RELEASE TRANSDERMAL at 06:53

## 2024-07-12 RX ADMIN — ONDANSETRON HYDROCHLORIDE 4 MG: 2 SOLUTION INTRAMUSCULAR; INTRAVENOUS at 07:28

## 2024-07-12 RX ADMIN — OXYCODONE HYDROCHLORIDE 5 MG: 5 TABLET ORAL at 10:36

## 2024-07-12 RX ADMIN — OXYCODONE HYDROCHLORIDE 5 MG: 5 TABLET ORAL at 10:14

## 2024-07-12 RX ADMIN — DEXAMETHASONE SODIUM PHOSPHATE 4 MG: 4 INJECTION, SOLUTION INTRAMUSCULAR; INTRAVENOUS at 07:28

## 2024-07-12 RX ADMIN — SUGAMMADEX 150 MG: 100 INJECTION, SOLUTION INTRAVENOUS at 09:47

## 2024-07-12 RX ADMIN — CLINDAMYCIN PHOSPHATE 900 MG: 900 INJECTION, SOLUTION INTRAVENOUS at 07:27

## 2024-07-12 RX ADMIN — ONDANSETRON HYDROCHLORIDE 4 MG: 2 SOLUTION INTRAMUSCULAR; INTRAVENOUS at 09:45

## 2024-07-12 RX ADMIN — FENTANYL CITRATE 50 MCG: 50 INJECTION, SOLUTION INTRAMUSCULAR; INTRAVENOUS at 08:03

## 2024-07-12 RX ADMIN — FENTANYL CITRATE 100 MCG: 50 INJECTION, SOLUTION INTRAMUSCULAR; INTRAVENOUS at 07:29

## 2024-07-12 NOTE — OP NOTE
Plastic Surgery Op Note  PANNICULECTOMY WITH MODIFIED ABDOMINOPLASTY     Valentina Anderson  7/12/2024    Pre-op Diagnosis:   Pannus, abdominal [E65]      Post-Op Diagnosis Codes:     * Pannus, abdominal [E65]    Pre op diagnosis:   ABDOMINAL PANNUS      Anesthesia: General    Staff:   Circulator: Denise Hathaway RN  Scrub Person: Nori Sommer; Eusebio Salmeron  Assistant: Brenda Mcclain CRNFA    Estimated Blood Loss: 100ml    First Assistant: GENE Milner who was instrumental in helping with hemostasis, visualization and retraction structures as well as surgical closure.  Her skilled assistance was necessary for the success of this case.      Specimens:   Order Name Source Comment Collection Info Order Time   PREGNANCY, URINE Urine, Clean Catch  Collected By: Lissa Badillo 7/12/2024  6:17 AM     Release to patient   Routine Release        NICOTINE SCREEN, URINE Urine, Clean Catch  Collected By: Lissa Badillo 7/12/2024  6:19 AM     Release to patient   Routine Release              Drains:   Closed/Suction Drain 1 Right;Anterior Hip 19 Fr. (Active)       Closed/Suction Drain 2 Left;Anterior Hip Bulb 19 Fr. (Active)       Findings:   Weight:  1695g      Complications: none      Date: 7/12/2024  Time: 09:55 EDT    Procedure:  After risks and benefits were discussed with patient and informed consent was signed, patient was taken to the procedure room and positioned supine. A time out was performed confirming patient's name, procedure and location. All members at the OR room were identified.   We then proceeded with the panniculectomy in which the midline was marked and a horizontal incision was marked 7 cm from the superior border of the labia. Then the skin was excised and the subcutaneous was divided with Bovie until the anterior fascia was visualized. The dissection was carried out superiorly and laterally with care to not devitalize the flaps. Once the dissection reached the umbilical stalk, the  flap was divided at midline and the umbilicus was  from the abdominal wall. The dissection was carried out cranially and medially until the xyphoid process was palpated. Hemostasis was achieved. Then some abdominal wall weakness was corrected.  Then patient back was flexed and the flap  was evaluated for resection. We increased our midline resection to  1 cm above the umbilicus. A 3-0 nylon suture was placed at midline and the tip was transferred to measure and determined the resection in both sides. We visually evaluated and it looks symmetric.  The excess of skin was then resected bilaterally . the  hemostasis were revised, tisseal was placed over the abdominal wall.  The 3-0 stitch was removed, the abdomen was irrigated with saline , a TAP block was done bilaterally and hemostasis was again revised.  Two 15F Alexis drains were placed and fixed with 3-0 nylon. The lower abdomen incision was then closed in layers  with  3-0 Vicryl, insorb and 3-0 Vicryl.  Deessings were placed over the skin incisions. An  appropriately sized compression garment was then applied over the torso.   There were no complications and patient tolerated procedure well.      Liliana Gallego MD  07/12/24  09:43 EDT

## 2024-07-12 NOTE — ANESTHESIA POSTPROCEDURE EVALUATION
Patient: Valentina Anderson    Procedure Summary       Date: 07/12/24 Room / Location: [facility] OR  / [facility]    Anesthesia Start: 0725 Anesthesia Stop: 0957    Procedure: PANNICULECTOMY (Abdomen) Diagnosis:       Pannus, abdominal      (Pannus, abdominal [E65])    Surgeons: Liliana Gallego MD Provider: Dutch Broussard MD    Anesthesia Type: general ASA Status: 2            Anesthesia Type: general    Vitals  Vitals Value Taken Time   /91 07/12/24 1059   Temp 36.4 °C (97.5 °F) 07/12/24 1045   Pulse 92 07/12/24 1101   Resp 15 07/12/24 1045   SpO2 100 % 07/12/24 1101   Vitals shown include unfiled device data.        Post Anesthesia Care and Evaluation    Patient location during evaluation: bedside  Patient participation: complete - patient participated  Level of consciousness: awake  Pain management: adequate    Airway patency: patent  PONV Status: none  Cardiovascular status: acceptable and stable  Respiratory status: acceptable  Hydration status: acceptable

## 2024-07-12 NOTE — DISCHARGE INSTRUCTIONS
DISCHARGE INSTRUCTIONS  SURGICAL / AMBULATORY  PROCEDURES      For your surgery you had:  General anesthesia (you may have a sore throat for the first 24 hours)  IV sedation.  Local anesthesia  Monitored anesthesia Care  You received a medicated patch for nausea prevention today (behind your ear). It is recommended that you remove it 24-48 hours post-operatively. It must be removed within 72 hours.   You have received an anesthesia medication today that can cause hormonal forms of birth control to be ineffective. You should use a different form of birth control (to prevent pregnancy) for 7 days.   You may experience dizziness, drowsiness, or light-headedness for several hours following surgery/procedure.  Do not stay alone today or tonight.  Limit your activity for 24 hours.  Resume your diet slowly.  Follow whatever special dietary instructions you may have been given by your doctor.  You should not drive or operate machinery, drink alcohol, or sign legally binding documents for 24 hours or while you are taking pain medication.    Last dose of pain medication was given at:  Tylenol 1,000MG @ 06:53AM.   Oxycodone 10MG @ 10:30AM.    NOTIFY YOUR DOCTOR IF YOU EXPERIENCE ANY OF THE FOLLOWING:  Temperature greater than 101 degrees Fahrenheit  Shaking Chills  Redness or excessive drainage from incision  Nausea, vomiting and/or pain that is not controlled by prescribed medications  Increase in bleeding or bleeding that is excessive  Unable to urinate in 6 hours after surgery  If unable to reach your doctor, please go to the closest Emergency Room    Medications per physician instructions as indicated on Discharge Medication Information Sheet.  You should see   for follow-up care   on   .  Phone number:       SPECIAL INSTRUCTIONS:                                       Ok for regular diet  Do not drive for next 2 weeks  Ok to shower in 3 days , it is ok to wash the incisions   Strip drains q 8 hours and record drain  output daily. Wash hands or wear gloves when manipulating drains.  Do not use your under garments to secure your drains.   Do not exercise for the next 6 weeks.  Sleep in an upright position  Wear the garment softly, just to hold the dressings   Do not fly for 2 months    Take post-operative medications as directed on the bottle.     If you experience any issues or concerns, please contact the office at [phone]. If after hours a call service will notify the on-call provider.

## 2024-07-12 NOTE — ANESTHESIA PREPROCEDURE EVALUATION
Anesthesia Evaluation     Patient summary reviewed and Nursing notes reviewed   history of anesthetic complications:  PONV  NPO Solid Status: > 8 hours  NPO Liquid Status: > 2 hours           Airway   Mallampati: II  TM distance: >3 FB  Neck ROM: full  No difficulty expected  Dental      Pulmonary - negative pulmonary ROS and normal exam    breath sounds clear to auscultation  Cardiovascular - negative cardio ROS and normal exam  Exercise tolerance: good (4-7 METS)    Rhythm: regular  Rate: normal        Neuro/Psych  (+) psychiatric history Depression and Anxiety    ROS Comment: S/p chiari repair 2016.  Doing well, FROM  GI/Hepatic/Renal/Endo    (+) hiatal hernia, GERD, thyroid problem     ROS Comment: S/p gastric sleeve     Musculoskeletal     Abdominal    Substance History      OB/GYN          Other        ROS/Med Hx Other: PAT Nursing Notes unavailable.               Anesthesia Plan    ASA 2     general     (Patient understands anesthesia not responsible for dental damage.)  intravenous induction     Anesthetic plan, risks, benefits, and alternatives have been provided, discussed and informed consent has been obtained with: patient.    Plan discussed with CRNA.    CODE STATUS:

## 2024-07-12 NOTE — INTERVAL H&P NOTE
H&P reviewed. The patient was examined and there are no changes to the H&P.    Patient is not tachycardic  Respirations are non elaborated  Vitals:    07/12/24 0621   BP: 119/77   Pulse: 76   Resp: 20   Temp: 97.9 °F (36.6 °C)   SpO2: 98%     Risks and benefits reminded to patient who agrees to proceed

## 2024-07-15 NOTE — PROGRESS NOTES
"Chief Complaint  Post-op Follow-up    Subjective          History of Present Illness  Valentina Anderson is a 44 y.o. female who presents to [facility] for Postoperative Follow-Up of Panniculectomy 7/12/24.    Pt presents today for 1 week post op.    Allergies: Penicillins and Sulfa antibiotics  Allergies Reconciled.    Review of Systems   All review of system has been reviewed and it  is negative except the ones note above.     Objective     /74 (BP Location: Left arm, Patient Position: Sitting, Cuff Size: Adult)   Pulse 94   Ht 170.2 cm (67.01\")   Wt 70.5 kg (155 lb 6.4 oz)   SpO2 97%   BMI 24.33 kg/m²     Body mass index is 24.33 kg/m².    Physical Exam  Abdomen: incision c/d/I with no sign of infection    Result Review :                Assessment and Plan      Diagnoses and all orders for this visit:    1. Pannus, abdominal (Primary)        Plan:  Left drain removed; right will remain due to output;redressed drain with biopatch and tegaderm  Pt will return to clinic 1 week for fluid check.      Scribed by Kavya Linda, acting as a scribe for Liliana Gallego MD, 07/18/24 14:57 EDT.  Liliana Gallego MD's signature on the note affirms that the note adequately documents the care provided.          Follow Up     Return RTC in 2 weeks for fluid check.    Patient was given instructions and counseling regarding her condition. Please see specific information pulled into the AVS if appropriate.     Liliana Gallego MD  07/18/2024   Answers submitted by the patient for this visit:  Other (Submitted on 7/11/2024)  Please describe your symptoms.: Surgery follow up  Have you had these symptoms before?: No  How long have you been having these symptoms?: 5-7 days  Please list any medications you are currently taking for this condition.: clindamycin, zofran  Primary Reason for Visit (Submitted on 7/11/2024)  What is the primary reason for your visit?: " Other

## 2024-07-18 ENCOUNTER — OFFICE VISIT (OUTPATIENT)
Dept: PLASTIC SURGERY | Facility: CLINIC | Age: 44
End: 2024-07-18
Payer: COMMERCIAL

## 2024-07-18 VITALS
HEIGHT: 67 IN | OXYGEN SATURATION: 97 % | WEIGHT: 155.4 LBS | BODY MASS INDEX: 24.39 KG/M2 | HEART RATE: 94 BPM | DIASTOLIC BLOOD PRESSURE: 74 MMHG | SYSTOLIC BLOOD PRESSURE: 112 MMHG

## 2024-07-18 DIAGNOSIS — E65 PANNUS, ABDOMINAL: Primary | ICD-10-CM

## 2024-07-18 PROCEDURE — 99024 POSTOP FOLLOW-UP VISIT: CPT | Performed by: SURGERY

## 2024-07-22 NOTE — PROGRESS NOTES
"Chief Complaint  Post-op Follow-up    Subjective          History of Present Illness  Valentina Anderson is a 44 y.o. female who presents to [facility] for Postoperative Follow-Up of Panniculectomy 7/12/24.    Pt presents today for 3 week post op.  Concerned about knot in left groin area and belly button infection  Drain intact  Output 45, 45, 30    Allergies: Penicillins and Sulfa antibiotics  Allergies Reconciled.    Review of Systems   All review of system has been reviewed and it  is negative except the ones note above.     Objective     /83 (BP Location: Right arm, Patient Position: Sitting, Cuff Size: Adult)   Pulse 84   Ht 170.2 cm (67.01\")   Wt 71.4 kg (157 lb 8 oz)   SpO2 98%   BMI 24.66 kg/m²     Body mass index is 24.66 kg/m².    Physical Exam  Abdomen: incision c/d/I with no sign of infection, localized seroma on the right groin.     Result Review :                Assessment and Plan    Aspirated 30cc of fluid from groin using bedside ultrasound  Will keep drain and contact office when 30cc or below for 2 consecutive days  Redressed drain site with biopatch and tegaderm  Instructed to put betadine in belly button daily and cover with gauze.  Diagnoses and all orders for this visit:    1. S/P panniculectomy (Primary)          Plan:  RTO 1 month for suture removal from umbilicus    Scribed by Kavya Linda, acting as a scribe for Liliana Gallego MD, 07/18/24 14:57 EDT.  Liliana Gallego MD's signature on the note affirms that the note adequately documents the care provided.          Follow Up     No follow-ups on file.    Patient was given instructions and counseling regarding her condition. Please see specific information pulled into the AVS if appropriate.     Liliana Gallego MD  08/01/2024   Answers submitted by the patient for this visit:  Other (Submitted on 7/25/2024)  Please describe your symptoms.: surgical follow up  Have " you had these symptoms before?: Yes  How long have you been having these symptoms?: 1-2 weeks  Please describe any probable cause for these symptoms. : surgery  Primary Reason for Visit (Submitted on 7/25/2024)  What is the primary reason for your visit?: Other

## 2024-07-26 ENCOUNTER — TELEPHONE (OUTPATIENT)
Dept: PLASTIC SURGERY | Facility: CLINIC | Age: 44
End: 2024-07-26
Payer: COMMERCIAL

## 2024-07-31 ENCOUNTER — OFFICE VISIT (OUTPATIENT)
Dept: BARIATRICS/WEIGHT MGMT | Facility: CLINIC | Age: 44
End: 2024-07-31
Payer: COMMERCIAL

## 2024-07-31 VITALS
HEIGHT: 67 IN | SYSTOLIC BLOOD PRESSURE: 107 MMHG | BODY MASS INDEX: 24.64 KG/M2 | DIASTOLIC BLOOD PRESSURE: 68 MMHG | HEART RATE: 78 BPM | TEMPERATURE: 98.9 F | WEIGHT: 157 LBS

## 2024-07-31 DIAGNOSIS — Z98.84 S/P LAPAROSCOPIC SLEEVE GASTRECTOMY: ICD-10-CM

## 2024-07-31 PROBLEM — E65 PANNUS, ABDOMINAL: Status: RESOLVED | Noted: 2023-12-22 | Resolved: 2024-07-31

## 2024-07-31 RX ORDER — SEMAGLUTIDE 2.4 MG/.75ML
2.4 INJECTION, SOLUTION SUBCUTANEOUS WEEKLY
Qty: 9 ML | Refills: 1 | Status: SHIPPED | OUTPATIENT
Start: 2024-07-31 | End: 2024-10-23

## 2024-07-31 NOTE — PROGRESS NOTES
[facility]  [address]  [facility] [id number] [phone]  [address]  [facility] [id number] [phone]  Dept: [phone]  7/31/2024      Valentina Anderson.  [id number]  [id number]  1980  female      Chief Complaint   Patient presents with    Follow-up     Follow up sleeve       BH Post-Op Bariatric Surgery:   Valentina Anderson is status post Laparoscopic Sleeve/PEH procedure, performed on 11/4/20 who just underwent panniculectomy a little over two weeks ago. She is currently taking wegovy at 2.4mg weekly. She started this in 2022 and has lost and maintained a total of 57lb since.     HPI:       Today's weight is 71.2 kg (157 lb) pounds, today's BMI is Body mass index is 24.58 kg/m²., she has a gain of 1 pounds since the last visit and her weight loss since surgery is 146 pounds. The patient reports a decreased portion size and loss of appetite.    Valentina Anderson denies nausea, vomiting, reflux.  For mild heartburn that is well-controlled with Prilosec as needed.     Diet and Exercise: Diet history reviewed and discussed with the patient. Weight loss/gains to date discussed with the patient. The patient states they are eating 60-80 grams of protein per day. She reports eating 3 meals per day, a typical portion size of 1/2 cup, eating 1-2 snacks per day, drinking 5-6 or more 8-oz. glasses of water per day, no carbonated beverage consumption and exercising regularly -walking 1-3 days per week    Supplements: pureform MTV.     Review of Systems   Constitutional:  Positive for appetite change. Negative for fatigue and unexpected weight change.   HENT: Negative.     Eyes: Negative.    Respiratory: Negative.     Cardiovascular: Negative.  Negative for leg swelling.   Gastrointestinal:  Negative for abdominal distention, abdominal pain, constipation, diarrhea, nausea and vomiting.   Genitourinary:  Negative for  difficulty urinating, frequency and urgency.   Musculoskeletal:  Negative for back pain.   Skin: Negative.  Positive for wound.        Incisional soreness, improving daily   Psychiatric/Behavioral: Negative.     All other systems reviewed and are negative.      Patient Active Problem List   Diagnosis    Chiari malformation type I    Dizziness    New daily persistent headache    History of brain surgery    Degeneration of intervertebral disc at C6-C7 level    Bilateral occipital neuralgia    Chronic fatigue    Multiple joint pain    Anxiety and depression    Hypothyroid    PCOS (polycystic ovarian syndrome)    Endometriosis    Multiple gastric polyps    S/P laparoscopic sleeve gastrectomy    Dietary counseling    Abnormal electrocardiogram    Vitamin D deficiency    Body mass index (BMI) of 24.0 to 24.9 in adult    Altered bowel habits    Constipation    Diarrhea       Past Medical History:   Diagnosis Date    Chiari I malformation     REPAIRED    DDD (degenerative disc disease), cervical     FULL ROM    Disease of thyroid gland     Dizziness     Endometriosis     Fibromyalgia, primary Nov 2022    GERD (gastroesophageal reflux disease)     Headache     Heart palpitations     OCC PVC, BENIGN CARDIAC W/U    Hiatal hernia     Migraine Aug 2019    Peripheral neuropathy July 2019    Right greater than left arm/hands    PONV (postoperative nausea and vomiting)        The following portions of the patient's history were reviewed and updated as appropriate: allergies, current medications, past family history, past medical history, past social history, past surgical history, and problem list.    Vitals:    07/31/24 1509   BP: 107/68   Pulse: 78   Temp: 98.9 °F (37.2 °C)       Physical Exam  Vitals and nursing note reviewed.   Constitutional:       Appearance: She is well-developed. She is not diaphoretic.   Pulmonary:      Effort: Pulmonary effort is normal.   Neurological:      Mental Status: She is alert and oriented to  person, place, and time.   Psychiatric:         Behavior: Behavior normal.         Assessment:   Post-op, the patient is doing well.     Encounter Diagnoses   Name Primary?    Body mass index (BMI) of 24.0 to 24.9 in adult Yes    S/P laparoscopic sleeve gastrectomy        Plan:   Continue wegovy  Encouraged patient to be sure to get plenty of lean protein per day through small frequent meals all with a protein source.   Activity restrictions: none.   Recommended patient be sure to get at least 70 grams of protein per day by eating small, frequent meals all with high lean protein choices. Be sure to limit/cut back on daily carbohydrate intake. Discussed with the patient the recommended amount of water per day to intake- half of body weight in ounces. Reviewed vitamin requirements. Be sure to do routine exercise, 150 minutes per week minimum, including both cardio and strength training.     Instructions / Recommendations: dietary counseling recommended, recommended a daily protein intake of  grams, vitamin supplement(s) recommended, recommended exercising at least 150 minutes per week, behavior modifications recommended and instructed to call the office for concerns, questions, or problems.     The patient was instructed to follow up in 6 months .   Total time spent during this encounter today was 30 minutes

## 2024-08-01 ENCOUNTER — OFFICE VISIT (OUTPATIENT)
Dept: PLASTIC SURGERY | Facility: CLINIC | Age: 44
End: 2024-08-01
Payer: COMMERCIAL

## 2024-08-01 VITALS
HEART RATE: 84 BPM | BODY MASS INDEX: 24.72 KG/M2 | WEIGHT: 157.5 LBS | SYSTOLIC BLOOD PRESSURE: 125 MMHG | HEIGHT: 67 IN | DIASTOLIC BLOOD PRESSURE: 83 MMHG | OXYGEN SATURATION: 98 %

## 2024-08-01 DIAGNOSIS — Z98.890 S/P PANNICULECTOMY: Primary | ICD-10-CM

## 2024-08-02 PROBLEM — Z98.890 S/P PANNICULECTOMY: Status: ACTIVE | Noted: 2024-08-02

## 2024-08-05 ENCOUNTER — TELEPHONE (OUTPATIENT)
Dept: PLASTIC SURGERY | Facility: CLINIC | Age: 44
End: 2024-08-05
Payer: COMMERCIAL

## 2024-08-05 ENCOUNTER — CLINICAL SUPPORT (OUTPATIENT)
Dept: PLASTIC SURGERY | Facility: CLINIC | Age: 44
End: 2024-08-05
Payer: COMMERCIAL

## 2024-08-05 NOTE — TELEPHONE ENCOUNTER
Patient wants to know if she can get her drain pulled.  Output   Friday 15cc  Saturday 5cc  Sunday 10cc

## 2024-08-05 NOTE — PROGRESS NOTES
Valentina Anderson is here today for drain removal. Drain site is clean and dry, drain is draining less than 30mL for the last 3 days. Drain removed from right upper leg, site cleaned with alcohol pad. Bacitracin and 2x2 applied to site, secured with paper tape. Advised patient to keep dressing and bacitracin over drain sites until they scab over and stop draining. Patient to follow up in clinic on 9/4/24 with Dr. Gallego/VINCE Vega. Advised patient to keep dressing and bacitracin over drain sites until they scab over and stop draining. her was advised to contact the office should her have any concerns or show any signs/symptoms of infection. Patient verbalized understanding of all instructions given.

## 2024-08-07 ENCOUNTER — OFFICE VISIT (OUTPATIENT)
Dept: NEUROLOGY | Facility: CLINIC | Age: 44
End: 2024-08-07
Payer: COMMERCIAL

## 2024-08-07 VITALS
HEART RATE: 82 BPM | WEIGHT: 156 LBS | DIASTOLIC BLOOD PRESSURE: 82 MMHG | SYSTOLIC BLOOD PRESSURE: 124 MMHG | OXYGEN SATURATION: 97 % | BODY MASS INDEX: 24.43 KG/M2

## 2024-08-07 DIAGNOSIS — R20.2 PARESTHESIAS: ICD-10-CM

## 2024-08-07 DIAGNOSIS — E53.8 B12 DEFICIENCY: ICD-10-CM

## 2024-08-07 DIAGNOSIS — M54.81 BILATERAL OCCIPITAL NEURALGIA: Primary | ICD-10-CM

## 2024-08-07 RX ORDER — CYANOCOBALAMIN 1000 UG/ML
1000 INJECTION, SOLUTION INTRAMUSCULAR; SUBCUTANEOUS DAILY
Qty: 12 ML | Refills: 0 | Status: SHIPPED | OUTPATIENT
Start: 2024-08-07

## 2024-08-07 NOTE — PROGRESS NOTES
Chief Complaint   Patient presents with    Occipital neuralgia of right side       Patient ID: Valentina Anderson is a 44 y.o. female.    HPI: I have had the pleasure of seeing your patient again today.  As you may know she is a 44-year-old female here for the management of occipital neuralgia, headache as well as B12 deficiency.  She states that she recently had a vitamin B12 level in the 200s.  It was 294.  She did show me a report from her primary care physician's office.  She is not currently on B12 injection protocol.  She has had B12 deficiency in the past.  She is now experiencing more paresthesias.  Predominantly of the arms.  She has had some symptoms in her left thigh, particularly twitching.  She is doing very well with respect to symptoms of occipital neuralgia.  It really has not bothered her much at all recently.  The nerve blocks seem to work well for her.  She did mention a little bit of tightness bilaterally at the chest under the breast.  It lasted for few minutes.  This did occur approximately 3 times.  No focal weakness or numbness.  No double vision or loss of vision.  No slurred speech or trouble swallowing.  No impairment of balance.      The following portions of the patient's history were reviewed and updated as appropriate: allergies, current medications, past family history, past medical history, past social history, past surgical history and problem list.    Review of Systems   Neurological:  Positive for headaches.      I have reviewed the review of systems above performed by my medical assistant.      Vitals:    08/07/24 1631   BP: 124/82   Pulse: 82   SpO2: 97%       Neurologic Exam     Mental Status   Oriented to person, place, and time.   Concentration: normal.   Level of consciousness: alert  Knowledge: consistent with education (No deficits found.).     Cranial Nerves     CN II   Visual fields full to confrontation.     CN III, IV, VI   Pupils are equal, round, and reactive to  light.  Extraocular motions are normal.   CN III: no CN III palsy  CN VI: no CN VI palsy    CN V   Facial sensation intact.     CN VII   Facial expression full, symmetric.     CN VIII   CN VIII normal.     CN IX, X   CN IX normal.   CN X normal.     CN XI   CN XI normal.     CN XII   CN XII normal.     Motor Exam     Strength   Right neck flexion: 5/5  Left neck flexion: 5/5  Right neck extension: 5/5  Left neck extension: 5/5  Right deltoid: 5/5  Left deltoid: 5/5  Right biceps: 5/5  Left biceps: 5/5  Right triceps: 5/5  Left triceps: 5/5  Right wrist flexion: 5/5  Left wrist flexion: 5/5  Right wrist extension: 5/5  Left wrist extension: 5/5  Right interossei: 5/5  Left interossei: 5/5  Right abdominals: 5/5  Left abdominals: 5/5  Right iliopsoas: 5/5  Left iliopsoas: 5/5  Right quadriceps: 5/5  Left quadriceps: 5/5  Right hamstrin/5  Left hamstrin/5  Right glutei: 5/5  Left glutei: 5/5  Right anterior tibial: 5/5  Left anterior tibial: 5/5  Right posterior tibial: 5/5  Left posterior tibial: 5/5  Right peroneal: 5/5  Left peroneal: 5/5  Right gastroc: 5/5  Left gastroc: 5/5    Sensory Exam   Light touch normal.   Vibration normal.     Gait, Coordination, and Reflexes     Gait  Gait: normal    Reflexes   Right brachioradialis: 2+  Left brachioradialis: 2+  Right biceps: 2+  Left biceps: 2+  Right triceps: 2+  Left triceps: 2+  Right patellar: 2+  Left patellar: 2+  Right achilles: 2+  Left achilles: 2+  Right : 2+  Left : 2+Station is normal.       Physical Exam  Vitals reviewed.   Constitutional:       Appearance: She is well-developed.   HENT:      Head: Normocephalic and atraumatic.   Eyes:      Extraocular Movements: EOM normal.      Pupils: Pupils are equal, round, and reactive to light.   Cardiovascular:      Rate and Rhythm: Normal rate and regular rhythm.   Pulmonary:      Breath sounds: Normal breath sounds.   Musculoskeletal:         General: Normal range of motion.   Skin:     General:  Skin is warm.   Neurological:      Mental Status: She is oriented to person, place, and time.      Gait: Gait is intact.      Deep Tendon Reflexes:      Reflex Scores:       Tricep reflexes are 2+ on the right side and 2+ on the left side.       Bicep reflexes are 2+ on the right side and 2+ on the left side.       Brachioradialis reflexes are 2+ on the right side and 2+ on the left side.       Patellar reflexes are 2+ on the right side and 2+ on the left side.       Achilles reflexes are 2+ on the right side and 2+ on the left side.        Procedures    Assessment/Plan: We will continue with a vitamin B12 injection protocol for her.  Will check the lab after the protocol and see her after for review.  A total of 30 minutes was spent face-to-face with the patient today.  Of that greater than 50% of this time was spent discussing signs and symptoms of occipital neuralgia, B12 deficiency, paresthesias, patient education, plan of care and prognosis.         Diagnoses and all orders for this visit:    1. Bilateral occipital neuralgia (Primary)    2. Paresthesias  -     Vitamin B12  -     cyanocobalamin 1000 MCG/ML injection; Inject 1 mL into the appropriate muscle as directed by prescriber Daily. 1 mL q week x 4 weeks, 1 mL qow x 4 mos  Dispense: 12 mL; Refill: 0    3. B12 deficiency  -     Vitamin B12  -     cyanocobalamin 1000 MCG/ML injection; Inject 1 mL into the appropriate muscle as directed by prescriber Daily. 1 mL q week x 4 weeks, 1 mL qow x 4 mos  Dispense: 12 mL; Refill: 0           Arnulfo Judd II, MD

## 2024-08-28 NOTE — PROGRESS NOTES
"Chief Complaint  Post-op Follow-up    Subjective          History of Present Illness  Valentina Anderson is a 44 y.o. female who presents to [facility] for Postoperative Follow-Up of Panniculectomy 7/12/24.    Pt presents today for 8 week post op and sutures removal from umbilicus.    Allergies: Penicillins and Sulfa antibiotics  Allergies Reconciled.    Review of Systems   All review of system has been reviewed and it  is negative except the ones note above.     Objective     /78 (BP Location: Left arm, Patient Position: Sitting, Cuff Size: Adult)   Pulse 77   Ht 170.2 cm (67.01\")   Wt 70.2 kg (154 lb 12.8 oz)   SpO2 98%   BMI 24.24 kg/m²     Body mass index is 24.24 kg/m².    Physical Exam  Abdomen: induration of scar with no signs of dehiscence or infection     Result Review :                Assessment and Plan      Diagnoses and all orders for this visit:    1. S/P panniculectomy (Primary)            Plan:  Photos obtained  Removed umbilicus sutures  Advised to massage incisions with vitamin e and start using scare tape    Scribed by Kavya Linda, acting as a scribe for Liliana Gallego MD, 09/04/24 15:22 EDT.  Liliana Gallego MD's signature on the note affirms that the note adequately documents the care provided.            Follow Up     Return RTC in 3 months.    Patient was given instructions and counseling regarding her condition. Please see specific information pulled into the AVS if appropriate.     Liliana Gallego MD  09/04/2024     Answers submitted by the patient for this visit:  Other (Submitted on 8/28/2024)  Please describe your symptoms.: Surgical follow up  Have you had these symptoms before?: No  How long have you been having these symptoms?: Greater than 2 weeks  Primary Reason for Visit (Submitted on 8/28/2024)  What is the primary reason for your visit?: Other    "

## 2024-09-04 ENCOUNTER — OFFICE VISIT (OUTPATIENT)
Dept: PLASTIC SURGERY | Facility: CLINIC | Age: 44
End: 2024-09-04
Payer: COMMERCIAL

## 2024-09-04 VITALS
SYSTOLIC BLOOD PRESSURE: 117 MMHG | HEIGHT: 67 IN | WEIGHT: 154.8 LBS | BODY MASS INDEX: 24.3 KG/M2 | HEART RATE: 77 BPM | OXYGEN SATURATION: 98 % | DIASTOLIC BLOOD PRESSURE: 78 MMHG

## 2024-09-04 DIAGNOSIS — Z98.890 S/P PANNICULECTOMY: Primary | ICD-10-CM

## 2024-09-04 PROCEDURE — 99024 POSTOP FOLLOW-UP VISIT: CPT | Performed by: SURGERY

## 2024-09-04 RX ORDER — MELOXICAM 15 MG/1
15 TABLET ORAL DAILY
Qty: 30 TABLET | Refills: 2 | Status: SHIPPED | OUTPATIENT
Start: 2024-09-04

## 2024-09-23 ENCOUNTER — TRANSCRIBE ORDERS (OUTPATIENT)
Dept: ADMINISTRATIVE | Facility: HOSPITAL | Age: 44
End: 2024-09-23
Payer: COMMERCIAL

## 2024-09-23 DIAGNOSIS — Z12.31 VISIT FOR SCREENING MAMMOGRAM: Primary | ICD-10-CM

## 2024-09-24 ENCOUNTER — DOCUMENTATION (OUTPATIENT)
Dept: PHYSICAL THERAPY | Facility: CLINIC | Age: 44
End: 2024-09-24
Payer: COMMERCIAL

## 2024-10-18 ENCOUNTER — OFFICE VISIT (OUTPATIENT)
Dept: INTERNAL MEDICINE | Age: 44
End: 2024-10-18
Payer: COMMERCIAL

## 2024-10-18 VITALS
WEIGHT: 159 LBS | TEMPERATURE: 97.3 F | OXYGEN SATURATION: 98 % | BODY MASS INDEX: 24.96 KG/M2 | SYSTOLIC BLOOD PRESSURE: 122 MMHG | HEART RATE: 88 BPM | DIASTOLIC BLOOD PRESSURE: 82 MMHG | HEIGHT: 67 IN

## 2024-10-18 DIAGNOSIS — M50.323 DEGENERATION OF INTERVERTEBRAL DISC AT C6-C7 LEVEL: ICD-10-CM

## 2024-10-18 DIAGNOSIS — G93.5 CHIARI MALFORMATION TYPE I: Primary | ICD-10-CM

## 2024-10-18 DIAGNOSIS — Z98.84 S/P LAPAROSCOPIC SLEEVE GASTRECTOMY: ICD-10-CM

## 2024-10-18 DIAGNOSIS — Z98.890 S/P PANNICULECTOMY: ICD-10-CM

## 2024-10-18 DIAGNOSIS — E55.9 VITAMIN D DEFICIENCY: ICD-10-CM

## 2024-10-18 DIAGNOSIS — J40 BRONCHITIS: ICD-10-CM

## 2024-10-18 DIAGNOSIS — E53.8 B12 DEFICIENCY: ICD-10-CM

## 2024-10-18 DIAGNOSIS — E06.3 HYPOTHYROIDISM DUE TO HASHIMOTO THYROIDITIS: ICD-10-CM

## 2024-10-18 RX ORDER — AZITHROMYCIN 250 MG/1
TABLET, FILM COATED ORAL
Qty: 6 TABLET | Refills: 0 | Status: SHIPPED | OUTPATIENT
Start: 2024-10-18

## 2024-10-18 NOTE — PROGRESS NOTES
Chief Complaint  Establish Care (Establish care)    Subjective      Valentina Anderson is a 44 y.o. female who presents to [facility]     History of Present Illness  The patient is a 40-year-old female who presents for a wellness visit. Fu on thyroid disease and for an acute issue with cough and congestion     She has done well status post sleeve has lost significant amount of weight close to 100 pounds.  In July had her panniculectomy with modified abdominoplasty did well satisfied with her results she continues on Wegovy and her weight is stable.  Feels good energy is satisfactory.    Cough and congestion for about 10 days she reports feeling significantly better today compared to yesterday, attributing her previous discomfort to recent travel and weather changes. Her symptoms included a runny nose, sneezing, and a morning cough, but no fever. She reports coughing up dark yellow phlegm in the mornings and took Phenergan DM syrup at night, which helped with her congestion, runny nose, and sneezing.  However her symptoms persist she denies any fever shortness of air or chest pain but continues with productive cough.    She underwent a panniculectomy on 07/12/2024, which was a challenging experience, particularly in the first 3 to 4 days post-surgery. She experienced difficulty standing upright and sleeping, and used an airplane neck pillow for support. She did not take any narcotics for pain relief. She found that log rolling out of bed and wearing a binder were helpful. She still has some swelling, which she was informed could last from 6 months to a year. She had one drain for over a month, with the left side being removed within a week and a half, while the right side continued to drain. She was advised to use vitamin E and scar tape for her surgical scar, which she reports is still swollen and feels like it is pulling. She had a stitch in her navel for several months.    She  "reports that her joint pain and headaches have improved, although she experienced hip pain during a recent vacation due to increased activity.  All in all she does feel like the joint pain and the headaches are improved      Thyroid disease she continues to take levothyroxine 75 each morning on an empty stomach and uses Prilosec as needed, although she reports not needing it recently. She is on Wegovy for weight maintenance and has gained 4 pounds since her vacation, maintaining a weight between 150 and 155 pounds. She reports no side effects from Wegovy and has regular lab tests. She is also on a B12 protocol prescribed by her neurologist and reports no issues with anemia or iron levels. She reports no change in her energy levels and does not take vitamin D supplements.    She has a mammogram scheduled for Tuesday and is up to date with her colonoscopy and Pap smear, which was performed in 09/2024.   Dr mckay, 2024        Objective   Vital Signs:   Vitals:    10/18/24 1604   BP: 122/82   BP Location: Right arm   Patient Position: Sitting   Cuff Size: Adult   Pulse: 88   Temp: 97.3 °F (36.3 °C)   TempSrc: Skin   SpO2: 98%   Weight: 72.1 kg (159 lb)   Height: 170.2 cm (67.01\")     Body mass index is 24.9 kg/m².    Wt Readings from Last 3 Encounters:   10/18/24 72.1 kg (159 lb)   09/04/24 70.2 kg (154 lb 12.8 oz)   08/07/24 70.8 kg (156 lb)     BP Readings from Last 3 Encounters:   10/18/24 122/82   09/04/24 117/78   08/07/24 124/82       Health Maintenance   Topic Date Due    TDAP/TD VACCINES (1 - Tdap) Never done    ANNUAL PHYSICAL  Never done    PAP SMEAR  Never done    INFLUENZA VACCINE  08/01/2024    COVID-19 Vaccine (4 - 2023-24 season) 09/01/2024    MAMMOGRAM  09/12/2025    COLORECTAL CANCER SCREENING  11/21/2033    HEPATITIS C SCREENING  Completed    Pneumococcal Vaccine 0-64  Aged Out       Physical Exam  Constitutional:       Appearance: Normal appearance.   HENT:      Head: Normocephalic.      Nose: Nose " normal.      Mouth/Throat:      Mouth: Mucous membranes are moist.   Eyes:      Conjunctiva/sclera: Conjunctivae normal.      Pupils: Pupils are equal, round, and reactive to light.   Cardiovascular:      Rate and Rhythm: Normal rate and regular rhythm.   Pulmonary:      Effort: Pulmonary effort is normal.      Breath sounds: Normal breath sounds.      Comments: Right lower lobe rhonchi noted  Abdominal:      General: Bowel sounds are normal.      Palpations: Abdomen is soft.      Comments: Large scar from surgery all approximated healing without signs and symptoms of infection   Musculoskeletal:         General: Normal range of motion.      Cervical back: Normal range of motion.   Skin:     General: Skin is warm and dry.   Neurological:      General: No focal deficit present.      Mental Status: She is alert and oriented to person, place, and time.   Psychiatric:         Mood and Affect: Mood normal.         Behavior: Behavior normal.         Thought Content: Thought content normal.          Physical Exam  Right lower lobe of lungs sounds congested.       Result Review :  The following data was reviewed by: VINCE Matthews on 10/18/2024:    Labs  No visits with results within 2 Month(s) from this visit.   Latest known visit with results is:   Admission on 07/12/2024, Discharged on 07/12/2024   Component Date Value Ref Range Status    HCG, Urine QL 07/12/2024 Negative  Negative Final    Cotinine Screen, Urine  07/12/2024 Negative  Negative Final        Imaging  No Images in the past 120 days found..    Results         Procedures         ASSESSMENT & PLAN  Diagnoses and all orders for this visit:    1. Chiari malformation type I (Primary)    2. Degeneration of intervertebral disc at C6-C7 level    3. Hypothyroidism due to Hashimoto thyroiditis  -     TSH Rfx On Abnormal To Free T4; Future    4. Vitamin D deficiency  -     Vitamin D,25-Hydroxy; Future    5. S/P panniculectomy    6. S/P laparoscopic sleeve  gastrectomy  -     Vitamin D,25-Hydroxy; Future  -     TSH Rfx On Abnormal To Free T4; Future  -     Lipid Panel; Future  -     Comprehensive Metabolic Panel; Future  -     CBC & Differential; Future    7. B12 deficiency    8. Bronchitis  -     azithromycin (Zithromax Z-Aung) 250 MG tablet; Take 2 tablets by mouth on day 1, then 1 tablet daily on days 2-5  Dispense: 6 tablet; Refill: 0         Assessment & Plan  1. Upper Respiratory Infection.  She reports a runny nose, sneezing, and morning cough without fever. There is noticeable congestion in the right lower lobe, suggestive of bronchitis or possibly pneumonia. A Z-Aung take as directed.  Return to the office if she experiences wheezing, difficulty breathing, or if symptoms worsen.     2. Post-Panniculectomy Status.  She is 12 weeks post-panniculectomy and reports significant initial pain and swelling, which is expected to last 6 months to a year. The scar is healing, and she has been advised to use vitamin E and scar tape. She reports no current pain but mentions residual swelling.    3. Hypothyroidism.  She continues to take levothyroxine 75 mcg each morning on an empty stomach. Her TSH levels will be monitored to ensure stability.    4. Gastroesophageal Reflux Disease (GERD).  She takes Prilosec as needed but reports minimal need for it currently.    5. Weight Management.  She is on Wegovy for weight maintenance and reports stable weight with no significant side effects. She will continue this regimen as prescribed.    6. Vitamin D Deficiency.  A vitamin D level check will be performed as she has not been taking vitamin D supplements recently.    7. Health Maintenance.  She has a mammogram scheduled for Tuesday as part of her regular yearly screening. She is up to date with her colonoscopy and Pap smear, which was done in September and was normal. She is advised to get her influenza vaccine after completing the Z-Aung course if needed.         BMI is within  normal parameters. No other follow-up for BMI required.           FOLLOW UP  No follow-ups on file.  Patient was given instructions and counseling regarding her condition or for health maintenance advice. Please see specific information pulled into the AVS if appropriate.     Patient or patient representative verbalized consent for the use of Ambient Listening during the visit with  VINCE Matthews for chart documentation. 10/20/2024  16:18 EDT    VINCE Matthews  10/20/24  20:23 EDT

## 2024-10-22 ENCOUNTER — HOSPITAL ENCOUNTER (OUTPATIENT)
Dept: MAMMOGRAPHY | Facility: HOSPITAL | Age: 44
Discharge: HOME | End: 2024-10-22
Admitting: NURSE PRACTITIONER
Payer: COMMERCIAL

## 2024-10-22 DIAGNOSIS — Z12.31 VISIT FOR SCREENING MAMMOGRAM: ICD-10-CM

## 2024-10-22 PROCEDURE — 77067 SCR MAMMO BI INCL CAD: CPT

## 2024-10-22 PROCEDURE — 77063 BREAST TOMOSYNTHESIS BI: CPT

## 2024-10-23 ENCOUNTER — LAB (OUTPATIENT)
Dept: LAB | Facility: HOSPITAL | Age: 44
End: 2024-10-23
Payer: COMMERCIAL

## 2024-10-23 DIAGNOSIS — Z98.84 S/P LAPAROSCOPIC SLEEVE GASTRECTOMY: ICD-10-CM

## 2024-10-23 DIAGNOSIS — E55.9 VITAMIN D DEFICIENCY: ICD-10-CM

## 2024-10-23 DIAGNOSIS — E06.3 HYPOTHYROIDISM DUE TO HASHIMOTO THYROIDITIS: ICD-10-CM

## 2024-10-23 LAB
25(OH)D3 SERPL-MCNC: 37.1 NG/ML (ref 30–100)
ALBUMIN SERPL-MCNC: 3.9 G/DL (ref 3.5–5.2)
ALBUMIN/GLOB SERPL: 1.2 G/DL
ALP SERPL-CCNC: 46 U/L (ref 39–117)
ALT SERPL W P-5'-P-CCNC: 10 U/L (ref 1–33)
ANION GAP SERPL CALCULATED.3IONS-SCNC: 8.8 MMOL/L (ref 5–15)
AST SERPL-CCNC: 18 U/L (ref 1–32)
BASOPHILS # BLD AUTO: 0.05 10*3/MM3 (ref 0–0.2)
BASOPHILS NFR BLD AUTO: 1.2 % (ref 0–1.5)
BILIRUB SERPL-MCNC: 0.3 MG/DL (ref 0–1.2)
BUN SERPL-MCNC: 10 MG/DL (ref 6–20)
BUN/CREAT SERPL: 10.6 (ref 7–25)
CALCIUM SPEC-SCNC: 9.1 MG/DL (ref 8.6–10.5)
CHLORIDE SERPL-SCNC: 105 MMOL/L (ref 98–107)
CHOLEST SERPL-MCNC: 136 MG/DL (ref 0–200)
CO2 SERPL-SCNC: 26.2 MMOL/L (ref 22–29)
CREAT SERPL-MCNC: 0.94 MG/DL (ref 0.57–1)
DEPRECATED RDW RBC AUTO: 41.7 FL (ref 37–54)
EGFRCR SERPLBLD CKD-EPI 2021: 75.9 ML/MIN/1.73
EOSINOPHIL # BLD AUTO: 0.14 10*3/MM3 (ref 0–0.4)
EOSINOPHIL NFR BLD AUTO: 3.4 % (ref 0.3–6.2)
ERYTHROCYTE [DISTWIDTH] IN BLOOD BY AUTOMATED COUNT: 12.6 % (ref 12.3–15.4)
GLOBULIN UR ELPH-MCNC: 3.2 GM/DL
GLUCOSE SERPL-MCNC: 86 MG/DL (ref 65–99)
HCT VFR BLD AUTO: 42.9 % (ref 34–46.6)
HDLC SERPL-MCNC: 57 MG/DL (ref 40–60)
HGB BLD-MCNC: 14.2 G/DL (ref 12–15.9)
IMM GRANULOCYTES # BLD AUTO: 0.01 10*3/MM3 (ref 0–0.05)
IMM GRANULOCYTES NFR BLD AUTO: 0.2 % (ref 0–0.5)
LDLC SERPL CALC-MCNC: 66 MG/DL (ref 0–100)
LDLC/HDLC SERPL: 1.18 {RATIO}
LYMPHOCYTES # BLD AUTO: 1.81 10*3/MM3 (ref 0.7–3.1)
LYMPHOCYTES NFR BLD AUTO: 44 % (ref 19.6–45.3)
MCH RBC QN AUTO: 30.3 PG (ref 26.6–33)
MCHC RBC AUTO-ENTMCNC: 33.1 G/DL (ref 31.5–35.7)
MCV RBC AUTO: 91.5 FL (ref 79–97)
MONOCYTES # BLD AUTO: 0.33 10*3/MM3 (ref 0.1–0.9)
MONOCYTES NFR BLD AUTO: 8 % (ref 5–12)
NEUTROPHILS NFR BLD AUTO: 1.77 10*3/MM3 (ref 1.7–7)
NEUTROPHILS NFR BLD AUTO: 43.2 % (ref 42.7–76)
NRBC BLD AUTO-RTO: 0 /100 WBC (ref 0–0.2)
PLATELET # BLD AUTO: 317 10*3/MM3 (ref 140–450)
PMV BLD AUTO: 9.3 FL (ref 6–12)
POTASSIUM SERPL-SCNC: 4.3 MMOL/L (ref 3.5–5.2)
PROT SERPL-MCNC: 7.1 G/DL (ref 6–8.5)
RBC # BLD AUTO: 4.69 10*6/MM3 (ref 3.77–5.28)
SODIUM SERPL-SCNC: 140 MMOL/L (ref 136–145)
TRIGL SERPL-MCNC: 60 MG/DL (ref 0–150)
TSH SERPL DL<=0.05 MIU/L-ACNC: 2.01 UIU/ML (ref 0.27–4.2)
VLDLC SERPL-MCNC: 13 MG/DL (ref 5–40)
WBC NRBC COR # BLD AUTO: 4.11 10*3/MM3 (ref 3.4–10.8)

## 2024-10-23 PROCEDURE — 80050 GENERAL HEALTH PANEL: CPT

## 2024-10-23 PROCEDURE — 82306 VITAMIN D 25 HYDROXY: CPT

## 2024-10-23 PROCEDURE — 80061 LIPID PANEL: CPT

## 2024-10-23 PROCEDURE — 36415 COLL VENOUS BLD VENIPUNCTURE: CPT

## 2024-11-25 NOTE — PROGRESS NOTES
"Chief Complaint  Post-op Follow-up (5m post op)    Subjective          History of Present Illness  Valentina Anderson is a 44 y.o. female who presents to [facility] for Postoperative Follow-Up of Panniculectomy 7/12/24.    Pt presents today for 5m post op. Doing well with no issues or concerns.    Happy with early results.        History of Present Illness  The patient presents for evaluation of a scar.    She reports that the scar is slightly bothersome, particularly on one side. She expresses a desire for a revision procedure to be performed on the scar.       Allergies: Penicillins and Sulfa antibiotics  Allergies Reconciled.    Review of Systems   All review of system has been reviewed and it  is negative except the ones note above.     Objective     /71 (BP Location: Right arm, Patient Position: Sitting, Cuff Size: Adult)   Pulse 99   Ht 170.2 cm (67.01\")   Wt 70.8 kg (156 lb)   SpO2 97%   BMI 24.43 kg/m²     Body mass index is 24.43 kg/m².    Physical Exam  Abdomen: induration of scar with no signs of dehiscence or infection     Result Review :                Assessment and Plan      Diagnoses and all orders for this visit:    1. S/P panniculectomy (Primary)    2. Hypertrophic scar              Plan:  IOP- abdominal scar revision- 1h  16557 scar revision trunk       Scribed by Kavya Linda, acting as a scribe for Liliana Gallego MD, 09/04/24 15:22 EDT.  Liliana Gallego MD's signature on the note affirms that the note adequately documents the care provided.            Follow Up     No follow-ups on file.    Patient was given instructions and counseling regarding her condition. Please see specific information pulled into the AVS if appropriate.     Liliana Gallego MD  12/04/2024     Answers submitted by the patient for this visit:  Other (Submitted on 8/28/2024)  Please describe your symptoms.: Surgical follow up  Have you had these " symptoms before?: No  How long have you been having these symptoms?: Greater than 2 weeks  Primary Reason for Visit (Submitted on 8/28/2024)  What is the primary reason for your visit?: Other    Answers submitted by the patient for this visit:  Primary Reason for Visit (Submitted on 11/27/2024)  What is the primary reason for your visit?: Problem Not Listed

## 2024-11-27 ENCOUNTER — TELEPHONE (OUTPATIENT)
Dept: INTERNAL MEDICINE | Age: 44
End: 2024-11-27
Payer: COMMERCIAL

## 2024-11-27 RX ORDER — DULOXETIN HYDROCHLORIDE 30 MG/1
30 CAPSULE, DELAYED RELEASE ORAL DAILY
Qty: 60 CAPSULE | Refills: 0 | Status: SHIPPED | OUTPATIENT
Start: 2024-11-27

## 2024-11-27 RX ORDER — LEVOTHYROXINE SODIUM 75 UG/1
75 TABLET ORAL DAILY
Qty: 60 TABLET | Refills: 0 | Status: SHIPPED | OUTPATIENT
Start: 2024-11-27

## 2024-12-04 ENCOUNTER — OFFICE VISIT (OUTPATIENT)
Dept: PLASTIC SURGERY | Facility: CLINIC | Age: 44
End: 2024-12-04
Payer: COMMERCIAL

## 2024-12-04 ENCOUNTER — OFFICE VISIT (OUTPATIENT)
Dept: INTERNAL MEDICINE | Age: 44
End: 2024-12-04
Payer: COMMERCIAL

## 2024-12-04 VITALS
BODY MASS INDEX: 24.61 KG/M2 | OXYGEN SATURATION: 98 % | HEART RATE: 79 BPM | DIASTOLIC BLOOD PRESSURE: 60 MMHG | SYSTOLIC BLOOD PRESSURE: 110 MMHG | WEIGHT: 156.8 LBS | HEIGHT: 67 IN | TEMPERATURE: 98.6 F

## 2024-12-04 VITALS
WEIGHT: 156 LBS | SYSTOLIC BLOOD PRESSURE: 105 MMHG | HEART RATE: 99 BPM | DIASTOLIC BLOOD PRESSURE: 71 MMHG | HEIGHT: 67 IN | OXYGEN SATURATION: 97 % | BODY MASS INDEX: 24.48 KG/M2

## 2024-12-04 DIAGNOSIS — L91.0 HYPERTROPHIC SCAR: ICD-10-CM

## 2024-12-04 DIAGNOSIS — Z98.890 S/P PANNICULECTOMY: Primary | ICD-10-CM

## 2024-12-04 DIAGNOSIS — M77.01 EPICONDYLITIS ELBOW, MEDIAL, RIGHT: Primary | ICD-10-CM

## 2024-12-04 PROCEDURE — 99212 OFFICE O/P EST SF 10 MIN: CPT | Performed by: SURGERY

## 2024-12-04 PROCEDURE — 99214 OFFICE O/P EST MOD 30 MIN: CPT | Performed by: NURSE PRACTITIONER

## 2024-12-04 RX ORDER — PREDNISONE 10 MG/1
10 TABLET ORAL DAILY
Qty: 7 TABLET | Refills: 0 | Status: SHIPPED | OUTPATIENT
Start: 2024-12-04

## 2024-12-04 NOTE — PROGRESS NOTES
"Chief Complaint  Pain (Left elbow pain progressing over the last 6 month and interfering with daily activities)    Subjective      Valentina Anderson is a 44 y.o. female who presents to [facility] for complaints of right medial elbow pain.    History of Present Illness  The patient presents for evaluation of elbow pain.    She has been experiencing elbow pain for over six months, which she suspects may be due to localized tendinitis. Despite using Voltaren ointment twice daily for the past two weeks, the pain persists. She has also tried an NSAID, which was previously effective for her knee pain, but it did not alleviate her elbow discomfort. She has attempted to manage the pain with rest and ice, but these measures have not been successful. The pain is now interfering with her daily activities. She is left-handed but often uses her right hand for various tasks. She reports no history of injury to the area. She is open to trying low-dose steroids for pain management.  History of gastric sleeve take with food and monitor for signs and symptoms gastritis.        She saw Dr. Duncan today and had a scar revision under local anesthesia.         Objective   Vital Signs:   Vitals:    12/04/24 1554   BP: 110/60   BP Location: Left arm   Patient Position: Sitting   Cuff Size: Adult   Pulse: 79   Temp: 98.6 °F (37 °C)   TempSrc: Skin   SpO2: 98%   Weight: 71.1 kg (156 lb 12.8 oz)   Height: 170.2 cm (67.01\")   PainSc:   5   PainLoc: Elbow     Body mass index is 24.55 kg/m².    Wt Readings from Last 3 Encounters:   12/04/24 71.1 kg (156 lb 12.8 oz)   12/04/24 70.8 kg (156 lb)   10/18/24 72.1 kg (159 lb)     BP Readings from Last 3 Encounters:   12/04/24 110/60   12/04/24 105/71   10/18/24 122/82       Health Maintenance   Topic Date Due    TDAP/TD VACCINES (1 - Tdap) Never done    ANNUAL PHYSICAL  Never done    COVID-19 Vaccine (4 - 2024-25 season) 02/23/2025 (Originally 9/1/2024)    MAMMOGRAM "  10/22/2026    PAP SMEAR  09/04/2027    COLORECTAL CANCER SCREENING  11/21/2033    HEPATITIS C SCREENING  Completed    INFLUENZA VACCINE  Completed    Pneumococcal Vaccine 0-64  Aged Out       Physical Exam  Constitutional:       Appearance: Normal appearance.   HENT:      Head: Normocephalic.      Nose: Nose normal.      Mouth/Throat:      Mouth: Mucous membranes are moist.   Eyes:      Conjunctiva/sclera: Conjunctivae normal.      Pupils: Pupils are equal, round, and reactive to light.   Cardiovascular:      Rate and Rhythm: Normal rate and regular rhythm.   Pulmonary:      Effort: Pulmonary effort is normal.      Breath sounds: Normal breath sounds.   Abdominal:      General: Bowel sounds are normal.      Palpations: Abdomen is soft.   Musculoskeletal:         General: Normal range of motion.      Cervical back: Normal range of motion.   Skin:     General: Skin is warm and dry.   Neurological:      General: No focal deficit present.      Mental Status: She is alert and oriented to person, place, and time.   Psychiatric:         Mood and Affect: Mood normal.         Behavior: Behavior normal.         Thought Content: Thought content normal.          Physical Exam         Result Review :  The following data was reviewed by: VINCE Matthews on 12/04/2024:    Labs  Lab on 10/23/2024   Component Date Value Ref Range Status    25 Hydroxy, Vitamin D 10/23/2024 37.1  30.0 - 100.0 ng/ml Final    TSH 10/23/2024 2.010  0.270 - 4.200 uIU/mL Final    Total Cholesterol 10/23/2024 136  0 - 200 mg/dL Final    Triglycerides 10/23/2024 60  0 - 150 mg/dL Final    HDL Cholesterol 10/23/2024 57  40 - 60 mg/dL Final    LDL Cholesterol  10/23/2024 66  0 - 100 mg/dL Final    VLDL Cholesterol 10/23/2024 13  5 - 40 mg/dL Final    LDL/HDL Ratio 10/23/2024 1.18   Final    Glucose 10/23/2024 86  65 - 99 mg/dL Final    BUN 10/23/2024 10  6 - 20 mg/dL Final    Creatinine 10/23/2024 0.94  0.57 - 1.00 mg/dL Final    Sodium 10/23/2024  140  136 - 145 mmol/L Final    Potassium 10/23/2024 4.3  3.5 - 5.2 mmol/L Final    Chloride 10/23/2024 105  98 - 107 mmol/L Final    CO2 10/23/2024 26.2  22.0 - 29.0 mmol/L Final    Calcium 10/23/2024 9.1  8.6 - 10.5 mg/dL Final    Total Protein 10/23/2024 7.1  6.0 - 8.5 g/dL Final    Albumin 10/23/2024 3.9  3.5 - 5.2 g/dL Final    ALT (SGPT) 10/23/2024 10  1 - 33 U/L Final    AST (SGOT) 10/23/2024 18  1 - 32 U/L Final    Alkaline Phosphatase 10/23/2024 46  39 - 117 U/L Final    Total Bilirubin 10/23/2024 0.3  0.0 - 1.2 mg/dL Final    Globulin 10/23/2024 3.2  gm/dL Final    A/G Ratio 10/23/2024 1.2  g/dL Final    BUN/Creatinine Ratio 10/23/2024 10.6  7.0 - 25.0 Final    Anion Gap 10/23/2024 8.8  5.0 - 15.0 mmol/L Final    eGFR 10/23/2024 75.9  >60.0 mL/min/1.73 Final    WBC 10/23/2024 4.11  3.40 - 10.80 10*3/mm3 Final    RBC 10/23/2024 4.69  3.77 - 5.28 10*6/mm3 Final    Hemoglobin 10/23/2024 14.2  12.0 - 15.9 g/dL Final    Hematocrit 10/23/2024 42.9  34.0 - 46.6 % Final    MCV 10/23/2024 91.5  79.0 - 97.0 fL Final    MCH 10/23/2024 30.3  26.6 - 33.0 pg Final    MCHC 10/23/2024 33.1  31.5 - 35.7 g/dL Final    RDW 10/23/2024 12.6  12.3 - 15.4 % Final    RDW-SD 10/23/2024 41.7  37.0 - 54.0 fl Final    MPV 10/23/2024 9.3  6.0 - 12.0 fL Final    Platelets 10/23/2024 317  140 - 450 10*3/mm3 Final    Neutrophil % 10/23/2024 43.2  42.7 - 76.0 % Final    Lymphocyte % 10/23/2024 44.0  19.6 - 45.3 % Final    Monocyte % 10/23/2024 8.0  5.0 - 12.0 % Final    Eosinophil % 10/23/2024 3.4  0.3 - 6.2 % Final    Basophil % 10/23/2024 1.2  0.0 - 1.5 % Final    Immature Grans % 10/23/2024 0.2  0.0 - 0.5 % Final    Neutrophils, Absolute 10/23/2024 1.77  1.70 - 7.00 10*3/mm3 Final    Lymphocytes, Absolute 10/23/2024 1.81  0.70 - 3.10 10*3/mm3 Final    Monocytes, Absolute 10/23/2024 0.33  0.10 - 0.90 10*3/mm3 Final    Eosinophils, Absolute 10/23/2024 0.14  0.00 - 0.40 10*3/mm3 Final    Basophils, Absolute 10/23/2024 0.05  0.00 - 0.20  10*3/mm3 Final    Immature Grans, Absolute 10/23/2024 0.01  0.00 - 0.05 10*3/mm3 Final    nRBC 10/23/2024 0.0  0.0 - 0.2 /100 WBC Final        Imaging  Mammo Screening Digital Tomosynthesis Bilateral With CAD    Result Date: 10/22/2024  No mammographic evidence of malignancy.  Recommend annual screening mammography.  BI-RADS ASSESSMENT: BI-RADS 1. Negative.  Note:  It has been reported that there is approximately a 15% false negative rate in mammography.  Therefore, management of a palpable abnormality should not be deferred because of a negative mammogram.  Electronically Signed By-Silvana Lopez MD On:10/22/2024 4:56 PM        Results         Procedures         ASSESSMENT & PLAN  Diagnoses and all orders for this visit:    1. Epicondylitis elbow, medial, right (Primary)  -     XR Elbow 2 View Right; Future  -     predniSONE (DELTASONE) 10 MG tablet; Take 1 tablet by mouth Daily.  Dispense: 7 tablet; Refill: 0         Assessment & Plan  1. Medial epicondylitis.  The patient reports pain in the medial elbow region for over 6 months, which has not improved with NSAIDs, Voltaren ointment, icing, or rest. She was advised to use a counterforce band and apply heat or ice to the affected area. A prescription for prednisone 10 mg once daily for 7 days was provided, with instructions to take it with food. The use of Voltaren gel was suggested. She was instructed to monitor for signs of gastritis. An x-ray was ordered to further evaluate the condition.  She was instructed to consult physical therapy and get a counterforce band.  If the pain persists, a prescription for Ultram will be considered. She was also advised to consult with physical therapy for additional treatment options.         BMI is within normal parameters. No other follow-up for BMI required.           FOLLOW UP  Return if symptoms worsen or fail to improve.  Patient was given instructions and counseling regarding her condition or for health maintenance  advice. Please see specific information pulled into the AVS if appropriate.     Patient or patient representative verbalized consent for the use of Ambient Listening during the visit with  VINCE Matthews for chart documentation. 12/4/2024  16:20 EST    VINCE Matthews  12/04/24  16:20 EST

## 2024-12-04 NOTE — PATIENT INSTRUCTIONS
Have x-ray   use counterforce band  Use heat or ice  Take the steroids to take it only with food monitor for gastritis  May use the Voltaren gel  If pain continues to persist will call in Ultram call and let me know  Follow-up with physical therapy for consultation on this treatment plan

## 2024-12-06 ENCOUNTER — HOSPITAL ENCOUNTER (OUTPATIENT)
Dept: GENERAL RADIOLOGY | Facility: HOSPITAL | Age: 44
Discharge: HOME | End: 2024-12-06
Admitting: NURSE PRACTITIONER
Payer: COMMERCIAL

## 2024-12-06 DIAGNOSIS — M77.01 EPICONDYLITIS ELBOW, MEDIAL, RIGHT: ICD-10-CM

## 2024-12-06 PROCEDURE — 73070 X-RAY EXAM OF ELBOW: CPT

## 2024-12-10 PROBLEM — L91.0 HYPERTROPHIC SCAR: Status: ACTIVE | Noted: 2024-12-10

## 2025-01-14 RX ORDER — DULOXETIN HYDROCHLORIDE 30 MG/1
30 CAPSULE, DELAYED RELEASE ORAL DAILY
Qty: 60 CAPSULE | Refills: 0 | OUTPATIENT
Start: 2025-01-14

## 2025-01-14 RX ORDER — LEVOTHYROXINE SODIUM 75 UG/1
75 TABLET ORAL DAILY
Qty: 60 TABLET | Refills: 0 | OUTPATIENT
Start: 2025-01-14

## 2025-01-15 RX ORDER — LEVOTHYROXINE SODIUM 75 UG/1
75 TABLET ORAL DAILY
Qty: 60 TABLET | Refills: 0 | Status: SHIPPED | OUTPATIENT
Start: 2025-01-15

## 2025-01-15 RX ORDER — DULOXETIN HYDROCHLORIDE 30 MG/1
30 CAPSULE, DELAYED RELEASE ORAL DAILY
Qty: 60 CAPSULE | Refills: 0 | Status: SHIPPED | OUTPATIENT
Start: 2025-01-15

## 2025-01-27 NOTE — PROGRESS NOTES
"Chief Complaint  Procedure (IOP)    Subjective              History of Present Illness  Valentina Anderson is a 45 y.o. female who presents to [facility] as a in office procedure for abdominal scar revision.    Pt presents today for IOP.    History of Present Illness    Patient sp panniculectomy developed scar that is depressed on the suture site         Allergies: Penicillins and Sulfa antibiotics  Allergies Reconciled.    Review of Systems   All review of system has been reviewed and it  is negative except the ones note above.     Objective     /70 (BP Location: Right arm, Patient Position: Sitting, Cuff Size: Adult)   Pulse 87   Ht 170.2 cm (67.01\")   SpO2 98%   BMI 25.37 kg/m²     Body mass index is 25.37 kg/m².    Physical Exam  Physical Exam          Cardiovascular: Normal rate    Pulmonary/Chest: Effort normal    Face:     Result Review :       Scar revision    Date/Time: 2/6/2025 11:41 AM    Performed by: Liliana Gallego MD  Authorized by: Liliana Gallego MD  Preparation: Patient was prepped and draped in the usual sterile fashion.  Local anesthesia used: yes    Anesthesia:  Local anesthesia used: yes  Local Anesthetic: lidocaine 1% with epinephrine  Anesthetic total: 30 mL    Sedation:  Patient sedated: no    Patient tolerance: patient tolerated the procedure well with no immediate complications  Comments: Abd scar revision           Excision Procedure: Consent obtained. Local injected to site, Lidocaine 1% with epi mixed with Marcaine.  Site prepped with Betadine and Chloraprep  in sterile fashion. Site draped in sterile fashion. I dissected down through skin and subcutaneous tissue completely around  abdomen scar. Established hemostasis with direct pressure. Site was thoroughly irrigated. Site closed with 3 layers of  3-0 vicryl in. Site cleaned with sterile normal saline. Steri Strips applied. The patient tolerated the procedure " well with no immediate complications.   Excision size: 45x 2 cm        Assessment and Plan      Diagnoses and all orders for this visit:    1. Hypertrophic scar (Primary)    Other orders  -     Scar revision        Plan:  Assessment & Plan       Keep incision clean, dry, and intact.  RTC           Scribed by Kavya Linda, acting as a scribe for Liliana Gallego MD, 02/06/25 11:44 EST.  Liliana Gallego MD's signature on the note affirms that the note adequately documents the care provided.           Follow Up     No follow-ups on file.    Patient was given instructions and counseling regarding her condition. Please see specific information pulled into the AVS if appropriate.     Liliana Gallego MD  02/06/2025

## 2025-01-28 ENCOUNTER — LAB (OUTPATIENT)
Facility: HOSPITAL | Age: 45
End: 2025-01-28
Payer: COMMERCIAL

## 2025-01-28 LAB — VIT B12 BLD-MCNC: 431 PG/ML (ref 211–946)

## 2025-01-28 PROCEDURE — 82607 VITAMIN B-12: CPT | Performed by: PSYCHIATRY & NEUROLOGY

## 2025-01-29 ENCOUNTER — OFFICE VISIT (OUTPATIENT)
Dept: BARIATRICS/WEIGHT MGMT | Facility: CLINIC | Age: 45
End: 2025-01-29
Payer: COMMERCIAL

## 2025-01-29 VITALS
WEIGHT: 162 LBS | BODY MASS INDEX: 25.43 KG/M2 | HEIGHT: 67 IN | DIASTOLIC BLOOD PRESSURE: 72 MMHG | SYSTOLIC BLOOD PRESSURE: 109 MMHG | TEMPERATURE: 97.3 F | HEART RATE: 82 BPM

## 2025-01-29 DIAGNOSIS — Z98.890 S/P PANNICULECTOMY: ICD-10-CM

## 2025-01-29 DIAGNOSIS — F32.A ANXIETY AND DEPRESSION: ICD-10-CM

## 2025-01-29 DIAGNOSIS — F41.9 ANXIETY AND DEPRESSION: ICD-10-CM

## 2025-01-29 DIAGNOSIS — E66.3 OVERWEIGHT (BMI 25.0-29.9): Primary | ICD-10-CM

## 2025-01-29 DIAGNOSIS — Z98.84 S/P LAPAROSCOPIC SLEEVE GASTRECTOMY: ICD-10-CM

## 2025-01-29 DIAGNOSIS — E28.2 PCOS (POLYCYSTIC OVARIAN SYNDROME): ICD-10-CM

## 2025-01-29 PROBLEM — R19.4 ALTERED BOWEL HABITS: Status: RESOLVED | Noted: 2023-11-17 | Resolved: 2025-01-29

## 2025-01-29 PROBLEM — Z71.3 DIETARY COUNSELING: Status: RESOLVED | Noted: 2020-11-10 | Resolved: 2025-01-29

## 2025-01-29 RX ORDER — SEMAGLUTIDE 2.4 MG/.75ML
2.4 INJECTION, SOLUTION SUBCUTANEOUS WEEKLY
Qty: 9 ML | Refills: 1 | Status: SHIPPED | OUTPATIENT
Start: 2025-01-29 | End: 2025-01-29

## 2025-01-29 RX ORDER — SEMAGLUTIDE 2.4 MG/.75ML
2.4 INJECTION, SOLUTION SUBCUTANEOUS WEEKLY
Qty: 9 ML | Refills: 3 | Status: SHIPPED | OUTPATIENT
Start: 2025-01-29

## 2025-01-29 NOTE — PROGRESS NOTES
[facility]  [address]  [facility] [id number] [phone]  [address]  [facility] [id number] [phone]  Dept: [phone]  1/29/2025      Valentina Anderson.  [id number]  [id number]  1980  female      Chief Complaint   Patient presents with    Follow-up     Follow up sleeve/RX       BH Post-Op Bariatric Surgery:   Valentina Anderson is status post Laparoscopic Sleeve/PEH procedure, performed on 11/4/20     HPI:       Today's weight is 73.5 kg (162 lb) pounds, today's BMI is Body mass index is 25.37 kg/m²., she has a gain of 3 pounds since the last visit, 52lb since starting wegovy, and 141 pounds since surgery. The patient reports a decreased portion size and loss of appetite.    Valentina Anderson denies nausea, vomiting, reflux and reports that she is doing well     Diet and Exercise: Diet history reviewed and discussed with the patient. Weight loss/gains to date discussed with the patient. The patient states they are eating 90 grams of protein per day. She reports eating 3 meals per day, a typical portion size of 1/2 cup, eating 1 snacks per day, drinking 5-6 or more 8-oz. glasses of water per day, no carbonated beverage consumption and exercising regularly- walking and phillip 1-3 days per week    Supplements: pureform MTV    Review of Systems   Constitutional:  Positive for appetite change. Negative for fatigue and unexpected weight change.   HENT: Negative.     Eyes: Negative.    Respiratory: Negative.     Cardiovascular: Negative.  Negative for leg swelling.   Gastrointestinal:  Negative for abdominal distention, abdominal pain, constipation, diarrhea, nausea and vomiting.   Genitourinary:  Negative for difficulty urinating, frequency and urgency.   Musculoskeletal:  Negative for back pain.   Skin: Negative.    Psychiatric/Behavioral: Negative.     All other systems reviewed and are negative.      Patient Active  PAL Rx is stable. Problem List   Diagnosis    Chiari malformation type I    Dizziness    New daily persistent headache    History of brain surgery    Degeneration of intervertebral disc at C6-C7 level    Bilateral occipital neuralgia    Chronic fatigue    Multiple joint pain    Anxiety and depression    Hypothyroid    PCOS (polycystic ovarian syndrome)    Endometriosis    Multiple gastric polyps    S/P laparoscopic sleeve gastrectomy    Dietary counseling    Abnormal electrocardiogram    Vitamin D deficiency    Overweight (BMI 25.0-29.9)    Constipation    Diarrhea    Hypertrophic scar       Past Medical History:   Diagnosis Date    Chiari I malformation     REPAIRED    DDD (degenerative disc disease), cervical     FULL ROM    Depression 2000    situational due to death of family member    Disease of thyroid gland     Dizziness     Endometriosis     Fibromyalgia, primary Nov 2022    GERD (gastroesophageal reflux disease)     Headache     Heart palpitations     OCC PVC, BENIGN CARDIAC W/U    Hiatal hernia     Hypothyroidism     Low back pain right lumbar pain    Migraine Aug 2019    Peripheral neuropathy July 2019    Right greater than left arm/hands    PONV (postoperative nausea and vomiting)        The following portions of the patient's history were reviewed and updated as appropriate: allergies, current medications, past family history, past medical history, past social history, past surgical history, and problem list.    Vitals:    01/29/25 1533   BP: 109/72   Pulse: 82   Temp: 97.3 °F (36.3 °C)       Physical Exam  Vitals and nursing note reviewed.   Constitutional:       Appearance: She is well-developed.   Neck:      Thyroid: No thyromegaly.   Cardiovascular:      Rate and Rhythm: Normal rate.   Pulmonary:      Effort: Pulmonary effort is normal. No respiratory distress.   Abdominal:      Palpations: Abdomen is soft.   Musculoskeletal:         General: No tenderness.   Skin:     General: Skin is warm and dry.   Neurological:       Mental Status: She is alert.   Psychiatric:         Behavior: Behavior normal.         Assessment:   Post-op, the patient is doing well.     Encounter Diagnoses   Name Primary?    Overweight (BMI 25.0-29.9) Yes    S/P laparoscopic sleeve gastrectomy     PCOS (polycystic ovarian syndrome)     S/P panniculectomy        Plan:   Continue wegovy at 2.4mg weekly  Encouraged patient to be sure to get plenty of lean protein per day through small frequent meals all with a protein source.   Activity restrictions: none.   Recommended patient be sure to get at least 70 grams of protein per day by eating small, frequent meals all with high lean protein choices. Be sure to limit/cut back on daily carbohydrate intake. Discussed with the patient the recommended amount of water per day to intake- half of body weight in ounces. Reviewed vitamin requirements. Be sure to do routine exercise, 150 minutes per week minimum, including both cardio and strength training.     Instructions / Recommendations: dietary counseling recommended, recommended a daily protein intake of  grams, vitamin supplement(s) recommended, recommended exercising at least 150 minutes per week, behavior modifications recommended and instructed to call the office for concerns, questions, or problems.     The patient was instructed to follow up in 12 months .    Total time spent during this encounter today was 30 minutes

## 2025-02-06 ENCOUNTER — PROCEDURE VISIT (OUTPATIENT)
Dept: PLASTIC SURGERY | Facility: CLINIC | Age: 45
End: 2025-02-06
Payer: COMMERCIAL

## 2025-02-06 VITALS
BODY MASS INDEX: 25.37 KG/M2 | HEIGHT: 67 IN | OXYGEN SATURATION: 98 % | SYSTOLIC BLOOD PRESSURE: 105 MMHG | HEART RATE: 87 BPM | DIASTOLIC BLOOD PRESSURE: 70 MMHG

## 2025-02-06 DIAGNOSIS — L91.0 HYPERTROPHIC SCAR: Primary | ICD-10-CM

## 2025-02-12 ENCOUNTER — OFFICE VISIT (OUTPATIENT)
Age: 45
End: 2025-02-12
Payer: COMMERCIAL

## 2025-02-12 VITALS
BODY MASS INDEX: 25.43 KG/M2 | DIASTOLIC BLOOD PRESSURE: 78 MMHG | HEART RATE: 85 BPM | OXYGEN SATURATION: 99 % | SYSTOLIC BLOOD PRESSURE: 120 MMHG | WEIGHT: 162 LBS | HEIGHT: 67 IN

## 2025-02-12 DIAGNOSIS — N63.42 SUBAREOLAR MASS OF LEFT BREAST: Primary | ICD-10-CM

## 2025-02-12 PROCEDURE — 99213 OFFICE O/P EST LOW 20 MIN: CPT | Performed by: NURSE PRACTITIONER

## 2025-02-12 NOTE — PROGRESS NOTES
"Chief Complaint  Breast Mass (Pt felt lump in l breat size of marble not tender )    Subjective      Valentina Anderson is a 45 y.o. female who presents to [facility]     History of Present Illness  The patient presents for evaluation of a left breast lump.    She reports a palpable mass in her left breast, described as marble size  in consistency. She has never  engaged in breastfeeding and does not report any associated pain, redness, warmth, or radiation of symptoms. There is no axillary involvement, nipple discharge, rash, or dimpling. She does not recall any trauma to the breast. Her last mammogram was conducted in 10/2024, yielding normal results. She admits to inconsistent self-breast examinations, with the most recent likely performed at the end of 11/2024 or beginning of 12/2024. Which was normal. She has a history of a fibroadenoma in the right breast, never issues left. She typically wears underwire bras and consumes coffee daily but no excess caffeine drinks. She is currently on birth control.     Supplemental Information      SOCIAL HISTORY      FAMILY HISTORY  She refutes a family history of breast cancer. Positive for heart disease and stroke.    ALLERGIES  The patient has no known allergies.         Objective   Vital Signs:   Vitals:    02/12/25 1549   BP: 120/78   Pulse: 85   SpO2: 99%   Weight: 73.5 kg (162 lb)   Height: 170.2 cm (67.01\")     Body mass index is 25.37 kg/m².    Wt Readings from Last 3 Encounters:   02/12/25 73.5 kg (162 lb)   01/29/25 73.5 kg (162 lb)   12/04/24 71.1 kg (156 lb 12.8 oz)     BP Readings from Last 3 Encounters:   02/12/25 120/78   02/06/25 105/70   01/29/25 109/72       Health Maintenance   Topic Date Due    TDAP/TD VACCINES (1 - Tdap) Never done    ANNUAL PHYSICAL  Never done    COVID-19 Vaccine (4 - 2024-25 season) 02/23/2025 (Originally 9/1/2024)    BMI FOLLOWUP  01/29/2026    MAMMOGRAM  10/22/2026    PAP SMEAR  09/04/2027    " COLORECTAL CANCER SCREENING  11/21/2033    HEPATITIS C SCREENING  Completed    INFLUENZA VACCINE  Completed    Pneumococcal Vaccine 0-49  Aged Out       Physical Exam  Constitutional:       Appearance: Normal appearance.   HENT:      Head: Normocephalic.      Nose: Nose normal.      Mouth/Throat:      Mouth: Mucous membranes are moist.   Eyes:      Conjunctiva/sclera: Conjunctivae normal.      Pupils: Pupils are equal, round, and reactive to light.   Cardiovascular:      Rate and Rhythm: Normal rate and regular rhythm.   Pulmonary:      Effort: Pulmonary effort is normal.      Breath sounds: Normal breath sounds.   Abdominal:      General: Bowel sounds are normal.      Palpations: Abdomen is soft.   Musculoskeletal:         General: Normal range of motion.      Cervical back: Normal range of motion.   Skin:     General: Skin is warm and dry.   Neurological:      General: No focal deficit present.      Mental Status: She is alert and oriented to person, place, and time.   Psychiatric:         Mood and Affect: Mood normal.         Behavior: Behavior normal.         Thought Content: Thought content normal.          Physical Exam  A 2 cm movable mass is present in the left breast.       Result Review :  The following data was reviewed by: VINCE Matthews on 02/12/2025:    Labs  No visits with results within 2 Month(s) from this visit.   Latest known visit with results is:   Lab on 10/23/2024   Component Date Value Ref Range Status    25 Hydroxy, Vitamin D 10/23/2024 37.1  30.0 - 100.0 ng/ml Final    TSH 10/23/2024 2.010  0.270 - 4.200 uIU/mL Final    Total Cholesterol 10/23/2024 136  0 - 200 mg/dL Final    Triglycerides 10/23/2024 60  0 - 150 mg/dL Final    HDL Cholesterol 10/23/2024 57  40 - 60 mg/dL Final    LDL Cholesterol  10/23/2024 66  0 - 100 mg/dL Final    VLDL Cholesterol 10/23/2024 13  5 - 40 mg/dL Final    LDL/HDL Ratio 10/23/2024 1.18   Final    Glucose 10/23/2024 86  65 - 99 mg/dL Final    BUN  10/23/2024 10  6 - 20 mg/dL Final    Creatinine 10/23/2024 0.94  0.57 - 1.00 mg/dL Final    Sodium 10/23/2024 140  136 - 145 mmol/L Final    Potassium 10/23/2024 4.3  3.5 - 5.2 mmol/L Final    Chloride 10/23/2024 105  98 - 107 mmol/L Final    CO2 10/23/2024 26.2  22.0 - 29.0 mmol/L Final    Calcium 10/23/2024 9.1  8.6 - 10.5 mg/dL Final    Total Protein 10/23/2024 7.1  6.0 - 8.5 g/dL Final    Albumin 10/23/2024 3.9  3.5 - 5.2 g/dL Final    ALT (SGPT) 10/23/2024 10  1 - 33 U/L Final    AST (SGOT) 10/23/2024 18  1 - 32 U/L Final    Alkaline Phosphatase 10/23/2024 46  39 - 117 U/L Final    Total Bilirubin 10/23/2024 0.3  0.0 - 1.2 mg/dL Final    Globulin 10/23/2024 3.2  gm/dL Final    A/G Ratio 10/23/2024 1.2  g/dL Final    BUN/Creatinine Ratio 10/23/2024 10.6  7.0 - 25.0 Final    Anion Gap 10/23/2024 8.8  5.0 - 15.0 mmol/L Final    eGFR 10/23/2024 75.9  >60.0 mL/min/1.73 Final    WBC 10/23/2024 4.11  3.40 - 10.80 10*3/mm3 Final    RBC 10/23/2024 4.69  3.77 - 5.28 10*6/mm3 Final    Hemoglobin 10/23/2024 14.2  12.0 - 15.9 g/dL Final    Hematocrit 10/23/2024 42.9  34.0 - 46.6 % Final    MCV 10/23/2024 91.5  79.0 - 97.0 fL Final    MCH 10/23/2024 30.3  26.6 - 33.0 pg Final    MCHC 10/23/2024 33.1  31.5 - 35.7 g/dL Final    RDW 10/23/2024 12.6  12.3 - 15.4 % Final    RDW-SD 10/23/2024 41.7  37.0 - 54.0 fl Final    MPV 10/23/2024 9.3  6.0 - 12.0 fL Final    Platelets 10/23/2024 317  140 - 450 10*3/mm3 Final    Neutrophil % 10/23/2024 43.2  42.7 - 76.0 % Final    Lymphocyte % 10/23/2024 44.0  19.6 - 45.3 % Final    Monocyte % 10/23/2024 8.0  5.0 - 12.0 % Final    Eosinophil % 10/23/2024 3.4  0.3 - 6.2 % Final    Basophil % 10/23/2024 1.2  0.0 - 1.5 % Final    Immature Grans % 10/23/2024 0.2  0.0 - 0.5 % Final    Neutrophils, Absolute 10/23/2024 1.77  1.70 - 7.00 10*3/mm3 Final    Lymphocytes, Absolute 10/23/2024 1.81  0.70 - 3.10 10*3/mm3 Final    Monocytes, Absolute 10/23/2024 0.33  0.10 - 0.90 10*3/mm3 Final     Eosinophils, Absolute 10/23/2024 0.14  0.00 - 0.40 10*3/mm3 Final    Basophils, Absolute 10/23/2024 0.05  0.00 - 0.20 10*3/mm3 Final    Immature Grans, Absolute 10/23/2024 0.01  0.00 - 0.05 10*3/mm3 Final    nRBC 10/23/2024 0.0  0.0 - 0.2 /100 WBC Final        Imaging  XR Elbow 2 View Right    Result Date: 12/9/2024  Impression: 1.No acute osseous abnormality of the right elbow. No elbow joint effusion. 2.Degenerative joint disease at the ulnohumeral joint. Electronically Signed: Ravi Quezadaelor  12/9/2024 5:52 PM EST  Workstation ID: [id number]    Mammo Screening Digital Tomosynthesis Bilateral With CAD    Result Date: 10/22/2024  No mammographic evidence of malignancy.  Recommend annual screening mammography.  BI-RADS ASSESSMENT: BI-RADS 1. Negative.  Note:  It has been reported that there is approximately a 15% false negative rate in mammography.  Therefore, management of a palpable abnormality should not be deferred because of a negative mammogram.  Electronically Signed By-Silvana Lopez MD On:10/22/2024 4:56 PM        Results  Imaging  Mammogram in October was clear.       Procedures         ASSESSMENT & PLAN  Diagnoses and all orders for this visit:    1. Subareolar mass of left breast (Primary)         Assessment & Plan  1. Left breast lump.  The patient reports discovering a marble-sized lump in her left breast around the end of November or beginning of December. She has no history of breastfeeding, no associated pain unless pressed hard, and no other symptoms such as redness, heat, or discharge. A physical examination confirmed the presence of a 2 cm movable lump. She has been advised to limit caffeine intake and avoid manipulating the lump to prevent potential irritation. A diagnostic mammogram and ultrasound have been ordered to further evaluate the lump. She has been instructed to schedule these tests as soon as possible and to follow up with the results.     PROCEDURE                    FOLLOW UP  No  follow-ups on file.  Patient was given instructions and counseling regarding her condition or for health maintenance advice. Please see specific information pulled into the AVS if appropriate.     Patient or patient representative verbalized consent for the use of Ambient Listening during the visit with  VINCE Matthews for chart documentation. 2/12/2025  21:52 EST    VINCE Matthews  02/12/25  21:50 EST

## 2025-02-13 NOTE — PROGRESS NOTES
"Chief Complaint  Post-op Follow-up (3 week post op)    Subjective          History of Present Illness  Valentina Anderson is a 45 y.o. female who presents to [facility] for Postoperative Follow-Up of Panniculectomy 7/12/24.  Scar revision of abdomen on 2/6/25.    Patient is 3 week post op. Denies pain. Patient is happy with results. She states steri strips fell off last week. Does have a spitting stitch but wants to leave it for right now since it's not bothering her.      Allergies: Penicillins and Sulfa antibiotics  Allergies Reconciled.    Review of Systems   All review of system has been reviewed and it  is negative except the ones note above.     Objective     /80 (BP Location: Left arm, Patient Position: Sitting, Cuff Size: Adult)   Pulse 83   Ht 170.2 cm (67.01\")   Wt 75.8 kg (167 lb)   SpO2 97%   BMI 26.15 kg/m²     Body mass index is 26.15 kg/m².    Physical Exam  Abdomen: incision is clean, dry and intact. 1 spitting suture on the Right side. No erythema.       Result Review :                Assessment and Plan      Diagnoses and all orders for this visit:    1. Postoperative follow-up (Primary)      Plan:  Photos taken today.  Patient will f/u PRN.           Follow Up     Return if symptoms worsen or fail to improve.    Patient was given instructions and counseling regarding her condition. Please see specific information pulled into the AVS if appropriate.     Alena Roque PA-C  02/27/2025     Answers submitted by the patient for this visit:  Other (Submitted on 8/28/2024)  Please describe your symptoms.: Surgical follow up  Have you had these symptoms before?: No  How long have you been having these symptoms?: Greater than 2 weeks  Primary Reason for Visit (Submitted on 8/28/2024)  What is the primary reason for your visit?: Other    Answers submitted by the patient for this visit:  Primary Reason for Visit (Submitted on 11/27/2024)  What is " the primary reason for your visit?: Problem Not Listed

## 2025-02-19 ENCOUNTER — HOSPITAL ENCOUNTER (OUTPATIENT)
Dept: MAMMOGRAPHY | Facility: HOSPITAL | Age: 45
Discharge: HOME | End: 2025-02-19
Payer: COMMERCIAL

## 2025-02-19 ENCOUNTER — HOSPITAL ENCOUNTER (OUTPATIENT)
Dept: ULTRASOUND IMAGING | Facility: HOSPITAL | Age: 45
Discharge: HOME | End: 2025-02-19
Payer: COMMERCIAL

## 2025-02-19 DIAGNOSIS — N63.42 SUBAREOLAR MASS OF LEFT BREAST: ICD-10-CM

## 2025-02-19 PROCEDURE — 76642 ULTRASOUND BREAST LIMITED: CPT

## 2025-02-19 PROCEDURE — G0279 TOMOSYNTHESIS, MAMMO: HCPCS

## 2025-02-19 PROCEDURE — 77065 DX MAMMO INCL CAD UNI: CPT

## 2025-02-19 NOTE — PROGRESS NOTES
Can you let her know that her mammogram was not diagnostic she does have very dense breast he said he could palpate the area but did not show suspicion it was really nondiagnostic so therefore he recommends that she have a surgical consult.  I just learned Dr. Healy is leaving and she is the one we usually refer to for any breast masses there is a Dr. Ibarra in [place] that is with [facility] I use a lot do you have someone you want to use particular

## 2025-02-20 DIAGNOSIS — N63.42 SUBAREOLAR MASS OF LEFT BREAST: Primary | ICD-10-CM

## 2025-02-21 ENCOUNTER — TELEPHONE (OUTPATIENT)
Age: 45
End: 2025-02-21
Payer: COMMERCIAL

## 2025-02-21 ENCOUNTER — TELEPHONE (OUTPATIENT)
Dept: SURGERY | Facility: CLINIC | Age: 45
End: 2025-02-21
Payer: COMMERCIAL

## 2025-02-21 DIAGNOSIS — N63.42 SUBAREOLAR MASS OF LEFT BREAST: Primary | ICD-10-CM

## 2025-02-21 NOTE — TELEPHONE ENCOUNTER
----- Message from Yohana STUART sent at 2/21/2025 10:09 AM EST -----    ----- Message -----  From: Anna Carias APRN  Sent: 2/19/2025  12:50 PM EST  To: Yohana Mg MA    Can you let her know that her mammogram was not diagnostic she does have very dense breast he said he could palpate the area but did not show suspicion it was really nondiagnostic so therefore he recommends that she have a surgical consult.  I just l  earned Dr. Healy is leaving and she is the one we usually refer to for any breast masses there is a Dr. Ibarra in [place] that is with [facility] I use a lot do you have someone you want to use particular

## 2025-02-21 NOTE — TELEPHONE ENCOUNTER
New patient chart prepped for Dr. Ibarra review and scheduling (referral for subareolar breast mass)

## 2025-02-25 ENCOUNTER — PREP FOR SURGERY (OUTPATIENT)
Dept: OTHER | Facility: HOSPITAL | Age: 45
End: 2025-02-25
Payer: COMMERCIAL

## 2025-02-25 ENCOUNTER — TELEPHONE (OUTPATIENT)
Dept: NEUROLOGY | Facility: CLINIC | Age: 45
End: 2025-02-25
Payer: COMMERCIAL

## 2025-02-25 DIAGNOSIS — N63.42 SUBAREOLAR MASS OF LEFT BREAST: Primary | ICD-10-CM

## 2025-02-25 NOTE — TELEPHONE ENCOUNTER
LVM in regards to getting patient scheduled after a request of cancellation of original appt. Informed patient to call back.

## 2025-02-25 NOTE — H&P
Chief Complaint:  No chief complaint on file.    Primary Care Provider: Anna Carias APRN      History of Present Illness  Valentina Anderson is a 45 y.o. female for referral for a breast mass.  The patient noticed a mass at about the 12 o'clock position of her left breast near the nipple areolar complex about a month or 2 ago.  Patient was evaluated with diagnostic ultrasound and diagnostic mammogram.  The breast tissue is dense somewhat limiting the imaging but there was no imaging finding concerning for malignancy.  The patient was able to get an appointment with her breast surgeon in [place] but not until August.  She is too nervous to wait until then to have something done.  Routine screening mammogram was done about one year ago and there was no concern finding.  Takes a GLP-1 medication.    Allergies: Penicillins and Sulfa antibiotics    Home Medicines:  List is available in the EMR    Past Medical History:    Chiari I malformation    REPAIRED    DDD (degenerative disc disease), cervical    FULL ROM    Depression    situational due to death of family member    Disease of thyroid gland    Dizziness    Endometriosis    Fibromyalgia, primary    GERD (gastroesophageal reflux disease)    Headache    Heart palpitations    OCC PVC, BENIGN CARDIAC W/U    Hiatal hernia    Hypothyroidism    Low back pain    Migraine    Peripheral neuropathy    Right greater than left arm/hands    PONV (postoperative nausea and vomiting)        Past Surgical History:    ANKLE SURGERY    X2    BARIATRIC SURGERY    Gastic sleeve    BRAIN SURGERY    CERVICAL CHIARI DECOMPRESSION POSTERIOR    Procedure: CERVICAL CHIARI DECOMPRESSION POSTERIOR, posterior fossa decompression, C1, C2 laminectomy and expansive duraplasty.;  Surgeon: Jim Santos MD;  Location: [facility];  Service:      SECTION    CHOLECYSTECTOMY    COLONOSCOPY    COLONOSCOPY    Procedure: COLONOSCOPY WITH RANDOM COLON BIOPSIES;  Surgeon: Usman  Major Poe MD;  Location: [facility] ENDOSCOPY;  Service: Gastroenterology;  Laterality: N/A;  HEMORRHOIDS    COSMETIC SURGERY    Panniculectomy    CRANIOTOMY    Oct 2016/CHIARI REPAIR    DIAGNOSTIC LAPAROSCOPY    X2    ENDOSCOPY    ENDOSCOPY    Procedure: ESOPHAGOGASTRODUODENOSCOPY WITH BIOPSY;  Surgeon: Marc Ramirez Jr., MD;  Location: [facility] ENDOSCOPY;  Service: General;  Laterality: N/A;  PRE-GERD, H/O GASTRIC POLYPS  POST- GASTRIC POLYPS, HIATAL HERNIA, GASTRITIS    EYE SURGERY    PRK    GASTRIC SLEEVE LAPAROSCOPIC    Procedure: GASTRIC SLEEVE LAPAROSCOPIC With PARAESOPHAGEAL Hernia Repair;  Surgeon: Marc Ramirez Jr., MD;  Location: [facility] OR OSC;  Service: Bariatric;  Laterality: N/A;    KNEE SURGERY    X6, before age 19    LAMINECTOMY    C1 laminectomy CHIARI REPAIR    PANNICULECTOMY    Procedure: PANNICULECTOMY;  Surgeon: Liliana Gallego MD;  Location: [facility] OR [facility];  Service: Plastics;  Laterality: N/A;    UPPER GASTROINTESTINAL ENDOSCOPY       Family History:   Family History   Problem Relation Age of Onset    Hypertension Mother     Obesity Mother     Cardiomyopathy Father     Obesity Father     Hypertension Father     Heart disease Father     Sleep apnea Father     Early death Father         idiopathic cardiomyopathy    Obesity Brother     Sleep apnea Brother     Drug abuse Brother     Mental illness Brother         Bipolar    Mental retardation Maternal Aunt     Parkinsonism Paternal Uncle     Obesity Maternal Grandmother     Stroke Maternal Grandmother     Arthritis Maternal Grandmother     Mental illness Maternal Grandmother         Bipolar    Hypertension Paternal Grandmother     Heart attack Paternal Grandmother     Heart disease Paternal Grandmother         CABG    Parkinsonism Paternal Grandmother     Vision loss Paternal Grandmother     Hypertension Paternal Grandfather     Heart attack Paternal Grandfather     Heart disease Paternal Grandfather     Anxiety disorder Brother      Asthma Son     Ila Hyperthermia Neg Hx     Colon cancer Neg Hx         Social History:  Social History     Tobacco Use    Smoking status: Former     Current packs/day: 0.00     Average packs/day: 1 pack/day for 5.0 years (5.0 ttl pk-yrs)     Types: Cigarettes     Start date: 2002     Quit date: 2007     Years since quittin.6     Passive exposure: Past    Smokeless tobacco: Never    Tobacco comments:     QUIT    Substance Use Topics    Alcohol use: Yes     Alcohol/week: 3.0 standard drinks of alcohol     Types: 3 Cans of beer per week     Comment: social drinker       Objective     Vital Signs:  There were no vitals taken for this visit.  Respiratory:  breathing not labored, respiratory effort appears normal  Cardiovascular:  heart regular rate  Skin and subcutaneous tissue:  Palpable mass that is about 1.5 to 2 cm in diameter.  The mass is located at about the 12 o'clock position at the areolar margin.  The mass is mobile and nontender.  Skin overlying the mass is normal-appearing.       Assessment:  Left breast mass - palpable    Plan:  Excisional biopsy of left breast mass    Discussion: Indications, options, risks, benefits, and expected outcomes of planned surgery were discussed with the patient and she agrees to proceed.  Patient was instructed to stop taking her GLP-1 medication 8 days before the day the surgery will be performed.    Gerardo Oreilly MD  2025    Electronically signed by Gerardo Oreilly MD, 25, 2:20 PM EST.

## 2025-02-25 NOTE — H&P (VIEW-ONLY)
Chief Complaint:  No chief complaint on file.    Primary Care Provider: Anna Carias APRN      History of Present Illness  Valentina Anderson is a 45 y.o. female for referral for a breast mass.  The patient noticed a mass at about the 12 o'clock position of her left breast near the nipple areolar complex about a month or 2 ago.  Patient was evaluated with diagnostic ultrasound and diagnostic mammogram.  The breast tissue is dense somewhat limiting the imaging but there was no imaging finding concerning for malignancy.  The patient was able to get an appointment with her breast surgeon in [place] but not until August.  She is too nervous to wait until then to have something done.  Routine screening mammogram was done about one year ago and there was no concern finding.  Takes a GLP-1 medication.    Allergies: Penicillins and Sulfa antibiotics    Home Medicines:  List is available in the EMR    Past Medical History:    Chiari I malformation    REPAIRED    DDD (degenerative disc disease), cervical    FULL ROM    Depression    situational due to death of family member    Disease of thyroid gland    Dizziness    Endometriosis    Fibromyalgia, primary    GERD (gastroesophageal reflux disease)    Headache    Heart palpitations    OCC PVC, BENIGN CARDIAC W/U    Hiatal hernia    Hypothyroidism    Low back pain    Migraine    Peripheral neuropathy    Right greater than left arm/hands    PONV (postoperative nausea and vomiting)        Past Surgical History:    ANKLE SURGERY    X2    BARIATRIC SURGERY    Gastic sleeve    BRAIN SURGERY    CERVICAL CHIARI DECOMPRESSION POSTERIOR    Procedure: CERVICAL CHIARI DECOMPRESSION POSTERIOR, posterior fossa decompression, C1, C2 laminectomy and expansive duraplasty.;  Surgeon: Jim Santos MD;  Location: [facility];  Service:      SECTION    CHOLECYSTECTOMY    COLONOSCOPY    COLONOSCOPY    Procedure: COLONOSCOPY WITH RANDOM COLON BIOPSIES;  Surgeon: Usman  Major Poe MD;  Location: [facility] ENDOSCOPY;  Service: Gastroenterology;  Laterality: N/A;  HEMORRHOIDS    COSMETIC SURGERY    Panniculectomy    CRANIOTOMY    Oct 2016/CHIARI REPAIR    DIAGNOSTIC LAPAROSCOPY    X2    ENDOSCOPY    ENDOSCOPY    Procedure: ESOPHAGOGASTRODUODENOSCOPY WITH BIOPSY;  Surgeon: Marc Ramirez Jr., MD;  Location: [facility] ENDOSCOPY;  Service: General;  Laterality: N/A;  PRE-GERD, H/O GASTRIC POLYPS  POST- GASTRIC POLYPS, HIATAL HERNIA, GASTRITIS    EYE SURGERY    PRK    GASTRIC SLEEVE LAPAROSCOPIC    Procedure: GASTRIC SLEEVE LAPAROSCOPIC With PARAESOPHAGEAL Hernia Repair;  Surgeon: Marc Ramirez Jr., MD;  Location: [facility] OR [facility]SC;  Service: Bariatric;  Laterality: N/A;    KNEE SURGERY    X6, before age 19    LAMINECTOMY    C1 laminectomy CHIARI REPAIR    PANNICULECTOMY    Procedure: PANNICULECTOMY;  Surgeon: Liliana Gallego MD;  Location: [facility] OR [facility];  Service: Plastics;  Laterality: N/A;    UPPER GASTROINTESTINAL ENDOSCOPY       Family History:   Family History   Problem Relation Age of Onset    Hypertension Mother     Obesity Mother     Cardiomyopathy Father     Obesity Father     Hypertension Father     Heart disease Father     Sleep apnea Father     Early death Father         idiopathic cardiomyopathy    Obesity Brother     Sleep apnea Brother     Drug abuse Brother     Mental illness Brother         Bipolar    Mental retardation Maternal Aunt     Parkinsonism Paternal Uncle     Obesity Maternal Grandmother     Stroke Maternal Grandmother     Arthritis Maternal Grandmother     Mental illness Maternal Grandmother         Bipolar    Hypertension Paternal Grandmother     Heart attack Paternal Grandmother     Heart disease Paternal Grandmother         CABG    Parkinsonism Paternal Grandmother     Vision loss Paternal Grandmother     Hypertension Paternal Grandfather     Heart attack Paternal Grandfather     Heart disease Paternal Grandfather     Anxiety disorder Brother      Asthma Son     Ila Hyperthermia Neg Hx     Colon cancer Neg Hx         Social History:  Social History     Tobacco Use    Smoking status: Former     Current packs/day: 0.00     Average packs/day: 1 pack/day for 5.0 years (5.0 ttl pk-yrs)     Types: Cigarettes     Start date: 2002     Quit date: 2007     Years since quittin.6     Passive exposure: Past    Smokeless tobacco: Never    Tobacco comments:     QUIT    Substance Use Topics    Alcohol use: Yes     Alcohol/week: 3.0 standard drinks of alcohol     Types: 3 Cans of beer per week     Comment: social drinker       Objective     Vital Signs:  There were no vitals taken for this visit.  Respiratory:  breathing not labored, respiratory effort appears normal  Cardiovascular:  heart regular rate  Skin and subcutaneous tissue:  Palpable mass that is about 1.5 to 2 cm in diameter.  The mass is located at about the 12 o'clock position at the areolar margin.  The mass is mobile and nontender.  Skin overlying the mass is normal-appearing.       Assessment:  Left breast mass - palpable    Plan:  Excisional biopsy of left breast mass    Discussion: Indications, options, risks, benefits, and expected outcomes of planned surgery were discussed with the patient and she agrees to proceed.  Patient was instructed to stop taking her GLP-1 medication 8 days before the day the surgery will be performed.    Gerardo Oreilly MD  2025    Electronically signed by Gerardo Oreilly MD, 25, 2:20 PM EST.

## 2025-02-27 ENCOUNTER — OFFICE VISIT (OUTPATIENT)
Dept: PLASTIC SURGERY | Facility: CLINIC | Age: 45
End: 2025-02-27
Payer: COMMERCIAL

## 2025-02-27 VITALS
DIASTOLIC BLOOD PRESSURE: 80 MMHG | SYSTOLIC BLOOD PRESSURE: 117 MMHG | WEIGHT: 167 LBS | HEART RATE: 83 BPM | OXYGEN SATURATION: 97 % | HEIGHT: 67 IN | BODY MASS INDEX: 26.21 KG/M2

## 2025-02-27 DIAGNOSIS — Z09 POSTOPERATIVE FOLLOW-UP: Primary | ICD-10-CM

## 2025-03-10 RX ORDER — MAGNESIUM 200 MG
TABLET ORAL
COMMUNITY

## 2025-03-10 NOTE — PRE-PROCEDURE INSTRUCTIONS
PATIENT INSTRUCTED TO BE:    - NOTHING TO EAT AFTER MIDNIGHT OR CHEW, EXCEPT CAN HAVE SIPS OF WATER WITH MEDICATIONS OR CLEAR LIQUIDS 2 HOURS PRIOR TO SURGERY ARRIVAL TIME , NO MORE THAN 8 OZ. (NOTHING RED)     - TO HOLD ALL VITAMINS, SUPPLEMENTS, NSAIDS FOR ONE WEEK PRIOR TO THEIR SURGICAL PROCEDURE    - DO NOT TAKE __________WEGOVY 3/3/25 LAST DOSE____________ 7 DAYS PRIOR TO PROCEDURE PER ANESTHESIA RECOMMENDATIONS/INSTRUCTIONS     - INSTRUCTED PT TO USE SURGICAL SOAP 1 TIME THE NIGHT PRIOR TO SURGERY ___________ OR THE AM OF SURGERY _____________   USE THE SOAP FROM NECK TO TOES, AVOID THEIR FACE, HAIR, AND PRIVATE PARTS. IF USE THE SOAP THE NIGHT PRIOR TO SURGERY, CHANGE BED LINENS AND NO PETS IN THE BED.     INSTRUCTED NO LOTIONS, JEWELRY, PIERCINGS,  NAIL POLISH, OR DEODORANT DAY OF SURGERY    - IF DIABETIC, CHECK BLOOD GLUCOSE IF LESS THAN 70 OR HAVING SYMPTOMS CALL THE PREOP AREA FOR INSTRUCTIONS ON AM OF [phone])    -INSTRUCTED TO TAKE THE FOLLOWING MEDICATIONS THE DAY OF SURGERY WITH SIPS OF WATER:     LO LOESTRIN  DULOXETINE, LEVOTHYROXIN      - DO NOT BRING ANY MEDICATIONS WITH YOU TO THE HOSPITAL THE DAY OF SURGERY, EXCEPT IF USE INHALERS. BRING INHALERS DAY OF SURGERY       - BRING CPAP OR BIPAP TO THE HOSPITAL ONLY IF YOU ARE SPENDING THE NIGHT    - DO NOT SMOKE OR VAPE 24 HOURS PRIOR TO PROCEDURE PER ANESTHESIA REQUEST     -MAKE SURE YOU HAVE A RIDE HOME OR SOMEONE TO STAY WITH YOU THE DAY OF THE PROCEDURE AFTER YOU GO HOME     - FOLLOW ANY OTHER INSTRUCTIONS GIVEN TO YOU BY YOUR SURGEON'S OFFICE.     - DAY OF SURGERY ____________,[facility] ( [address]), YOU CAN  PARK OR SELF PARK. ENTER THE PAVILION THRU MAIN ENTRANCE, TAKE ELEVATORS TO THE FIRST FLOOR, CHECK IN AT THE DESK FOR REGISTRATION/ SURGERY. OR AT [facility] ([facility]),  PARK IN THE OPEN LOT, COME TO ENTRANCE C/ [address], FIRST FLOOR, CHECK IN AT THE DESK FOR  REGISTRATION/ SURGERY      - YOU WILL RECEIVE A PHONE CALL THE DAY PRIOR TO SURGERY BETWEEN 1PM AND 4 PM WITH ARRIVAL TIME, IF YOUR SURGERY IS ON A MONDAY YOU WILL RECEIVE A CALL THE FRIDAY PRIOR TO SURGERY DATE    - BRING CASH OR CREDIT CARD FOR COPAYMENT OF MEDICATIONS AFTER SURGERY IF YOU USE THE HOSPITAL PHARMACY (MEDS TO BED)    - PREADMISSION TESTING NURSE [phone] IF HAVE ANY QUESTIONS     -PATIENT PROVIDED THE NUMBER FOR PREOP SURGICAL DEPT IF HAD QUESTIONS AFTER HOURS PRIOR TO SURGERY ([phone]).  INFORMED PT IF NO ANSWER, LEAVE A MESSAGE AND SOMEONE WILL RETURN THEIR CALL       PATIENT VERBALIZED UNDERSTANDING

## 2025-03-13 ENCOUNTER — ANESTHESIA EVENT (OUTPATIENT)
Dept: PERIOP | Facility: HOSPITAL | Age: 45
End: 2025-03-13
Payer: COMMERCIAL

## 2025-03-13 ENCOUNTER — ANESTHESIA (OUTPATIENT)
Dept: PERIOP | Facility: HOSPITAL | Age: 45
End: 2025-03-13
Payer: COMMERCIAL

## 2025-03-13 ENCOUNTER — HOSPITAL ENCOUNTER (OUTPATIENT)
Facility: HOSPITAL | Age: 45
Setting detail: HOSPITAL OUTPATIENT SURGERY
Discharge: HOME | End: 2025-03-13
Attending: SURGERY | Admitting: SURGERY
Payer: COMMERCIAL

## 2025-03-13 VITALS
DIASTOLIC BLOOD PRESSURE: 71 MMHG | HEART RATE: 83 BPM | SYSTOLIC BLOOD PRESSURE: 109 MMHG | OXYGEN SATURATION: 98 % | HEIGHT: 67 IN | RESPIRATION RATE: 18 BRPM | BODY MASS INDEX: 25.33 KG/M2 | WEIGHT: 161.38 LBS | TEMPERATURE: 98.1 F

## 2025-03-13 DIAGNOSIS — N63.42 SUBAREOLAR MASS OF LEFT BREAST: ICD-10-CM

## 2025-03-13 LAB — B-HCG UR QL: NEGATIVE

## 2025-03-13 PROCEDURE — 25010000002 BUPIVACAINE (PF) 0.25 % SOLUTION: Performed by: SURGERY

## 2025-03-13 PROCEDURE — 25010000002 LIDOCAINE PF 2% 2 % SOLUTION: Performed by: NURSE ANESTHETIST, CERTIFIED REGISTERED

## 2025-03-13 PROCEDURE — 25010000002 ONDANSETRON PER 1 MG: Performed by: NURSE ANESTHETIST, CERTIFIED REGISTERED

## 2025-03-13 PROCEDURE — 25010000002 PHENYLEPHRINE 10 MG/ML SOLUTION: Performed by: NURSE ANESTHETIST, CERTIFIED REGISTERED

## 2025-03-13 PROCEDURE — 19120 REMOVAL OF BREAST LESION: CPT | Performed by: SURGERY

## 2025-03-13 PROCEDURE — 25010000002 DEXAMETHASONE PER 1 MG: Performed by: NURSE ANESTHETIST, CERTIFIED REGISTERED

## 2025-03-13 PROCEDURE — 25810000003 LACTATED RINGERS PER 1000 ML: Performed by: ANESTHESIOLOGY

## 2025-03-13 PROCEDURE — 81025 URINE PREGNANCY TEST: CPT | Performed by: SURGERY

## 2025-03-13 PROCEDURE — 25010000002 MIDAZOLAM PER 1MG: Performed by: ANESTHESIOLOGY

## 2025-03-13 PROCEDURE — 25010000002 PROPOFOL 10 MG/ML EMULSION: Performed by: NURSE ANESTHETIST, CERTIFIED REGISTERED

## 2025-03-13 PROCEDURE — 88305 TISSUE EXAM BY PATHOLOGIST: CPT | Performed by: SURGERY

## 2025-03-13 PROCEDURE — 25010000002 FENTANYL CITRATE (PF) 50 MCG/ML SOLUTION: Performed by: NURSE ANESTHETIST, CERTIFIED REGISTERED

## 2025-03-13 RX ORDER — HYDROCODONE BITARTRATE AND ACETAMINOPHEN 5; 325 MG/1; MG/1
1 TABLET ORAL EVERY 6 HOURS PRN
Qty: 5 TABLET | Refills: 0 | Status: SHIPPED | OUTPATIENT
Start: 2025-03-13

## 2025-03-13 RX ORDER — DEXAMETHASONE SODIUM PHOSPHATE 4 MG/ML
INJECTION, SOLUTION INTRA-ARTICULAR; INTRALESIONAL; INTRAMUSCULAR; INTRAVENOUS; SOFT TISSUE AS NEEDED
Status: DISCONTINUED | OUTPATIENT
Start: 2025-03-13 | End: 2025-03-13 | Stop reason: SURG

## 2025-03-13 RX ORDER — ONDANSETRON 2 MG/ML
INJECTION INTRAMUSCULAR; INTRAVENOUS AS NEEDED
Status: DISCONTINUED | OUTPATIENT
Start: 2025-03-13 | End: 2025-03-13 | Stop reason: SURG

## 2025-03-13 RX ORDER — ONDANSETRON 2 MG/ML
4 INJECTION INTRAMUSCULAR; INTRAVENOUS ONCE AS NEEDED
Status: DISCONTINUED | OUTPATIENT
Start: 2025-03-13 | End: 2025-03-13 | Stop reason: HOSPADM

## 2025-03-13 RX ORDER — SODIUM CHLORIDE, SODIUM LACTATE, POTASSIUM CHLORIDE, CALCIUM CHLORIDE 600; 310; 30; 20 MG/100ML; MG/100ML; MG/100ML; MG/100ML
9 INJECTION, SOLUTION INTRAVENOUS CONTINUOUS PRN
Status: DISCONTINUED | OUTPATIENT
Start: 2025-03-13 | End: 2025-03-13 | Stop reason: HOSPADM

## 2025-03-13 RX ORDER — PHENYLEPHRINE HYDROCHLORIDE 10 MG/ML
INJECTION INTRAVENOUS AS NEEDED
Status: DISCONTINUED | OUTPATIENT
Start: 2025-03-13 | End: 2025-03-13 | Stop reason: SURG

## 2025-03-13 RX ORDER — LIDOCAINE HYDROCHLORIDE 20 MG/ML
INJECTION, SOLUTION EPIDURAL; INFILTRATION; INTRACAUDAL; PERINEURAL AS NEEDED
Status: DISCONTINUED | OUTPATIENT
Start: 2025-03-13 | End: 2025-03-13 | Stop reason: SURG

## 2025-03-13 RX ORDER — MIDAZOLAM HYDROCHLORIDE 2 MG/2ML
2 INJECTION, SOLUTION INTRAMUSCULAR; INTRAVENOUS ONCE
Status: COMPLETED | OUTPATIENT
Start: 2025-03-13 | End: 2025-03-13

## 2025-03-13 RX ORDER — OXYCODONE HYDROCHLORIDE 5 MG/1
5 TABLET ORAL
Refills: 0 | Status: COMPLETED | OUTPATIENT
Start: 2025-03-13 | End: 2025-03-13

## 2025-03-13 RX ORDER — PROPOFOL 10 MG/ML
VIAL (ML) INTRAVENOUS AS NEEDED
Status: DISCONTINUED | OUTPATIENT
Start: 2025-03-13 | End: 2025-03-13 | Stop reason: SURG

## 2025-03-13 RX ORDER — PROMETHAZINE HYDROCHLORIDE 12.5 MG/1
25 TABLET ORAL ONCE AS NEEDED
Status: DISCONTINUED | OUTPATIENT
Start: 2025-03-13 | End: 2025-03-13 | Stop reason: HOSPADM

## 2025-03-13 RX ORDER — BUPIVACAINE HYDROCHLORIDE 2.5 MG/ML
INJECTION, SOLUTION EPIDURAL; INFILTRATION; INTRACAUDAL AS NEEDED
Status: DISCONTINUED | OUTPATIENT
Start: 2025-03-13 | End: 2025-03-13 | Stop reason: HOSPADM

## 2025-03-13 RX ORDER — DEXMEDETOMIDINE HYDROCHLORIDE 100 UG/ML
INJECTION, SOLUTION INTRAVENOUS AS NEEDED
Status: DISCONTINUED | OUTPATIENT
Start: 2025-03-13 | End: 2025-03-13 | Stop reason: SURG

## 2025-03-13 RX ORDER — SCOPOLAMINE 1 MG/3D
1 PATCH, EXTENDED RELEASE TRANSDERMAL ONCE
Status: DISCONTINUED | OUTPATIENT
Start: 2025-03-13 | End: 2025-03-13 | Stop reason: HOSPADM

## 2025-03-13 RX ORDER — FENTANYL CITRATE 50 UG/ML
INJECTION, SOLUTION INTRAMUSCULAR; INTRAVENOUS AS NEEDED
Status: DISCONTINUED | OUTPATIENT
Start: 2025-03-13 | End: 2025-03-13 | Stop reason: SURG

## 2025-03-13 RX ORDER — ACETAMINOPHEN 500 MG
1000 TABLET ORAL ONCE
Status: COMPLETED | OUTPATIENT
Start: 2025-03-13 | End: 2025-03-13

## 2025-03-13 RX ORDER — DULOXETIN HYDROCHLORIDE 30 MG/1
30 CAPSULE, DELAYED RELEASE ORAL DAILY
Qty: 60 CAPSULE | Refills: 0 | Status: SHIPPED | OUTPATIENT
Start: 2025-03-13

## 2025-03-13 RX ORDER — PROMETHAZINE HYDROCHLORIDE 25 MG/1
25 SUPPOSITORY RECTAL ONCE AS NEEDED
Status: DISCONTINUED | OUTPATIENT
Start: 2025-03-13 | End: 2025-03-13 | Stop reason: HOSPADM

## 2025-03-13 RX ORDER — LEVOTHYROXINE SODIUM 75 UG/1
75 TABLET ORAL DAILY
Qty: 60 TABLET | Refills: 0 | Status: SHIPPED | OUTPATIENT
Start: 2025-03-13

## 2025-03-13 RX ADMIN — SCOLOPAMINE TRANSDERMAL SYSTEM 1 PATCH: 1 PATCH, EXTENDED RELEASE TRANSDERMAL at 13:45

## 2025-03-13 RX ADMIN — LIDOCAINE HYDROCHLORIDE 100 MG: 20 INJECTION, SOLUTION INTRAVENOUS at 13:54

## 2025-03-13 RX ADMIN — SODIUM CHLORIDE, SODIUM LACTATE, POTASSIUM CHLORIDE, AND CALCIUM CHLORIDE 9 ML/HR: .6; .31; .03; .02 INJECTION, SOLUTION INTRAVENOUS at 13:44

## 2025-03-13 RX ADMIN — ONDANSETRON 4 MG: 2 INJECTION INTRAMUSCULAR; INTRAVENOUS at 13:59

## 2025-03-13 RX ADMIN — PHENYLEPHRINE HYDROCHLORIDE 100 MCG: 10 INJECTION INTRAVENOUS at 14:01

## 2025-03-13 RX ADMIN — MIDAZOLAM HYDROCHLORIDE 2 MG: 1 INJECTION, SOLUTION INTRAMUSCULAR; INTRAVENOUS at 13:44

## 2025-03-13 RX ADMIN — DEXAMETHASONE SODIUM PHOSPHATE 4 MG: 4 INJECTION, SOLUTION INTRAMUSCULAR; INTRAVENOUS at 13:59

## 2025-03-13 RX ADMIN — OXYCODONE HYDROCHLORIDE 5 MG: 5 TABLET ORAL at 15:14

## 2025-03-13 RX ADMIN — OXYCODONE HYDROCHLORIDE 5 MG: 5 TABLET ORAL at 14:48

## 2025-03-13 RX ADMIN — ACETAMINOPHEN 1000 MG: 500 TABLET ORAL at 13:44

## 2025-03-13 RX ADMIN — DEXMEDETOMIDINE 10 MCG: 100 INJECTION, SOLUTION, CONCENTRATE INTRAVENOUS at 13:53

## 2025-03-13 RX ADMIN — FENTANYL CITRATE 50 MCG: 50 INJECTION, SOLUTION INTRAMUSCULAR; INTRAVENOUS at 13:54

## 2025-03-13 RX ADMIN — PROPOFOL 200 MG: 10 INJECTION, EMULSION INTRAVENOUS at 13:54

## 2025-03-13 RX ADMIN — FENTANYL CITRATE 50 MCG: 50 INJECTION, SOLUTION INTRAMUSCULAR; INTRAVENOUS at 14:31

## 2025-03-13 RX ADMIN — PHENYLEPHRINE HYDROCHLORIDE 100 MCG: 10 INJECTION INTRAVENOUS at 13:59

## 2025-03-13 RX ADMIN — PHENYLEPHRINE HYDROCHLORIDE 100 MCG: 10 INJECTION INTRAVENOUS at 14:05

## 2025-03-13 RX ADMIN — PHENYLEPHRINE HYDROCHLORIDE 100 MCG: 10 INJECTION INTRAVENOUS at 14:04

## 2025-03-13 NOTE — TELEPHONE ENCOUNTER
Morenita are you still seeing this patient?   I am confused about medication stating that she would have to get from her new PCP next time, just wanted to clarify. Thanks.

## 2025-03-13 NOTE — OP NOTE
Operative Report    Patient Name:  Valentina Anderson  YOB: 1980    Date of Surgery:  3/13/2025     Pre-op Diagnosis:   Left breast mass       Post-op Diagnosis:   Left breast mass    Procedure:  EXCISIONAL BIOPSY LEFT BREAST MASS    Staff:  Surgeon(s):  Gerardo Oreilly MD    Assistant: Durga Padilla CSA    Anesthesia: General    Estimated Blood Loss: minimal    Complications:  None    Drains:  None    Packing:  None    Implants:    Nothing was implanted during the procedure    Specimen:          Specimens       ID Source Type Tests Collected By Collected At Frozen?    A Breast, Left Tissue TISSUE PATHOLOGY EXAM   eGrardo Oreilly MD 3/13/25 [id number]     Description: left breast mass             Indications:  See my preop H&P note.     Findings:   Approximately 2 cm diameter section of left breast tissue removed that encompassed the palpable mass.  Gross characteristics are consistent with fibroma.    Description of Procedure: The patient was taken to the operating placed supine on the procedure table.  Timeout was formed.  Anesthesia was administered.  The left breast was prepped and draped in usual fashion.  Local anesthesia was injected into the skin and subcutaneous tissue at the location of the palpable left breast mass.  A curvilinear incision was made just above the left areolar margin from about the 1:00 to 3:00 location of the left breast.  The incision was deepened into the subcutaneous tissue and the palpable mass was circumferentially dissected and removed.  See above findings section.  Adequate hemostasis.  Wound was closed with buried dissolvable suture followed by skin adhesive.  No complications.  Minimal blood loss.  Patient was transported to the recovery area in stable condition.    Assistant: Durga Padilla CSA  was responsible for performing the following activities: Retraction, Suction, Closing, and Placing Dressing and his skilled assistance was necessary for the  success of this case.    Gerardo Oreilly MD     Date: 3/13/2025  Time: 14:33 EDT    Electronically signed by Gerardo Oreilly MD, 03/13/25, 2:33 PM EDT.

## 2025-03-13 NOTE — DISCHARGE INSTRUCTIONS
Dr. Oreilly's Instructions          DIET  Resume your regular diet.     ACTIVITY & RETURN TO WORK  You are excused from work for two weeks but may return anytime before then should you feel able to do so.  No jogging, running, or any activity that would cause lots of breast movement for the next three weeks.  After three weeks, you have no activity restrictions.     WOUND CARE & SHOWERING/BATHING  Your incision is covered with a plastic type material that will flake off on its own in the next few weeks.  If the plastic type material has not flaked off on its own by 2 weeks after your surgery then use tweezers to pull it off.  You have sutures in your incision but they are placed below the level of the skin and they will slowly dissolve (they do not need to be removed).  The skin around your incision will likely have some bruising.  This is normal.  You can shower beginning tomorrow but wait two weeks after your surgery before taking any tub baths.       HOME MEDICATIONS  Resume all of your normal home medications.     PAIN CONTROL  You will receive a narcotic pain medicine prescription before you are discharged home.  Be sure to take the narcotic pain medication with some food so as not to upset your stomach.  Do not drive while you are taking the prescription pain medication.  Take Tylenol or Motrin for mild pain.     BOWEL MOVEMENTS  It is not unusual for narcotic pain medications to cause constipation.  Also, the medications and anesthesia you received for your surgery can have a constipating effect.  To help avoid constipation, drink at least four eight-ounce glasses of water each day and use over-the-counter laxatives/stool softeners (dulcolax, milk of magnesia, senokot, etc.).  I recommend drinking the aforementioned amount of water daily and taking 30 ml of milk of magnesia two times each day while you are using the prescribed narcotic pain medication.  If your bowel movements become too loose or too  frequent, then simply stop following these recommendations unless you start to feel constipated.     FOLLOW-UP VISIT & QUESTIONS/CONCERNS  Call Dr. Oreilly´s office at [phone] and schedule a follow-up appointment for about three weeks after your surgery date.  However, if you are doing fine and don´t have any concerns then simply cancel the follow-up appointment.   Dr. Oreilly will contact you once he has the pathology result.  Should you have any questions or concerns, please call Dr. Oreilly´s office.                DISCHARGE INSTRUCTIONS  [] Breast Biopsy  [] Lumpectomy  [] Lymph Node Dissection           For your surgery you had:  General anesthesia (you may have a sore throat for the first 24 hours)  IV sedation  Local anesthesia  Monitored anesthesia care  You have received a medicated patch for nausea prevention today (behind your ear). It is recommended that you remove it 24-48 hours post-operatively. It must be removed within 72 hours.   You have received an anesthesia medication today that can cause hormonal forms of birth control to be ineffective. You should use a different form of birth control (to prevent pregnancy) for 7 days.   You may experience dizziness, drowsiness, or light-headedness for several hours following surgery/procedure.  Do not stay alone today or tonight.  Limit your activity for 24 hours.  You should not drive, operate machinery, drink alcohol, or sign legally binding documents for 24 hours or while you are taking pain medication.  Resume your diet slowly.  Follow whatever special dietary instructions you may have been given by your doctor.  Last dose of pain medication was given at:   .  NOTIFY YOUR DOCTOR IF YOU EXPERIENCE ANY OF THE FOLLOWING:  Temperature greater than 101 degrees Fahrenheit  Shaking Chills  Redness or excessive drainage from incision  Nausea, vomiting and/or pain that is not controlled by prescribed medications  Increase in bleeding or bleeding that is  excessive  Unable to urinate in 6 hours after surgery  If unable to reach your doctor, please go to the closest Emergency Room   .  You may shower :  [x]tomorrow  [] other    Ice pack for 24-48 hours.  Wear a bra for support.  Do not do any heavy lifting.  After your surgery you may notice some bruising.  This is normal.  Medications per physician instructions as indicated on Discharge Medication Information Sheet.  You should see   for follow-up care   on   .  Phone number:       SPECIAL INSTRUCTIONS:

## 2025-03-13 NOTE — ANESTHESIA PREPROCEDURE EVALUATION
Anesthesia Evaluation     Patient summary reviewed and Nursing notes reviewed   history of anesthetic complications:  PONV  NPO Solid Status: > 8 hours  NPO Liquid Status: > 2 hours           Airway   Mallampati: II  TM distance: >3 FB  Neck ROM: full  No difficulty expected  Dental      Pulmonary - negative pulmonary ROS and normal exam    breath sounds clear to auscultation  Cardiovascular - negative cardio ROS and normal exam  Exercise tolerance: good (4-7 METS)    Rhythm: regular  Rate: normal        Neuro/Psych  (+) headaches, numbness, psychiatric history Anxiety and Depression  GI/Hepatic/Renal/Endo    (+) hiatal hernia, thyroid problem     Musculoskeletal     (+) myalgias  Abdominal    Substance History      OB/GYN          Other   arthritis,     ROS/Med Hx Other: PAT Nursing Notes unavailable.                     Anesthesia Plan    ASA 2     general     (Patient understands anesthesia not responsible for dental damage.)  intravenous induction     Anesthetic plan, risks, benefits, and alternatives have been provided, discussed and informed consent has been obtained with: patient.    Plan discussed with CRNA.        CODE STATUS:

## 2025-03-13 NOTE — ANESTHESIA POSTPROCEDURE EVALUATION
Patient: Valentina Anderson    Procedure Summary       Date: 03/13/25 Room / Location: [facility] OR  / [facility]    Anesthesia Start: 1348 Anesthesia Stop: 1440    Procedure: EXCISION BREAST MASS:  Excise left breast mass (Left: Breast) Diagnosis:       Subareolar mass of left breast      (Subareolar mass of left breast [N63.42])    Surgeons: Gerardo Oreilly MD Provider: Dutch Broussard MD    Anesthesia Type: general ASA Status: 2            Anesthesia Type: general    Vitals  Vitals Value Taken Time   /71 03/13/25 15:35   Temp 36.6 °C (97.8 °F) 03/13/25 14:38   Pulse 83 03/13/25 15:36   Resp 16 03/13/25 15:05   SpO2 98 % 03/13/25 15:36   Vitals shown include unfiled device data.        Post Anesthesia Care and Evaluation    Patient location during evaluation: bedside  Patient participation: complete - patient participated  Level of consciousness: awake  Pain management: adequate    Airway patency: patent  PONV Status: none  Cardiovascular status: acceptable and stable  Respiratory status: acceptable  Hydration status: acceptable

## 2025-04-07 ENCOUNTER — OFFICE VISIT (OUTPATIENT)
Dept: SURGERY | Facility: CLINIC | Age: 45
End: 2025-04-07
Payer: COMMERCIAL

## 2025-04-07 VITALS — WEIGHT: 164 LBS | HEIGHT: 67 IN | BODY MASS INDEX: 25.74 KG/M2 | TEMPERATURE: 98.8 F

## 2025-04-07 DIAGNOSIS — Z09 ENCOUNTER FOR FOLLOW-UP: Primary | ICD-10-CM

## 2025-04-07 PROCEDURE — 99024 POSTOP FOLLOW-UP VISIT: CPT | Performed by: SURGERY

## 2025-04-07 NOTE — PROGRESS NOTES
Patient is here for follow-up after I removed a palpable left breast mass on 3/13/2025.  Path = Features compatible with lymphocytic/diabetic mastopathy in the appropriate clinical context.    Patient is doing well and has no complaints or concerns.  Pathology result was discussed.  Incision is healing fine.    My assessment is the patient is recovering satisfactory.  No further treatment or evaluation is necessary besides routine annual breast exam and mammogram and patient will have this done via her OB/GYN/primary care provider.  She can see me PRN.

## 2025-04-14 RX ORDER — DULOXETIN HYDROCHLORIDE 30 MG/1
30 CAPSULE, DELAYED RELEASE ORAL DAILY
Qty: 60 CAPSULE | Refills: 0 | Status: SHIPPED | OUTPATIENT
Start: 2025-04-14

## 2025-04-14 RX ORDER — LEVOTHYROXINE SODIUM 75 UG/1
75 TABLET ORAL DAILY
Qty: 60 TABLET | Refills: 0 | Status: SHIPPED | OUTPATIENT
Start: 2025-04-14

## 2025-05-09 ENCOUNTER — OFFICE VISIT (OUTPATIENT)
Age: 45
End: 2025-05-09
Payer: COMMERCIAL

## 2025-05-09 VITALS
HEIGHT: 67 IN | DIASTOLIC BLOOD PRESSURE: 68 MMHG | HEART RATE: 86 BPM | WEIGHT: 166.7 LBS | SYSTOLIC BLOOD PRESSURE: 100 MMHG | OXYGEN SATURATION: 98 % | BODY MASS INDEX: 26.16 KG/M2

## 2025-05-09 DIAGNOSIS — M25.50 MULTIPLE JOINT PAIN: ICD-10-CM

## 2025-05-09 DIAGNOSIS — G93.5 CHIARI MALFORMATION TYPE I: ICD-10-CM

## 2025-05-09 DIAGNOSIS — T16.2XXA FOREIGN BODY OF LEFT EAR, INITIAL ENCOUNTER: Primary | ICD-10-CM

## 2025-05-09 DIAGNOSIS — N63.42 SUBAREOLAR MASS OF LEFT BREAST: ICD-10-CM

## 2025-05-09 DIAGNOSIS — E55.9 VITAMIN D DEFICIENCY: ICD-10-CM

## 2025-05-09 RX ORDER — NEOMYCIN SULFATE, POLYMYXIN B SULFATE, HYDROCORTISONE 3.5; 10000; 1 MG/ML; [USP'U]/ML; MG/ML
4 SOLUTION/ DROPS AURICULAR (OTIC) 3 TIMES DAILY
Qty: 10 ML | Refills: 0 | Status: SHIPPED | OUTPATIENT
Start: 2025-05-09

## 2025-05-09 NOTE — PROGRESS NOTES
Chief Complaint  Earache (L ear pain for about 4 weeks )    Subjective      Valentina Anderson is a 45 y.o. female who presents to [facility]     History of Present Illness  The patient presents for evaluation of left ear pain.    She reports experiencing pressure, popping, and clicking sensations in her left ear, accompanied by sharp pain during nasal exhalation. She suspects the presence of fluid in the ear. She has been using Flonase for the past week and Zyrtec, but these have not alleviated her symptoms. She recalls a previous incident where a hair was lodged in her ear, causing similar discomfort. She does not report any associated symptoms such as sore throat, swollen lymph nodes, or fever. She also reports no exposure to water in the ear. She has been advised against the use of Q-tips. She had a severe vertigo episode when her ear was flushed previously.    She had a breast lump removed by Dr. Choudhury, which was diagnosed as lymphocytic mastopathy. This condition is often seen in people with autoimmune diseases, which she has, but no specific cause has been identified. The rheumatologist mentioned it could be related to GLP-1 usage. Dr. Choudhury advised that unless new symptoms arise, there is no need for concern. She was recommended to resume yearly mammograms. She experiences some stiffness at the incision site, possibly due to scar tissue, but otherwise feels fine.    She is tolerating her medications well, including Wegovy, without any adverse effects such as bowel issues or vomiting. Her weight has increased by 10 pounds, which she attributes to steroid use for an elbow condition in 12/2024 and increased beer consumption. Despite the weight gain, she feels good and continues to wear the same clothes.    PAST SURGICAL HISTORY:  Breast lump removal (lymphocytic mastopathy)    SOCIAL HISTORY  She admits to increasing her beer consumption.         Objective   Vital Signs:  "  Vitals:    05/09/25 1421   BP: 100/68   Pulse: 86   SpO2: 98%   Weight: 75.6 kg (166 lb 11.2 oz)   Height: 170.2 cm (67.01\")     Body mass index is 26.1 kg/m².    Wt Readings from Last 3 Encounters:   05/09/25 75.6 kg (166 lb 11.2 oz)   04/07/25 74.4 kg (164 lb)   03/13/25 73.2 kg (161 lb 6 oz)     BP Readings from Last 3 Encounters:   05/09/25 100/68   03/13/25 109/71   02/27/25 117/80     Health Maintenance   Topic Date Due    TDAP/TD VACCINES (1 - Tdap) Never done    ANNUAL PHYSICAL  Never done    COVID-19 Vaccine (4 - 2024-25 season) 09/01/2024    INFLUENZA VACCINE  07/01/2025    MAMMOGRAM  02/19/2027    PAP SMEAR  09/04/2027    COLORECTAL CANCER SCREENING  11/21/2033    HEPATITIS C SCREENING  Completed    Pneumococcal Vaccine 0-49  Aged Out       Past Medical History:   Diagnosis Date    Allergic rhinitis     Ankle sprain 2009    right    Arthritis     left knee and right ankle    Breast mass, left     Chiari I malformation     REPAIRED    Cholelithiasis 2020    lap tanvi 2021    DDD (degenerative disc disease), cervical     FULL ROM    Depression 2000    situational due to death of family member    Disease of thyroid gland     Dizziness     Endometriosis     Fibromyalgia, primary Nov 2022    Fracture of ankle 2009    right    Fracture, fibula 2009    right lateral malleolus    GERD (gastroesophageal reflux disease)     Headache     Heart palpitations     OCC PVC, BENIGN CARDIAC W/U    Hiatal hernia     Hypothyroidism     Knee swelling 1996    left    Low back pain right lumbar pain    Migraine Aug 2019    Peripheral neuropathy July 2019    Right greater than left arm/hands    PONV (postoperative nausea and vomiting)     Stress fracture 20??    Right foot    Subluxation of patella 1992    left    Tear of meniscus of knee 1992    left    Thoracic disc disorder 2022    t6-7 disc bulge    TMJ dysfunction      Past Surgical History:   Procedure Laterality Date    ABDOMINAL SURGERY  2005, 2008, 2009, 2020, 2021    " csection x2, dx laparoscopy x2, lap gastric sleeve, lap tanvi    ANKLE OPEN REDUCTION INTERNAL FIXATION      right ankle ligament repair    ANKLE SURGERY Right 2014    X2 LIGAMENT RECONSTRUCTION    BARIATRIC SURGERY  2020    Gastic sleeve    BRAIN SURGERY  Oct 2016    BREAST BIOPSY Left 2025    Procedure: EXCISION BREAST MASS:  Excise left breast mass;  Surgeon: Gerardo Oreilly MD;  Location: [facility] OR OSC;  Service: General;  Laterality: Left;    CERVICAL CHIARI DECOMPRESSION POSTERIOR N/A 10/25/2016    Procedure: CERVICAL CHIARI DECOMPRESSION POSTERIOR, posterior fossa decompression, C1, C2 laminectomy and expansive duraplasty.;  Surgeon: Jim Santos MD;  Location: [facility] MAIN OR;  Service:      SECTION  ,    CHOLECYSTECTOMY  2021    COLONOSCOPY      COLONOSCOPY N/A 2023    Procedure: COLONOSCOPY WITH RANDOM COLON BIOPSIES;  Surgeon: Major Mary MD;  Location: [facility] ENDOSCOPY;  Service: Gastroenterology;  Laterality: N/A;  HEMORRHOIDS    COSMETIC SURGERY  2024    Panniculectomy    CRANIOTOMY  posterior fossa decompression with duraplasy    Oct 2016/CHIARI REPAIR    DIAGNOSTIC LAPAROSCOPY  2008    X2    ENDOSCOPY  2018    ENDOSCOPY N/A 2020    Procedure: ESOPHAGOGASTRODUODENOSCOPY WITH BIOPSY;  Surgeon: Marc Ramirez Jr., MD;  Location: [facility] ENDOSCOPY;  Service: General;  Laterality: N/A;  PRE-GERD, H/O GASTRIC POLYPS  POST- GASTRIC POLYPS, HIATAL HERNIA, GASTRITIS    EYE SURGERY Right 2022    PRK    GASTRIC SLEEVE LAPAROSCOPIC N/A 2020    Procedure: GASTRIC SLEEVE LAPAROSCOPIC With PARAESOPHAGEAL Hernia Repair;  Surgeon: Marc Ramirez Jr., MD;  Location: [facility] OR [facility];  Service: Bariatric;  Laterality: N/A;    KNEE SURGERY Left     X6, before age 19    LAMINECTOMY  Oct 2016    C1 laminectomy CHIARI REPAIR    PANNICULECTOMY N/A 2024    Procedure: PANNICULECTOMY;  Surgeon: Liliana Gallego MD;  Location: [facility]  OR [facility];  Service: Plastics;  Laterality: N/A;    UPPER GASTROINTESTINAL ENDOSCOPY       Family History   Problem Relation Age of Onset    Hypertension Mother     Obesity Mother     Colon polyps Mother     Hyperlipidemia Mother     Cardiomyopathy Father     Obesity Father     Hypertension Father     Heart disease Father     Sleep apnea Father     Early death Father         idiopathic cardiomyopathy    Hyperlipidemia Father     Obesity Brother     Sleep apnea Brother     Drug abuse Brother     Mental illness Brother         Bipolar    Mental retardation Maternal Aunt     Parkinsonism Paternal Uncle     Obesity Maternal Grandmother     Stroke Maternal Grandmother     Arthritis Maternal Grandmother     Mental illness Maternal Grandmother         Bipolar    Depression Maternal Grandmother         bipolar depression    Vision loss Maternal Grandmother         Glaucoma    Stroke Maternal Grandfather     Thyroid disease Maternal Grandfather     Mental illness Maternal Grandfather     Hypertension Paternal Grandmother     Heart attack Paternal Grandmother     Heart disease Paternal Grandmother         CABG    Parkinsonism Paternal Grandmother     Vision loss Paternal Grandmother     Hypertension Paternal Grandfather     Heart attack Paternal Grandfather     Heart disease Paternal Grandfather     Anxiety disorder Brother     Drug abuse Brother     Mental illness Brother         Bipolar    Seizures Brother         Temporal lobe resection 2016    Depression Brother     Asthma Son     Depression Brother     Developmental Disability Maternal Aunt         mental retardation    Mental retardation Maternal Aunt     Parkinsonism Paternal Uncle     Malig Hyperthermia Neg Hx     Colon cancer Neg Hx      Social History     Socioeconomic History    Marital status:     Number of children: 2    Years of education: College   Tobacco Use    Smoking status: Former     Current packs/day: 0.00     Average packs/day: 1 pack/day for 5.0  years (5.0 ttl pk-yrs)     Types: Cigarettes     Start date: 2002     Quit date: 2007     Years since quittin.9     Passive exposure: Past    Smokeless tobacco: Never    Tobacco comments:     QUIT 2007   Vaping Use    Vaping status: Never Used   Substance and Sexual Activity    Alcohol use: Yes     Alcohol/week: 3.0 standard drinks of alcohol     Types: 3 Cans of beer per week     Comment: social drinker    Drug use: Never    Sexual activity: Yes     Partners: Male     Birth control/protection: OCP       Current Outpatient Medications   Medication Instructions    DULoxetine (CYMBALTA) 30 mg, Oral, Daily    levothyroxine (SYNTHROID, LEVOTHROID) 75 MCG tablet Take 1 tablet by mouth Daily as directed.    Magnesium 200 MG tablet Take  by mouth.    melatonin 10 mg, Nightly    neomycin-polymyxin-hydrocortisone (CORTISPORIN) 1 % solution otic solution 4 drops, Left Ear, 3 Times Daily, Instill drops into affected ear and keep ear upright for 5 minutes.    Norethin-Eth Estrad-Fe Biphas (Lo Loestrin Fe) 1 MG-10 MCG / 10 MCG tablet take 1 tablet by mouth once daily    Semaglutide-Weight Management (Wegovy) 2.4 MG/0.75ML solution auto-injector Inject 2.4 mg under the skin into the appropriate area as directed 1 (One) Time Per Week.       Medications Discontinued During This Encounter   Medication Reason    HYDROcodone-acetaminophen (NORCO) 5-325 MG per tablet *Therapy completed        Physical Exam  Constitutional:       Appearance: Normal appearance.   HENT:      Head: Normocephalic.      Ears:      Comments: Coarse reddish-brown hair noted up against the tympanic membrane     Nose: Nose normal.      Mouth/Throat:      Mouth: Mucous membranes are moist.   Eyes:      Conjunctiva/sclera: Conjunctivae normal.      Pupils: Pupils are equal, round, and reactive to light.   Cardiovascular:      Rate and Rhythm: Normal rate and regular rhythm.   Pulmonary:      Effort: Pulmonary effort is normal.      Breath sounds:  Normal breath sounds.   Abdominal:      General: Bowel sounds are normal.      Palpations: Abdomen is soft.   Musculoskeletal:         General: Normal range of motion.      Cervical back: Normal range of motion.   Skin:     General: Skin is warm and dry.   Neurological:      General: No focal deficit present.      Mental Status: She is alert and oriented to person, place, and time.   Psychiatric:         Mood and Affect: Mood normal.         Behavior: Behavior normal.         Thought Content: Thought content normal.          Physical Exam  Ears: Small hair present in the left ear. Tympanic membrane appears normal. No wax observed.       Result Review :  The following data was reviewed by: VINCE Matthews on 05/09/2025:    Labs  Admission on 03/13/2025, Discharged on 03/13/2025   Component Date Value Ref Range Status    HCG, Urine QL 03/13/2025 Negative  Negative Final    Case Report 03/13/2025    Final                    Value:Surgical Pathology Report                         Case: [id number]                                  Authorizing Provider:  Gerardo Oreilly MD        Collected:           03/13/2025 02:02 PM          Ordering Location:     [facility]  Received:            03/14/2025 07:42 AM                                 OR                                                                           Pathologist:           Guanaco Sheridan MD                                                            Specimen:    Breast, Left, left breast mass                                                             Clinical Information 03/13/2025    Final                    Value:Subareolar mass of left breast      Final Diagnosis 03/13/2025    Final                    Value:Left breast mass, excision:   - Features compatible with lymphocytic/diabetic mastopathy in the appropriate clinical context      Gross Description 03/13/2025    Final                    Value:1. Breast, Left.  Received in  "formalin and labeled \"left breast mass\" is an 11 g, 3.7 x 3.0 x 1.7 cm unoriented, indurated fragment of left breast parenchyma.  No skin is present.  The margin is inked blue, and sectioning reveals a dense, fibrofatty cut surface.  Representative sections are submitted in 5 cassettes.  Cold ischemic time-11 minutes; total formalin fixation time-71 hours and 47 minutes.   RICH      Microscopic Description 03/13/2025    Final                    Value:Microscopic examination performed.         Imaging  Mammo Diagnostic Digital Tomosynthesis Left With CAD  Result Date: 2/19/2025   IMPRESSION: Diagnostic left mammogram and targeted left breast ultrasound demonstrating dense fibroglandular tissue in the area of palpable concern.  The patient reports that the area of concern has been present for 3 weeks. The area is palpable. I would recommend surgical consultation.    TISSUE DENSITY: The breasts are heterogeneously dense, which may obscure small masses. The patient will receive a letter regarding breast density.  BI-RADS ASSESSMENT: BI-RADS 2. Benign findings.   Clinical evaluation.   Note: It has been reported that there is approximately a 15% false negative in mammography. Therefore, management of a palpable abnormality should not be deferred because of a negative mammogram.    2/19/2025 12:43 PM by SANTOS BROWN MD on Workstation: [id number]      US Breast Left Limited  Result Date: 2/19/2025   IMPRESSION: Diagnostic left mammogram and targeted left breast ultrasound demonstrating dense fibroglandular tissue in the area of palpable concern.  The patient reports that the area of concern has been present for 3 weeks. The area is palpable. I would recommend surgical consultation.    TISSUE DENSITY: The breasts are heterogeneously dense, which may obscure small masses. The patient will receive a letter regarding breast density.  BI-RADS ASSESSMENT: BI-RADS 2. Benign findings.   Clinical evaluation.   Note: It has been " reported that there is approximately a 15% false negative in mammography. Therefore, management of a palpable abnormality should not be deferred because of a negative mammogram.    2/19/2025 12:43 PM by SANTOS BROWN MD on Workstation: [email or web address]        Results         Procedures       ASSESSMENT & PLAN  Diagnoses and all orders for this visit:    1. Foreign body of left ear, initial encounter (Primary)  -     neomycin-polymyxin-hydrocortisone (CORTISPORIN) 1 % solution otic solution; Administer 4 drops into the left ear 3 (Three) Times a Day. Instill drops into affected ear and keep ear upright for 5 minutes.  Dispense: 10 mL; Refill: 0  -     Ambulatory Referral to ENT (Otolaryngology)    2. Subareolar mass of left breast    3. Vitamin D deficiency    4. Multiple joint pain    5. Chiari malformation type I         Assessment & Plan  1. Left ear pain.  - The patient reports pressure, popping, and clicking in the left ear, with sharp pain when blowing her nose.  - Examination revealed a hair in the left ear canal.  Patient does not tolerate ear flushing makes her vertigo acting up refuses at this time, unable to reach the ear with the alligator clip the eardrum appears normal with no wax buildup.  - Discussed letting water run into the ear during showers to help dislodge the hair.  - Referral to an ENT specialist will be made for further evaluation and potential removal of the hair.    2. Lymphocytic mastopathy.  - The patient has a history of lymphocytic mastopathy, which is associated with autoimmune conditions.  - The incision site from a previous procedure is slightly stiff, possibly due to scar tissue, but otherwise fine.  - Advised to continue with yearly mammograms and to report any changes promptly.  - Reviewed the potential link to GLP-1 usage and the importance of monitoring for any new symptoms.    3. Weight management.  - The patient has gained 10 pounds but remains at a healthy weight.  - Weight gain  attributed to steroid use for an elbow issue in 12/2024 and increased beer consumption.  - Reassured that her weight is still within a good range.  - Discussed the importance of maintaining a balanced diet and monitoring weight changes.    4. Thyroid and autoimmune conditions.  - Blood pressure is stable, and thyroid function is up to date.  - Discussed the potential impact of GLP-1 on autoimmune conditions, including diabetes.  - Emphasized the importance of regular monitoring and follow-up for thyroid and autoimmune-related symptoms.  - Encouraged to stay vigilant for any new or worsening symptoms and to report them promptly.                  FOLLOW UP  No follow-ups on file.  Patient was given instructions and counseling regarding her condition or for health maintenance advice. Please see specific information pulled into the AVS if appropriate.     Patient or patient representative verbalized consent for the use of Ambient Listening during the visit with  VINCE Matthews for chart documentation. 5/13/2025  08:47 EDT    VINCE Matthews  05/13/25  08:49 EDT            Answers submitted by the patient for this visit:  Ear Pain Questionnaire (Submitted on 5/8/2025)  Chief Complaint: Ear pain  Affected ear: left  Chronicity: recurrent  Onset: 1 to 4 weeks ago  Progression since onset: worsening  Frequency: daily  Fever: no fever  Pain - numeric: 5/10  dizziness: No  cough: No  drainage: No  headaches: No  hearing loss: No  hoarse voice: No  neck pain: No  rash: No  rhinorrhea: No  sore throat: No  congestion: No  jaw pain: No  adenopathy: No  tinnitus: No  Treatments tried : acetaminophen  Improvement on treatment: no relief  Additional Information: Pressure, popping sounds

## 2025-05-20 NOTE — PROGRESS NOTES
Patient Name: Valentina Anderson   Visit Date: 05/21/2025   Patient ID: [id number]  Provider: VINCE Rodríguez    Sex: female  Location: [facility]   YOB: 1980  Location Address: [address] [place] [address], [address] [place] [address],?[place]?[id number]    Primary Care Provider Anna Carias APRN  Location Phone: [phone]    Referring Provider: No ref. provider found        Chief Complaint  Foreign body of left ear (Possible hair in ear ), ear/jaw pain, and Follow-up    History of Present Illness  Valentina Anderson is a 45 y.o. female who presents to [facility] for Foreign body of left ear (Possible hair in ear ), ear/jaw pain, and Follow-up  Patient previously seen by Dr. Dunn with last office visit being in 2022 at which time she had hair removed from her left ear canal.  Patient referred to ENT on 5/9/2025 by VINCE Rea for left otalgia with foreign body of left ear.  History of Present Illness  The patient presents for evaluation of ear pain.    She has a history of foreign body in the ear, specifically a hair, which was previously addressed by Dr. Dunn. Recently, she sought care from her primary care physician due to the same issue. However, the physician was unable to extract the hair and she could not tolerate ear flushing as it exacerbated her vertigo. She believes the issue has been resolved or the hair has shifted away from the membranes, as she no longer perceives it. She has a dog that uses her pillow as a resting place when she is not around. She has been advised against using Q-tips and has adhered to this advice since the onset of her symptoms.    She has been experiencing pain in her inner ear, similar to a previous episode on the contralateral side, which was associated with severe temporomandibular joint (TMJ) disorder. She recently underwent dental procedures on the affected side and is uncertain if the current pain is  "referred from the dental work or a manifestation of TMJ. She has not been utilizing her bite bar due to a recent tooth extraction, which has rendered the device ill-fitting. Her plan is to use her bite guard and if it resolves the pain, she will inform the dentist so they can proceed with placing the crown. She is not experiencing any sensitivity but has been managing the pain with ibuprofen twice daily. She also reports being under stress, which she believes may be contributing to her symptoms.        Vital Signs:  Vitals:    05/21/25 0940   BP: 127/84   Pulse: 73   Temp: 97.8 °F (36.6 °C)   TempSrc: Temporal   Weight: 75.7 kg (166 lb 12.8 oz)   Height: 170.2 cm (67.01\")        Past Medical History:   Diagnosis Date    Allergic rhinitis     Ankle sprain 2009    right    Arthritis     left knee and right ankle    Breast mass, left     Chiari I malformation     REPAIRED    Cholelithiasis 2020    lap tanvi 2021    DDD (degenerative disc disease), cervical     FULL ROM    Depression 2000    situational due to death of family member    Disease of thyroid gland     Dizziness     Endometriosis     Fibromyalgia, primary Nov 2022    Fracture of ankle 2009    right    Fracture, fibula 2009    right lateral malleolus    GERD (gastroesophageal reflux disease)     Headache     Heart palpitations     OCC PVC, BENIGN CARDIAC W/U    Hiatal hernia     Hypothyroidism     Knee swelling 1996    left    Low back pain right lumbar pain    Migraine Aug 2019    Peripheral neuropathy July 2019    Right greater than left arm/hands    PONV (postoperative nausea and vomiting)     Stress fracture 20??    Right foot    Subluxation of patella 1992    left    Tear of meniscus of knee 1992    left    Thoracic disc disorder 2022    t6-7 disc bulge    TMJ dysfunction        Past Surgical History:   Procedure Laterality Date    ABDOMINAL SURGERY  2005, 2008, 2009, 2020, 2021    csection x2, dx laparoscopy x2, lap gastric sleeve, lap tanvi    ANKLE " OPEN REDUCTION INTERNAL FIXATION      right ankle ligament repair    ANKLE SURGERY Right 2014    X2 LIGAMENT RECONSTRUCTION    BARIATRIC SURGERY  2020    Gastic sleeve    BRAIN SURGERY  Oct 2016    BREAST BIOPSY Left 2025    Procedure: EXCISION BREAST MASS:  Excise left breast mass;  Surgeon: Gerardo Oreilly MD;  Location: [facility] OR [facility];  Service: General;  Laterality: Left;    CERVICAL CHIARI DECOMPRESSION POSTERIOR N/A 10/25/2016    Procedure: CERVICAL CHIARI DECOMPRESSION POSTERIOR, posterior fossa decompression, C1, C2 laminectomy and expansive duraplasty.;  Surgeon: Jim Santos MD;  Location: [facility] MAIN OR;  Service:      SECTION  ,    CHOLECYSTECTOMY  2021    COLONOSCOPY  2013    COLONOSCOPY N/A 2023    Procedure: COLONOSCOPY WITH RANDOM COLON BIOPSIES;  Surgeon: Major Mary MD;  Location: [facility] ENDOSCOPY;  Service: Gastroenterology;  Laterality: N/A;  HEMORRHOIDS    COSMETIC SURGERY  2024    Panniculectomy    CRANIOTOMY  posterior fossa decompression with duraplasy    Oct 2016/CHIARI REPAIR    DIAGNOSTIC LAPAROSCOPY  2008    X2    ENDOSCOPY  2018    ENDOSCOPY N/A 2020    Procedure: ESOPHAGOGASTRODUODENOSCOPY WITH BIOPSY;  Surgeon: Marc Ramirez Jr., MD;  Location: [facility] ENDOSCOPY;  Service: General;  Laterality: N/A;  PRE-GERD, H/O GASTRIC POLYPS  POST- GASTRIC POLYPS, HIATAL HERNIA, GASTRITIS    EYE SURGERY Right 2022    PRK    GASTRIC SLEEVE LAPAROSCOPIC N/A 2020    Procedure: GASTRIC SLEEVE LAPAROSCOPIC With PARAESOPHAGEAL Hernia Repair;  Surgeon: Marc Ramirez Jr., MD;  Location: [facility] OR [facility];  Service: Bariatric;  Laterality: N/A;    KNEE SURGERY Left     X6, before age 19    LAMINECTOMY  Oct 2016    C1 laminectomy CHIARI REPAIR    PANNICULECTOMY N/A 2024    Procedure: PANNICULECTOMY;  Surgeon: Liliana Gallego MD;  Location: [facility] OR [facility];  Service: Plastics;  Laterality: N/A;    UPPER  GASTROINTESTINAL ENDOSCOPY           Current Outpatient Medications:     DULoxetine (CYMBALTA) 30 MG capsule, Take 1 capsule by mouth Daily., Disp: 60 capsule, Rfl: 0    L-Theanine 200 MG capsule, Take  by mouth., Disp: , Rfl:     LAVENDER OIL PO, Take  by mouth., Disp: , Rfl:     levothyroxine (SYNTHROID, LEVOTHROID) 75 MCG tablet, Take 1 tablet by mouth Daily as directed., Disp: 60 tablet, Rfl: 0    Magnesium 200 MG tablet, Take  by mouth., Disp: , Rfl:     melatonin 5 MG tablet tablet, Take 2 tablets by mouth Every Night., Disp: , Rfl:     neomycin-polymyxin-hydrocortisone (CORTISPORIN) 1 % solution otic solution, Administer 4 drops into the left ear 3 (Three) Times a Day. Instill drops into affected ear and keep ear upright for 5 minutes., Disp: 10 mL, Rfl: 0    Norethin-Eth Estrad-Fe Biphas (Lo Loestrin Fe) 1 MG-10 MCG / 10 MCG tablet, take 1 tablet by mouth once daily, Disp: 84 tablet, Rfl: 3    Semaglutide-Weight Management (Wegovy) 2.4 MG/0.75ML solution auto-injector, Inject 2.4 mg under the skin into the appropriate area as directed 1 (One) Time Per Week., Disp: 9 mL, Rfl: 3    cyclobenzaprine (FLEXERIL) 10 MG tablet, Take 1 tablet by mouth Every Night for 90 days., Disp: 30 tablet, Rfl: 2    diclofenac (VOLTAREN) 50 MG EC tablet, Take 1 tablet by mouth 2 (Two) Times a Day As Needed (TMJ pain) for up to 14 days., Disp: 28 tablet, Rfl: 1     Allergies   Allergen Reactions    Penicillins Hives and Rash    Sulfa Antibiotics Hives and Rash       Social History     Tobacco Use    Smoking status: Former     Current packs/day: 0.00     Average packs/day: 1 pack/day for 5.0 years (5.0 ttl pk-yrs)     Types: Cigarettes     Start date: 2002     Quit date: 2007     Years since quittin.9     Passive exposure: Past    Smokeless tobacco: Never    Tobacco comments:     QUIT 2007   Vaping Use    Vaping status: Never Used   Substance Use Topics    Alcohol use: Yes     Alcohol/week: 3.0 standard drinks of  alcohol     Types: 3 Cans of beer per week     Comment: social drinker    Drug use: Never        Objective     Tobacco Use: Medium Risk (5/21/2025)    Patient History     Smoking Tobacco Use: Former     Smokeless Tobacco Use: Never     Passive Exposure: Past         Physical Exam    Constitutional   Appearance  well developed, well-nourished, alert and in no acute distress, voice clear and strong    Head   Inspection  no deformities or lesions, atraumatic    Face   Inspection  no facial lesions; House-Brackmann I/VI bilaterally   Palpation  no TMJ crepitus nor  muscle tenderness bilaterally     Eyes/Vision   Visual Fields  extraocular movements are intact, no spontaneous or gaze-induced nystagmus  Conjunctivae  clear   Sclerae  clear   Pupils and Irises  pupils equal, round, and reactive to light.   Nystagmus  not present     Ears, Nose, Mouth and Throat  Ears  External Ears  Auricles appearance within normal limits, no lesions present   Otoscopic Examination  Dog hair and bilateral EACs  tympanic membrane appearance within normal limits bilaterally without perforations, well-aerated middle ears without evidence of effusion  Hearing  intact to conversational voice both ears   Tunning fork testing    Rinne:  Diop:    Nose  External Nose  appearance normal   Intranasal Exam  mucosa within normal limits, vestibules normal, no intranasal lesions present, septum midline, sinuses non tender to percussion   Modified Laurel Test:    Oral Cavity  Oral Mucosa  oral mucosa normal without pallor or cyanosis   Stensen's and Warthin's ducts are productive and patent with clear saliva  Lips  lip appearance normal   Teeth  normal dentition for age, missing tooth number 3  Gums  gums pink, non-swollen, no bleeding present   Tongue  tongue appearance normal; normal mobility   Palate  hard palate normal, soft palate appearance normal with symmetric mobility     Throat  Oropharynx  no inflammation or lesions  present  Tonsils  Bilateral tonsils unremarkable  Hypopharynx  appearance within normal limits   Larynx  voice normal     Neck  Inspection/Palpation  normal appearance, no masses or tenderness, trachea midline; thyroid size normal, nontender, no nodules or masses present on palpation     Lymphatic  Neck  no lymphadenopathy present   Supraclavicular Nodes  no lymphadenopathy present   Preauricular Nodes  no lymphadenopathy present     Respiratory  Respiratory Effort  breathing unlabored   Inspection of Chest  normal appearance, no retractions     Musculoskeletal   Cervical back: Normal range of motion and neck supple.      Skin and Subcutaneous Tissue  General Inspection  Regarding face and neck - there are no rashes present, no lesions present, and no areas of discoloration     Neurologic  Cranial Nerves  Alert and oriented x3  cranial nerves II-XII are grossly intact bilaterally   Gait and Station  normal gait, able to stand without diffculty    Psychiatric  Judgement and Insight  judgment and insight intact   Mood and Affect  mood normal, affect appropriate       RESULTS REVIEWED    I have reviewed the following information:   []  Previous Internal Note  [x]  Previous External Note:   [x]  Ordered Tests & Results:      Pathology:   Lab Results   Component Value Date    Microscopic Description  03/13/2025     Microscopic examination performed.         TSH   Date Value Ref Range Status   10/23/2024 2.010 0.270 - 4.200 uIU/mL Final     Free T4   Date Value Ref Range Status   06/15/2023 1.57 0.93 - 1.70 ng/dL Final     PTH, Intact   Date Value Ref Range Status   03/03/2023 38.1 15.0 - 65.0 pg/mL Final     Calcium   Date Value Ref Range Status   10/23/2024 9.1 8.6 - 10.5 mg/dL Final   11/14/2022 9.3 8.4 - 10.2 mg/dL Final     25 Hydroxy, Vitamin D   Date Value Ref Range Status   10/23/2024 37.1 30.0 - 100.0 ng/ml Final     Thyroid Peroxidase Antibody   Date Value Ref Range Status   07/20/2023 <9 0 - 34 IU/mL Final        Mammo Diagnostic Digital Tomosynthesis Left With CAD  Result Date: 2/19/2025   IMPRESSION: Diagnostic left mammogram and targeted left breast ultrasound demonstrating dense fibroglandular tissue in the area of palpable concern.  The patient reports that the area of concern has been present for 3 weeks. The area is palpable. I would recommend surgical consultation.    TISSUE DENSITY: The breasts are heterogeneously dense, which may obscure small masses. The patient will receive a letter regarding breast density.  BI-RADS ASSESSMENT: BI-RADS 2. Benign findings.   Clinical evaluation.   Note: It has been reported that there is approximately a 15% false negative in mammography. Therefore, management of a palpable abnormality should not be deferred because of a negative mammogram.    2/19/2025 12:43 PM by SANTOS BROWN MD on Workstation: First To File      US Breast Left Limited  Result Date: 2/19/2025   IMPRESSION: Diagnostic left mammogram and targeted left breast ultrasound demonstrating dense fibroglandular tissue in the area of palpable concern.  The patient reports that the area of concern has been present for 3 weeks. The area is palpable. I would recommend surgical consultation.    TISSUE DENSITY: The breasts are heterogeneously dense, which may obscure small masses. The patient will receive a letter regarding breast density.  BI-RADS ASSESSMENT: BI-RADS 2. Benign findings.   Clinical evaluation.   Note: It has been reported that there is approximately a 15% false negative in mammography. Therefore, management of a palpable abnormality should not be deferred because of a negative mammogram.    2/19/2025 12:43 PM by SANTOS BROWN MD on Workstation: First To File      I have discussed the interpretation of the above results with the patient.    Foreign Body Removal    Date/Time: 5/21/2025 10:02 AM    Performed by: Fay Lezama APRN  Authorized by: Fay Lezama APRN  Body area: ear  Location details: right  ear    Sedation:  Patient sedated: no    Patient restrained: no  Patient cooperative: yes  Localization method: magnification  Removal mechanism: alligator forceps and curved hook  Complexity: simple  1 objects recovered.  Objects recovered: Dog hair  Post-procedure assessment: foreign body removed  Patient tolerance: patient tolerated the procedure well with no immediate complications    Foreign Body Removal    Date/Time: 5/21/2025 10:03 AM    Performed by: Fay Lezama APRN  Authorized by: Fay Lezama APRN  Body area: ear  Location details: left ear    Sedation:  Patient sedated: no    Patient restrained: no  Patient cooperative: yes  Localization method: magnification  Removal mechanism: alligator forceps and curved hook  Complexity: simple  1 objects recovered.  Objects recovered: Dog hair  Post-procedure assessment: foreign body removed  Patient tolerance: patient tolerated the procedure well with no immediate complications              Assessment and Plan   Diagnoses and all orders for this visit:    1. Otalgia, left (Primary)    2. Foreign body of left ear, subsequent encounter  -     Foreign Body Removal    3. Foreign body in auricle of right ear, initial encounter  -     Foreign Body Removal    4. TMJ inflammation  -     cyclobenzaprine (FLEXERIL) 10 MG tablet; Take 1 tablet by mouth Every Night for 90 days.  Dispense: 30 tablet; Refill: 2  -     diclofenac (VOLTAREN) 50 MG EC tablet; Take 1 tablet by mouth 2 (Two) Times a Day As Needed (TMJ pain) for up to 14 days.  Dispense: 28 tablet; Refill: 1        Assessment & Plan  1. Ear pain.  The patient reports developing pain in the inner ear, which may be related to TMJ or referred pain from recent dental work. The presence of a hair in the ear was noted and removed during the visit. She is advised to use her bite guard to see if it alleviates the pain. A prescription for Flexeril and diclofenac will be sent to the pharmacy to manage potential  flare-ups. If the pain persists, she should contact the office for further evaluation.    2. Temporomandibular joint disorder (TMJ).  The patient has a history of TMJ and reports not using her bite guard due to a recent tooth extraction. She plans to purchase a new bite guard. She is advised to use her bite guard to see if it alleviates the pain. A prescription for Flexeril and diclofenac will be sent to the pharmacy to manage potential flare-ups. If the pain persists, she should contact the office for further evaluation.    3.  Foreign body removal.  Dogear removed under microscopy of bilateral EACs.  Bilateral EACs and TMs unremarkable on exam.  Patient tolerated procedure well.    Risks, benefits, and alternatives of using Flexeril were discussed, including the potential for drowsiness and the importance of not operating heavy machinery while taking the medication. The patient was informed about the potential for dependency and the need to use the medication only as prescribed. Alternatives such as physical therapy and other non-pharmacological interventions were also discussed.     Follow-up  Follow up as needed.    PROCEDURE  Procedure: Cerumen removal (right ear)    All questions were answered and agreement to proceed was given after the following Pre-Procedure details were reviewed:  - Risks and Benefits: Discussed the potential for discomfort and the benefit of removing the hair to prevent further irritation.  - Side effects: Discussed the possibility of minor discomfort during the removal process.  - Consent: Verbal consent was obtained.        (H92.02) Otalgia, left    (T16.2XXD) Foreign body of left ear, subsequent encounter - Plan: Foreign Body Removal    (T16.1XXA) Foreign body in auricle of right ear, initial encounter - Plan: Foreign Body Removal    (M26.69) TMJ inflammation - Plan: cyclobenzaprine (FLEXERIL) 10 MG tablet, diclofenac (VOLTAREN) 50 MG EC tablet     Valentina RANDY Anderson  reports that she  quit smoking about 17 years ago. Her smoking use included cigarettes. She started smoking about 22 years ago. She has a 5 pack-year smoking history. She has been exposed to tobacco smoke. She has never used smokeless tobacco.       Plan:  Patient Instructions   TEMPOROMANDIBULAR JOINT EXERCISES  The temporomandibular joint, or the TMJ, is the jaw joint. This joint can frequently be a source of pain in the head and neck region. Typically because of its location, pain is felt either in area just in front of the ear, or in the ear itself. However, pain in the TMJ can be referred to any part of the head and neck.     An examination by the physician frequently reveals tenderness around the joint. Oftentimes, a crack or pop can also be felt. This may be an indication that the pain is in all actuality coming from the joint. An exhaustive search for a cause is carried out, in an effort to determine the etiology of the pain. Pain can be caused by grinding of teeth at night (bruxism), overuse (gum chewing, ice cracking, over opening mouth), poor dentition and malocclusion (bad bite). In an attempt to be thorough, your physician may involve others who have experience in this field, such as oral surgeons, dentists and pain experts.     Should you be diagnosed with TMJ pain, a course of conservative treatment is indicated, as this works in an overwhelming majority of cases. Because this pain originates from a joint, the treatment is similar to treatment for pain in other joints.  Treatment:  -Rest:  This is indicated to relieve the pressure on the joint. No chewing gum, tough meat, hard bread, cracking ice in the teeth, or any other activity that places undue stress on the joint. Simply, if it causes it to hurt, stop doing it. This may be required for an unspecified period of time, usually 1 to 2 weeks.  -Cold to the area:  This will reduce swelling and pain early in the course.  -Heat to the area:  This improves blood flow to  the area and speeds healing.  -Pain Relievers: Anti-inflammatory medications, such as aspirin, Motrin, Advil, Nuprin, Aleve or any other products in this category are satisfactory to use in the face of joint pain. Rarely are narcotics required, except possibly in the acute phase to gain some initial relief.    Recommendations:  ?       Reduce/eliminate caffeinated drinks  ?       Reduce high sugar drinks and foods  ?       Get plenty of rest  ?       Practice relaxation techniques; Yoga, Biofeedback, Felix Chi, etc.  ?       Exercise for overall fitness  ?       Eat healthy- High fiber, low fat/cholesterol eating, and change eating habits. We recommend Sugar Busters as a lifestyle change for eating.  ?       See your dentist regularly for checkups and bite checks.  Rehabilitation:  -Once the acute pain has been controlled, you should begin exercises to strengthen and rehabilitate the TMJ.  -Exercises: These should be performed for five minutes at least five times each day  -Slowly open the mouth to its fullest extent, even using the fingers to exert gentle pressure.  -Open and close the mouth as widely and rapidly as possible.  -Open and close the mouth against resistance by placing the open palm underneath the chin, or the fingers on top of the chin.  -Move the lower jaw side to side without resistance. Later, add resistance by placing both hands on either side of the jaw.  -Push (protrude) the lower jaw without resistance. Later add resistance.     Should the above regimen fail to lead to improvement, your physician will discuss with you other forms of therapy. Since almost all types of TMJ pain respond to conservative therapy, surgery is indicated only after exhausting the rehabilitation.          Follow Up   Return if symptoms worsen or fail to improve.  Patient was given instructions and counseling regarding her condition or for health maintenance advice. Please see specific information pulled into the AVS if  appropriate.      Patient or patient representative verbalized consent for the use of Ambient Listening during the visit with  VINCE Rodríguez for chart documentation. 5/21/2025  10:05 EDT    All or a portion of this Note was dictated utilizing Dragon Dictation.

## 2025-05-21 ENCOUNTER — OFFICE VISIT (OUTPATIENT)
Dept: OTOLARYNGOLOGY | Facility: CLINIC | Age: 45
End: 2025-05-21
Payer: COMMERCIAL

## 2025-05-21 VITALS
BODY MASS INDEX: 26.18 KG/M2 | TEMPERATURE: 97.8 F | HEART RATE: 73 BPM | SYSTOLIC BLOOD PRESSURE: 127 MMHG | WEIGHT: 166.8 LBS | DIASTOLIC BLOOD PRESSURE: 84 MMHG | HEIGHT: 67 IN

## 2025-05-21 DIAGNOSIS — T16.2XXD FOREIGN BODY OF LEFT EAR, SUBSEQUENT ENCOUNTER: ICD-10-CM

## 2025-05-21 DIAGNOSIS — T16.1XXA FOREIGN BODY IN AURICLE OF RIGHT EAR, INITIAL ENCOUNTER: ICD-10-CM

## 2025-05-21 DIAGNOSIS — M26.69 TMJ INFLAMMATION: ICD-10-CM

## 2025-05-21 DIAGNOSIS — H92.02 OTALGIA, LEFT: Primary | ICD-10-CM

## 2025-05-21 RX ORDER — CYCLOBENZAPRINE HCL 10 MG
10 TABLET ORAL NIGHTLY
Qty: 30 TABLET | Refills: 2 | Status: SHIPPED | OUTPATIENT
Start: 2025-05-21 | End: 2025-08-19

## 2025-05-21 RX ORDER — CHOLECALCIFEROL (VITAMIN D3) 125 MCG
CAPSULE ORAL
COMMUNITY

## 2025-05-21 NOTE — PATIENT INSTRUCTIONS
TEMPOROMANDIBULAR JOINT EXERCISES  The temporomandibular joint, or the TMJ, is the jaw joint. This joint can frequently be a source of pain in the head and neck region. Typically because of its location, pain is felt either in area just in front of the ear, or in the ear itself. However, pain in the TMJ can be referred to any part of the head and neck.     An examination by the physician frequently reveals tenderness around the joint. Oftentimes, a crack or pop can also be felt. This may be an indication that the pain is in all actuality coming from the joint. An exhaustive search for a cause is carried out, in an effort to determine the etiology of the pain. Pain can be caused by grinding of teeth at night (bruxism), overuse (gum chewing, ice cracking, over opening mouth), poor dentition and malocclusion (bad bite). In an attempt to be thorough, your physician may involve others who have experience in this field, such as oral surgeons, dentists and pain experts.     Should you be diagnosed with TMJ pain, a course of conservative treatment is indicated, as this works in an overwhelming majority of cases. Because this pain originates from a joint, the treatment is similar to treatment for pain in other joints.  Treatment:  -Rest:  This is indicated to relieve the pressure on the joint. No chewing gum, tough meat, hard bread, cracking ice in the teeth, or any other activity that places undue stress on the joint. Simply, if it causes it to hurt, stop doing it. This may be required for an unspecified period of time, usually 1 to 2 weeks.  -Cold to the area:  This will reduce swelling and pain early in the course.  -Heat to the area:  This improves blood flow to the area and speeds healing.  -Pain Relievers: Anti-inflammatory medications, such as aspirin, Motrin, Advil, Nuprin, Aleve or any other products in this category are satisfactory to use in the face of joint pain. Rarely are narcotics required, except possibly in  the acute phase to gain some initial relief.    Recommendations:  ?       Reduce/eliminate caffeinated drinks  ?       Reduce high sugar drinks and foods  ?       Get plenty of rest  ?       Practice relaxation techniques; Yoga, Biofeedback, Felix Chi, etc.  ?       Exercise for overall fitness  ?       Eat healthy- High fiber, low fat/cholesterol eating, and change eating habits. We recommend Sugar Busters as a lifestyle change for eating.  ?       See your dentist regularly for checkups and bite checks.  Rehabilitation:  -Once the acute pain has been controlled, you should begin exercises to strengthen and rehabilitate the TMJ.  -Exercises: These should be performed for five minutes at least five times each day  -Slowly open the mouth to its fullest extent, even using the fingers to exert gentle pressure.  -Open and close the mouth as widely and rapidly as possible.  -Open and close the mouth against resistance by placing the open palm underneath the chin, or the fingers on top of the chin.  -Move the lower jaw side to side without resistance. Later, add resistance by placing both hands on either side of the jaw.  -Push (protrude) the lower jaw without resistance. Later add resistance.     Should the above regimen fail to lead to improvement, your physician will discuss with you other forms of therapy. Since almost all types of TMJ pain respond to conservative therapy, surgery is indicated only after exhausting the rehabilitation.

## 2025-07-11 RX ORDER — LEVOTHYROXINE SODIUM 75 UG/1
75 TABLET ORAL DAILY
Qty: 60 TABLET | Refills: 0 | Status: SHIPPED | OUTPATIENT
Start: 2025-07-11

## 2025-07-11 RX ORDER — DULOXETIN HYDROCHLORIDE 30 MG/1
30 CAPSULE, DELAYED RELEASE ORAL DAILY
Qty: 60 CAPSULE | Refills: 0 | Status: SHIPPED | OUTPATIENT
Start: 2025-07-11

## 2025-08-11 RX ORDER — LEVOTHYROXINE SODIUM 75 UG/1
75 TABLET ORAL DAILY
Qty: 60 TABLET | Refills: 5 | Status: SHIPPED | OUTPATIENT
Start: 2025-08-11

## 2025-08-11 RX ORDER — DULOXETIN HYDROCHLORIDE 30 MG/1
30 CAPSULE, DELAYED RELEASE ORAL DAILY
Qty: 60 CAPSULE | Refills: 5 | Status: SHIPPED | OUTPATIENT
Start: 2025-08-11

## (undated) DEVICE — INTENDED FOR TISSUE SEPARATION, AND OTHER PROCEDURES THAT REQUIRE A SHARP SURGICAL BLADE TO PUNCTURE OR CUT.: Brand: BARD-PARKER ® CARBON RIB-BACK BLADES

## (undated) DEVICE — ADAPT CLN BIOGUARD AIR/H2O DISP

## (undated) DEVICE — Device: Brand: DEFENDO AIR/WATER/SUCTION AND BIOPSY VALVE

## (undated) DEVICE — PROXIMATE RH ROTATING HEAD SKIN STAPLERS (35 WIDE) CONTAINS 35 STAINLESS STEEL STAPLES: Brand: PROXIMATE

## (undated) DEVICE — MSK PROC CURAPLEX O2 2/ADAPT 7FT

## (undated) DEVICE — SUT SILK 3/0 TIES 18IN A184H

## (undated) DEVICE — LAPAROSCOPIC SMOKE FILTRATION SYSTEM: Brand: PALL LAPAROSHIELD® PLUS LAPAROSCOPIC SMOKE FILTRATION SYSTEM

## (undated) DEVICE — NON-WOVEN ADHESIVE WOUND DRESSING: Brand: PRIMAPORE ADHESIVE DRESSING 10*8CM

## (undated) DEVICE — SUT VIC 3/0 SH 27IN J416H

## (undated) DEVICE — MARKER,SKIN,WI/RULER AND LABELS: Brand: MEDLINE

## (undated) DEVICE — PLASTIC MAJOR-LF: Brand: MEDLINE INDUSTRIES, INC.

## (undated) DEVICE — ELECTRD BLD EDGE/STD 1P 2.4X6.35MM STRL

## (undated) DEVICE — JACKSON-PRATT 100CC BULB RESERVOIR: Brand: CARDINAL HEALTH

## (undated) DEVICE — SOLIDIFIER LIQLOC PLS 1500CC BT

## (undated) DEVICE — VIOLET BRAIDED (POLYGLACTIN 910), SYNTHETIC ABSORBABLE SUTURE: Brand: COATED VICRYL

## (undated) DEVICE — DRSNG TELFA PAD NONADH STR 1S 3X4IN

## (undated) DEVICE — STERILE POLYISOPRENE POWDER-FREE SURGICAL GLOVES: Brand: PROTEXIS

## (undated) DEVICE — SYR LL TP 10ML STRL

## (undated) DEVICE — SLV SCD KN/LEN ADJ EXPRSS BLENDED MD 1P/U

## (undated) DEVICE — GOWN,SIRUS,POLYRNF,BRTHSLV,XL,30/CS: Brand: MEDLINE

## (undated) DEVICE — SENSR O2 OXIMAX FNGR A/ 18IN NONSTR

## (undated) DEVICE — STERILE COTTON BALLS LARGE 5/P: Brand: MEDLINE

## (undated) DEVICE — GOWN,REINFORCE,POLY,SIRUS,BREATH SLV,XLG: Brand: MEDLINE

## (undated) DEVICE — SUT MNCRYL 4/0 PS2 18 IN

## (undated) DEVICE — ANTIBACTERIAL VIOLET BRAIDED (POLYGLACTIN 910), SYNTHETIC ABSORBABLE SUTURE: Brand: COATED VICRYL

## (undated) DEVICE — DRSNG SURESITE WNDW 4X4.5

## (undated) DEVICE — KT ORCA ORCAPOD DISP STRL

## (undated) DEVICE — ECHELON FLEX POWERED PLUS LONG ARTICULATING ENDOSCOPIC LINEAR CUTTER, 60MM: Brand: ECHELON FLEX

## (undated) DEVICE — MEDI-VAC YANKAUER SUCTION HANDLE W/BULBOUS TIP: Brand: CARDINAL HEALTH

## (undated) DEVICE — LN SMPL CO2 SHTRM SD STREAM W/M LUER

## (undated) DEVICE — STERILE POLYISOPRENE POWDER-FREE SURGICAL GLOVES WITH EMOLLIENT COATING: Brand: PROTEXIS

## (undated) DEVICE — MAJOR-LF: Brand: MEDLINE INDUSTRIES, INC.

## (undated) DEVICE — GLOVE,SURG,SENSICARE SLT,LF,PF,5.5: Brand: MEDLINE

## (undated) DEVICE — GLV SURG SIGNATURE ESSENTIAL PF LTX SZ8.5

## (undated) DEVICE — PENCL SMOKE/EVAC MEGADYNE TELESCP 10FT

## (undated) DEVICE — DRSNG PAD ABD 8X10IN STRL

## (undated) DEVICE — PK OSC LAP SLV 40

## (undated) DEVICE — STPLR SKIN SUBCUTICULAR INSORB 2030

## (undated) DEVICE — SEALANT WND FIBRIN TISSEEL PREFIL/SYR/PRIMAFZ 4ML

## (undated) DEVICE — TUBING, SUCTION, 1/4" X 10', STRAIGHT: Brand: MEDLINE

## (undated) DEVICE — 1000ML,PRESSURE INFUSER W/STOPCOCK: Brand: MEDLINE

## (undated) DEVICE — Device

## (undated) DEVICE — APL DUPLOSPRAYER MIS 40CM

## (undated) DEVICE — GLV SURG BIOGEL LTX PF 7 1/2

## (undated) DEVICE — SOL IRRG H2O PL/BG 1000ML STRL

## (undated) DEVICE — ENSEAL LAPAROSCOPIC TISSUE SEALER G2 ARTICULATING CURVED JAW FOR USE WITH G2 GENERATOR 5MM DIAMETER 45CM SHAFT LENGTH: Brand: ENSEAL

## (undated) DEVICE — CLMP STD 22CM DISP

## (undated) DEVICE — CONN TBG Y 5 IN 1 LF STRL

## (undated) DEVICE — ENDOPATH XCEL BLADELESS TROCARS WITH STABILITY SLEEVES: Brand: ENDOPATH XCEL

## (undated) DEVICE — BITEBLOCK OMNI BLOC

## (undated) DEVICE — ST TISSOMAT SPRY

## (undated) DEVICE — MARKR SKIN W/RULR AND LBL

## (undated) DEVICE — SINGLE-USE BIOPSY FORCEPS: Brand: RADIAL JAW 4

## (undated) DEVICE — SUT ETHLN 3-0 FS118IN 663H

## (undated) DEVICE — THE STERILE LIGHT HANDLE COVER IS USED WITH STERIS SURGICAL LIGHTING AND VISUALIZATION SYSTEMS.

## (undated) DEVICE — GLV SURG BIOGEL LTX PF 8 1/2

## (undated) DEVICE — SUT PDS 0 CT-1 Z340H

## (undated) DEVICE — VISUALIZATION SYSTEM: Brand: CLEARIFY

## (undated) DEVICE — FRCP BX RADJAW4 NDL 2.8 240CM LG OG BX40

## (undated) DEVICE — DRSNG GZ CURAD XEROFORM PETROLTM OVERWRP 1X8IN STRL

## (undated) DEVICE — ABDOMINAL BINDER: Brand: DEROYAL

## (undated) DEVICE — SOL IRR NACL 0.9PCT BO 1000ML

## (undated) DEVICE — KTTNER ENDO BLNT DISSCT